# Patient Record
Sex: FEMALE | Race: WHITE | Employment: OTHER | URBAN - METROPOLITAN AREA
[De-identification: names, ages, dates, MRNs, and addresses within clinical notes are randomized per-mention and may not be internally consistent; named-entity substitution may affect disease eponyms.]

---

## 2017-02-16 ENCOUNTER — ANESTHESIA EVENT (OUTPATIENT)
Dept: SURGERY | Age: 77
End: 2017-02-16
Payer: MEDICARE

## 2017-02-17 ENCOUNTER — ANESTHESIA (OUTPATIENT)
Dept: SURGERY | Age: 77
End: 2017-02-17
Payer: MEDICARE

## 2017-02-17 ENCOUNTER — SURGERY (OUTPATIENT)
Age: 77
End: 2017-02-17

## 2017-02-17 PROCEDURE — 74011000250 HC RX REV CODE- 250

## 2017-02-17 PROCEDURE — 74011250636 HC RX REV CODE- 250/636

## 2017-02-17 RX ORDER — LIDOCAINE HYDROCHLORIDE 20 MG/ML
INJECTION, SOLUTION EPIDURAL; INFILTRATION; INTRACAUDAL; PERINEURAL AS NEEDED
Status: DISCONTINUED | OUTPATIENT
Start: 2017-02-17 | End: 2017-02-17 | Stop reason: HOSPADM

## 2017-02-17 RX ORDER — PROPOFOL 10 MG/ML
INJECTION, EMULSION INTRAVENOUS AS NEEDED
Status: DISCONTINUED | OUTPATIENT
Start: 2017-02-17 | End: 2017-02-17 | Stop reason: HOSPADM

## 2017-02-17 RX ADMIN — PROPOFOL 20 MG: 10 INJECTION, EMULSION INTRAVENOUS at 08:27

## 2017-02-17 RX ADMIN — PROPOFOL 20 MG: 10 INJECTION, EMULSION INTRAVENOUS at 08:29

## 2017-02-17 RX ADMIN — LIDOCAINE HYDROCHLORIDE 60 MG: 20 INJECTION, SOLUTION EPIDURAL; INFILTRATION; INTRACAUDAL; PERINEURAL at 08:24

## 2017-02-17 RX ADMIN — PROPOFOL 30 MG: 10 INJECTION, EMULSION INTRAVENOUS at 08:32

## 2017-02-17 RX ADMIN — PROPOFOL 70 MG: 10 INJECTION, EMULSION INTRAVENOUS at 08:24

## 2017-02-17 NOTE — ANESTHESIA PREPROCEDURE EVALUATION
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)     Other Findings   Comments:   IBS (irritable bowel syndrome           Physical Exam    Airway  Mallampati: II  TM Distance: 4 - 6 cm  Neck ROM: normal range of motion   Mouth opening: Normal     Cardiovascular  Regular rate and rhythm,  S1 and S2 normal,  no murmur, click, rub, or gallop  Rhythm: regular  Rate: normal         Dental  No notable dental hx       Pulmonary  Breath sounds clear to auscultation               Abdominal  GI exam deferred       Other Findings            Anesthetic Plan    ASA: 2  Anesthesia type: total IV anesthesia    Monitoring Plan: BIS      Induction: Intravenous  Anesthetic plan and risks discussed with: Patient

## 2017-02-17 NOTE — ANESTHESIA POSTPROCEDURE EVALUATION
Post-Anesthesia Evaluation and Assessment    Patient: Lacie Coley MRN: [id number]  SSN: [id number]    YOB: 1940  Age: 68 y.o. Sex: female       Cardiovascular Function/Vital Signs  Visit Vitals    /57    Pulse 79    Temp 36.5 °C (97.7 °F)    Resp 16    Ht 5' 7\" (1.702 m)    Wt 82.2 kg (181 lb 4 oz)    SpO2 100%    BMI 28.39 kg/m2       Patient is status post total IV anesthesia, MAC anesthesia for Procedure(s):  EGD  ESOPHAGOGASTRODUODENAL (EGD) BIOPSY  ESOPHAGEAL DILATION. Nausea/Vomiting: None    Postoperative hydration reviewed and adequate. Pain:  Pain Scale 1: Numeric (0 - 10) (02/17/17 0849)  Pain Intensity 1: 0 (02/17/17 0849)   Managed    Neurological Status:   Neuro (WDL): Exceptions to [facility] (02/17/17 9446)  Neuro  Neurologic State: Drowsy; Eyes open spontaneously (02/17/17 0849)  LUE Motor Response: Purposeful (02/17/17 0849)  LLE Motor Response: Purposeful (02/17/17 0849)  RUE Motor Response: Purposeful (02/17/17 0849)  RLE Motor Response: Purposeful (02/17/17 0849)   At baseline    Mental Status and Level of Consciousness: Arousable    Pulmonary Status:   O2 Device: Nasal cannula (02/17/17 0844)   Adequate oxygenation and airway patent    Complications related to anesthesia: None    Post-anesthesia assessment completed.  No concerns    Signed By: Arlet Perry MD     February 17, 2017

## 2017-02-17 NOTE — ANESTHESIA POSTPROCEDURE EVALUATION
Post-Anesthesia Evaluation and Assessment    Patient: Andre Sherman MRN: [id number]  SSN: [id number]    YOB: 1940  Age: 68 y.o. Sex: female       Cardiovascular Function/Vital Signs  Visit Vitals    /57    Pulse 71    Temp 36.5 °C (97.7 °F)    Resp 12    Ht 5' 7\" (1.702 m)    Wt 82.2 kg (181 lb 4 oz)    SpO2 100%    BMI 28.39 kg/m2       Patient is status post total IV anesthesia, MAC anesthesia for Procedure(s):  EGD  ESOPHAGOGASTRODUODENAL (EGD) BIOPSY  ESOPHAGEAL DILATION. Nausea/Vomiting: None    Postoperative hydration reviewed and adequate. Pain:  Pain Scale 1: Numeric (0 - 10) (02/17/17 0849)  Pain Intensity 1: 0 (02/17/17 0849)   Managed    Neurological Status:   Neuro (WDL): Exceptions to [facility] (02/17/17 1187)  Neuro  Neurologic State: Drowsy; Eyes open spontaneously (02/17/17 0849)  LUE Motor Response: Purposeful (02/17/17 0849)  LLE Motor Response: Purposeful (02/17/17 0849)  RUE Motor Response: Purposeful (02/17/17 0849)  RLE Motor Response: Purposeful (02/17/17 0849)   At baseline    Mental Status and Level of Consciousness: Arousable    Pulmonary Status:   O2 Device: Nasal cannula (02/17/17 0844)   Adequate oxygenation and airway patent    Complications related to anesthesia: None    Post-anesthesia assessment completed.  No concerns    Signed By: Nicholas Patton MD     February 17, 2017

## 2017-05-10 ENCOUNTER — HOSPITAL ENCOUNTER (OUTPATIENT)
Dept: ULTRASOUND IMAGING | Age: 77
Discharge: HOME | End: 2017-05-10
Attending: INTERNAL MEDICINE
Payer: MEDICARE

## 2017-05-10 DIAGNOSIS — N18.2 CHRONIC KIDNEY DISEASE, STAGE II (MILD): ICD-10-CM

## 2017-05-10 PROCEDURE — 76770 US EXAM ABDO BACK WALL COMP: CPT

## 2018-05-24 ENCOUNTER — HOSPITAL ENCOUNTER (OUTPATIENT)
Dept: GENERAL RADIOLOGY | Age: 78
Discharge: HOME | End: 2018-05-24
Attending: SPECIALIST
Payer: MEDICARE

## 2018-05-24 DIAGNOSIS — R07.0 PAIN IN THROAT: ICD-10-CM

## 2018-05-24 DIAGNOSIS — R13.10 SWALLOWING PAINFUL: ICD-10-CM

## 2018-05-24 PROCEDURE — 74220 X-RAY XM ESOPHAGUS 1CNTRST: CPT

## 2018-06-26 ENCOUNTER — HOSPITAL ENCOUNTER (OUTPATIENT)
Age: 78
Setting detail: OUTPATIENT SURGERY
Discharge: HOME | End: 2018-06-26
Attending: SPECIALIST | Admitting: SPECIALIST
Payer: MEDICARE

## 2018-06-26 VITALS
DIASTOLIC BLOOD PRESSURE: 81 MMHG | HEART RATE: 89 BPM | RESPIRATION RATE: 15 BRPM | BODY MASS INDEX: 28.41 KG/M2 | WEIGHT: 181 LBS | SYSTOLIC BLOOD PRESSURE: 141 MMHG | OXYGEN SATURATION: 99 % | HEIGHT: 67 IN

## 2018-06-26 PROCEDURE — 76040000019: Performed by: SPECIALIST

## 2018-06-26 PROCEDURE — 74011000250 HC RX REV CODE- 250: Performed by: SPECIALIST

## 2018-06-26 RX ORDER — MECLIZINE HYDROCHLORIDE 25 MG/1
25 TABLET ORAL
COMMUNITY

## 2018-06-26 RX ORDER — LIDOCAINE HYDROCHLORIDE 20 MG/ML
JELLY TOPICAL ONCE
Status: COMPLETED | OUTPATIENT
Start: 2018-06-26 | End: 2018-06-26

## 2018-06-26 RX ADMIN — LIDOCAINE HYDROCHLORIDE 5 ML: 20 JELLY TOPICAL at 10:02

## 2018-06-26 NOTE — DISCHARGE INSTRUCTIONS
Luis Felipe Jensen  [id number]  1940      MANOMETRY DISCHARGE INSTRUCTION    You may resume your regular diet as tolerated. You may resume your normal daily activities. If you develop a sore throat- throat lozenges or warm salt water gargles will help. Call your Physician if you have any complications or questions. Covestor Activation    Thank you for requesting access to Covestor. Please follow the instructions below to securely access and download your online medical record. Covestor allows you to send messages to your doctor, view your test results, renew your prescriptions, schedule appointments, and more. How Do I Sign Up? 1. In your internet browser, go to www.[employer]  2. Click on the First Time User? Click Here link in the Sign In box. You will be redirect to the New Member Sign Up page. 3. Enter your Covestor Access Code exactly as it appears below. You will not need to use this code after youve completed the sign-up process. If you do not sign up before the expiration date, you must request a new code. Covestor Access Code: [email or web address]  Expires: 2018  8:59 AM (This is the date your Covestor access code will )    4. Enter the last four digits of your Social Security Number (xxxx) and Date of Birth (mm/dd/yyyy) as indicated and click Submit. You will be taken to the next sign-up page. 5. Create a Covestor ID. This will be your Covestor login ID and cannot be changed, so think of one that is secure and easy to remember. 6. Create a Covestor password. You can change your password at any time. 7. Enter your Password Reset Question and Answer. This can be used at a later time if you forget your password. 8. Enter your e-mail address. You will receive e-mail notification when new information is available in [address] 9. Click Sign Up. You can now view and download portions of your medical record.   10. Click the Download Summary menu link to download a portable copy of your medical information. Additional Information    If you have questions, please visit the Frequently Asked Questions section of the HiConversion website at [email or web address] Remember, HiConversion is NOT to be used for urgent needs. For medical emergencies, dial 911.

## 2018-06-26 NOTE — IP AVS SNAPSHOT
[address] [facility] 
[phone] Patient: Emani Pacheco MRN: [id number] CY About your hospitalization You were admitted on:  2018 You last received care in the:  [facility] ENDOSCOPY You were discharged on:  2018 Why you were hospitalized Your primary diagnosis was:  Not on File Follow-up Information None Your Scheduled Appointments  ESOPHAGOGASTRODUODENOSCOPY (EGD) with Russell Huynh MD  
[facility] ENDOSCOPY (RI OR PRE ASSESSMENT) [address] [facility]  
[phone] Discharge Orders None A check manny indicates which time of day the medication should be taken. My Medications ASK your doctor about these medications Instructions Each Dose to Equal  
 Morning Noon Evening Bedtime  
 aluminum hydrox-magnesium carb  mg/15 mL suspension Commonly known as:  GAVISCON Your last dose was: Your next dose is: Take 15 mL by mouth as needed for Indigestion. 15 mL  
    
   
   
   
  
 aspirin 81 mg tablet Your last dose was: Your next dose is: Take 81 mg by mouth every morning. 81 mg CALTRATE-600 PLUS VITAMIN D3 PO Your last dose was: Your next dose is: Take 1 Tab by mouth every morning. 1 Tab  
    
   
   
   
  
 meclizine 12.5 mg tablet Commonly known as:  ANTIVERT Your last dose was: Your next dose is: Take 12.5 mg by mouth three (3) times daily as needed for Dizziness (vertigo). Indications: Vertigo 12.5 mg  
    
   
   
   
  
 multivitamin tablet Commonly known as:  ONE A DAY Your last dose was: Your next dose is: Take 1 Tab by mouth daily. 1 Tab PRESERVISION AREDS 2 PO Your last dose was: Your next dose is: Take 40 mg by mouth daily. Indications: eye lubrication 40 mg  
    
   
   
   
  
 PREVACID 15 mg capsule Generic drug:  lansoprazole Your last dose was: Your next dose is: Take 15 mg by mouth Daily (before breakfast). 15 mg PROBIOTIC 4X 10-15 mg Tbec Generic drug:  B.infantis-B.ani-B.long-B.bifi Your last dose was: Your next dose is: Take 1 Tab by mouth daily. 1 Tab PROzac 40 mg capsule Generic drug:  FLUoxetine Your last dose was: Your next dose is: Take 40 mg by mouth every morning. 40 mg Discharge Instructions Emani Pacheco 
[id number] 
1940 MANOMETRY DISCHARGE INSTRUCTION You may resume your regular diet as tolerated. You may resume your normal daily activities. If you develop a sore throat- throat lozenges or warm salt water gargles will help. Call your Physician if you have any complications or questions. [employer] Activation Thank you for requesting access to [employer]. Please follow the instructions below to securely access and download your online medical record. [employer] allows you to send messages to your doctor, view your test results, renew your prescriptions, schedule appointments, and more. How Do I Sign Up? 1. In your internet browser, go to www.[employer] 
2. Click on the First Time User? Click Here link in the Sign In box. You will be redirect to the New Member Sign Up page. 3. Enter your [employer] Access Code exactly as it appears below. You will not need to use this code after youve completed the sign-up process. If you do not sign up before the expiration date, you must request a new code. [employer] Access Code: [email or web address] Expires: 2018  8:59 AM (This is the date your [employer] access code will ) 4. Enter the last four digits of your Social Security Number (xxxx) and Date of Birth (mm/dd/yyyy) as indicated and click Submit. You will be taken to the next sign-up page. 5. Create a Ikonopedia ID. This will be your Ikonopedia login ID and cannot be changed, so think of one that is secure and easy to remember. 6. Create a Ikonopedia password. You can change your password at any time. 7. Enter your Password Reset Question and Answer. This can be used at a later time if you forget your password. 8. Enter your e-mail address. You will receive e-mail notification when new information is available in [address] 9. Click Sign Up. You can now view and download portions of your medical record. 10. Click the Download Summary menu link to download a portable copy of your medical information. Additional Information If you have questions, please visit the Frequently Asked Questions section of the Ikonopedia website at [email or web address] Remember, Ikonopedia is NOT to be used for urgent needs. For medical emergencies, dial 911. Introducing [facility]! [employer] introduces Ikonopedia patient portal. Now you can access parts of your medical record, email your doctor's office, and request medication refills online. 1. In your internet browser, go to [email or web address] 2. Click on the First Time User? Click Here link in the Sign In box. You will see the New Member Sign Up page. 3. Enter your Ikonopedia Access Code exactly as it appears below. You will not need to use this code after youve completed the sign-up process. If you do not sign up before the expiration date, you must request a new code. · Ikonopedia Access Code: [email or web address] Expires: 8/22/2018  8:59 AM 
 
4. Enter the last four digits of your Social Security Number (xxxx) and Date of Birth (mm/dd/yyyy) as indicated and click Submit. You will be taken to the next sign-up page. 5. Create a MaidSafe ID. This will be your MaidSafe login ID and cannot be changed, so think of one that is secure and easy to remember. 6. Create a MaidSafe password. You can change your password at any time. 7. Enter your Password Reset Question and Answer. This can be used at a later time if you forget your password. 8. Enter your e-mail address. You will receive e-mail notification when new information is available in [facility] 9. Click Sign Up. You can now view and download portions of your medical record. 10. Click the Download Summary menu link to download a portable copy of your medical information. If you have questions, please visit the Frequently Asked Questions section of the MaidSafe website. Remember, MaidSafe is NOT to be used for urgent needs. For medical emergencies, dial 911. Now available from your iPhone and Android! Introducing Stefan Hawley As a [facility] patient, I wanted to make you aware of our electronic visit tool called Stefan Colonmargarita. [facility] 24/7 allows you to connect within minutes with a medical provider 24 hours a day, seven days a week via a mobile device or tablet or logging into a secure website from your computer. You can access Stefan Marleen from anywhere in the [place]. A virtual visit might be right for you when you have a simple condition and feel like you just dont want to get out of bed, or cant get away from work for an appointment, when your regular [facility] provider is not available (evenings, weekends or holidays), or when youre out of town and need minor care. Electronic visits cost only $49 and if the [facility] 24/7 provider determines a prescription is needed to treat your condition, one can be electronically transmitted to a nearby pharmacy*. Please take a moment to enroll today if you have not already done so. The enrollment process is free and takes just a few minutes.   To enroll, please download the RetailTower 24/7 tomasa to your tablet or phone, or visit [email or web address] to enroll on your computer. And, as an [address] [place] [address] patient with a TRIBAX account, the results of your visits will be scanned into your electronic medical record and your primary care provider will be able to view the scanned results. We urge you to continue to see your regular [employer] provider for your ongoing medical care. And while your primary care provider may not be the one available when you seek a Splash.FM virtual visit, the peace of mind you get from getting a real diagnosis real time can be priceless. For more information on Splash.FM, view our Frequently Asked Questions (FAQs) at [email or web address]. Sincerely, 
 
Brian Weller MD 
Chief Medical Officer Itzel Cobb *:  certain medications cannot be prescribed via Splash.FM Providers Seen During Your Hospitalization Provider Specialty Primary office phone Johanna Melendrez MD Gastroenterology [phone] Your Primary Care Physician (PCP) Primary Care Physician Office Phone Office Fax Kylejuanita Eden [phone] You are allergic to the following Allergen Reactions Demerol (Meperidine) Nausea and Vomiting Codeine Not Reported This Time Penicillins Rash Itching Recent Documentation Height Weight Breastfeeding? BMI OB Status Smoking Status 1.702 m 82.1 kg No 28.35 kg/m2 Postmenopausal Never Smoker Emergency Contacts Name Discharge Info Relation Home Work Mobile Samra Deshpande DISCHARGE CAREGIVER [3] Daughter [21] [phone] Patient Belongings The following personal items are in your possession at time of discharge: 
  Dental Appliances: None  Visual Aid: Glasses Please provide this summary of care documentation to your next provider. Signatures-by signing, you are acknowledging that this After Visit Summary has been reviewed with you and you have received a copy. Patient Signature:  ____________________________________________________________ Date:  ____________________________________________________________  
  
[facility] Provider Signature:  ____________________________________________________________ Date:  ____________________________________________________________

## 2018-06-26 NOTE — IP AVS SNAPSHOT
[address] [facility] 
[phone] Patient: Negrita Hernandez MRN: [id number] CHN:[id number] A check manny indicates which time of day the medication should be taken. My Medications ASK your doctor about these medications Instructions Each Dose to Equal  
 Morning Noon Evening Bedtime  
 aluminum hydrox-magnesium carb  mg/15 mL suspension Commonly known as:  GAVISCON Your last dose was: Your next dose is: Take 15 mL by mouth as needed for Indigestion. 15 mL  
    
   
   
   
  
 aspirin 81 mg tablet Your last dose was: Your next dose is: Take 81 mg by mouth every morning. 81 mg CALTRATE-600 PLUS VITAMIN D3 PO Your last dose was: Your next dose is: Take 1 Tab by mouth every morning. 1 Tab  
    
   
   
   
  
 meclizine 12.5 mg tablet Commonly known as:  ANTIVERT Your last dose was: Your next dose is: Take 12.5 mg by mouth three (3) times daily as needed for Dizziness (vertigo). Indications: Vertigo 12.5 mg  
    
   
   
   
  
 multivitamin tablet Commonly known as:  ONE A DAY Your last dose was: Your next dose is: Take 1 Tab by mouth daily. 1 Tab PRESERVISION AREDS 2 PO Your last dose was: Your next dose is: Take 40 mg by mouth daily. Indications: eye lubrication 40 mg  
    
   
   
   
  
 PREVACID 15 mg capsule Generic drug:  lansoprazole Your last dose was: Your next dose is: Take 15 mg by mouth Daily (before breakfast). 15 mg PROBIOTIC 4X 10-15 mg Tbec Generic drug:  B.infantis-B.ani-B.long-B.bifi Your last dose was: Your next dose is: Take 1 Tab by mouth daily. 1 Tab PROzac 40 mg capsule Generic drug:  FLUoxetine Your last dose was: Your next dose is: Take 40 mg by mouth every morning.   
 40 mg

## 2018-07-02 NOTE — OP NOTES
[address]  OPERATIVE REPORT    Name:Rosalia DIGGS Zion  MR#: [id number]  : 1940  ACCOUNT #: [de-identified]   DATE OF SERVICE: 2018    PREOPERATIVE DIAGNOSES:    1. Throat pain. 2.  Odynophagia. 3.  History of esophageal motor disorder. POSTPROCEDURE DIAGNOSES:    1. Consistent with diffuse esophageal spasm. 2.  All impedance boluses failed to clear completely. PROCEDURES PERFORMED:    1. Esophageal manometry. 2.  Impedance esophageal manometry. SURGEON:  Peng DALE        ASSISTANT:  Jeana MELENDEZ      SPECIMENS REMOVED:  None. ANESTHESIA:  Topical.      ESTIMATED BLOOD LOSS:  None. COMPLICATIONS:  None. IMPLANTS:  None. DESCRIPTION OF PROCEDURE:  High resolution esophageal manometry was performed by the nursing staff with subsequent interpretation by Dr. Orlando Felix. The lower esophageal sphincter is at 43.3 cm and for a distance of 4.4 cm distally. Lower esophageal sphincter pressures are normal:  Respiratory minimum 17.9, respiratory mean 25.3, residual after swallowing 8.3. The upper esophageal sphincter pressure is not reported here. This is not a reliable test for measurement of or interpretation of clinical upper esophageal sphincter disorders. Wet swallows are then administered. By standard criteria, 9 are simultaneous and 1 is peristaltic. Mean amplitude 95.3 mmHg, mean duration 3.6 seconds. The velocity is high at 8.9 cm per second. High resolution scoring is as follows:  DCI normal 1337.8. Contractile front velocity normal 5.7. Intrabolus pressure at LES 1.1, which is normal.  Intrabolus pressure average maximum body of the esophagus at 11.1, which is normal.      Merrifield scoring is as follows:  Distal latency 4.5, percent failed 10, percent pan esophageal pressurization 10, percent rapid 10, percent premature 50, percent large breaks 0, percent small breaks 0.       The premature contractions (distal latency under 4.5 seconds) makes the diagnosis of distal spasm in the absence of esophagogastric junction outflow obstruction. This could progress to achalasia, but it is not achalasia. Impedance manometry shows that 100% of impedance boluses failed to clear completely.       MD JESSY Hubbard /   D: 07/01/2018 20:36     T: 07/02/2018 08:26  JOB #: [id number]  CC: Cuba Fortune MD  CC: Andre Wesley MD

## 2018-07-26 ENCOUNTER — HOSPITAL ENCOUNTER (OUTPATIENT)
Age: 78
Setting detail: OUTPATIENT SURGERY
Discharge: HOME | End: 2018-07-26
Attending: SPECIALIST | Admitting: SPECIALIST
Payer: MEDICARE

## 2018-07-26 ENCOUNTER — ANESTHESIA (OUTPATIENT)
Dept: ENDOSCOPY | Age: 78
End: 2018-07-26
Payer: MEDICARE

## 2018-07-26 ENCOUNTER — ANESTHESIA EVENT (OUTPATIENT)
Dept: ENDOSCOPY | Age: 78
End: 2018-07-26
Payer: MEDICARE

## 2018-07-26 VITALS
RESPIRATION RATE: 14 BRPM | DIASTOLIC BLOOD PRESSURE: 79 MMHG | HEIGHT: 67 IN | TEMPERATURE: 98.2 F | OXYGEN SATURATION: 99 % | HEART RATE: 87 BPM | WEIGHT: 180 LBS | BODY MASS INDEX: 28.25 KG/M2 | SYSTOLIC BLOOD PRESSURE: 134 MMHG

## 2018-07-26 PROCEDURE — 76060000031 HC ANESTHESIA FIRST 0.5 HR: Performed by: SPECIALIST

## 2018-07-26 PROCEDURE — 74011250636 HC RX REV CODE- 250/636: Performed by: SPECIALIST

## 2018-07-26 PROCEDURE — 77030018712 HC DEV BLLN INFL BSC -B: Performed by: SPECIALIST

## 2018-07-26 PROCEDURE — C1726 CATH, BAL DIL, NON-VASCULAR: HCPCS | Performed by: SPECIALIST

## 2018-07-26 PROCEDURE — 74011250636 HC RX REV CODE- 250/636

## 2018-07-26 PROCEDURE — 76040000019: Performed by: SPECIALIST

## 2018-07-26 PROCEDURE — 74011000250 HC RX REV CODE- 250

## 2018-07-26 RX ORDER — NALOXONE HYDROCHLORIDE 0.4 MG/ML
0.4 INJECTION, SOLUTION INTRAMUSCULAR; INTRAVENOUS; SUBCUTANEOUS
Status: DISCONTINUED | OUTPATIENT
Start: 2018-07-26 | End: 2018-07-26 | Stop reason: HOSPADM

## 2018-07-26 RX ORDER — GLYCOPYRROLATE 0.2 MG/ML
INJECTION INTRAMUSCULAR; INTRAVENOUS AS NEEDED
Status: DISCONTINUED | OUTPATIENT
Start: 2018-07-26 | End: 2018-07-26 | Stop reason: HOSPADM

## 2018-07-26 RX ORDER — EPINEPHRINE 0.1 MG/ML
1 INJECTION INTRACARDIAC; INTRAVENOUS
Status: DISCONTINUED | OUTPATIENT
Start: 2018-07-26 | End: 2018-07-26 | Stop reason: HOSPADM

## 2018-07-26 RX ORDER — DEXTROMETHORPHAN/PSEUDOEPHED 2.5-7.5/.8
1.2 DROPS ORAL
Status: DISCONTINUED | OUTPATIENT
Start: 2018-07-26 | End: 2018-07-26 | Stop reason: HOSPADM

## 2018-07-26 RX ORDER — SODIUM CHLORIDE 0.9 % (FLUSH) 0.9 %
5-10 SYRINGE (ML) INJECTION EVERY 8 HOURS
Status: DISCONTINUED | OUTPATIENT
Start: 2018-07-26 | End: 2018-07-26 | Stop reason: HOSPADM

## 2018-07-26 RX ORDER — ATROPINE SULFATE 0.1 MG/ML
0.5 INJECTION INTRAVENOUS
Status: DISCONTINUED | OUTPATIENT
Start: 2018-07-26 | End: 2018-07-26 | Stop reason: HOSPADM

## 2018-07-26 RX ORDER — PROPOFOL 10 MG/ML
INJECTION, EMULSION INTRAVENOUS AS NEEDED
Status: DISCONTINUED | OUTPATIENT
Start: 2018-07-26 | End: 2018-07-26 | Stop reason: HOSPADM

## 2018-07-26 RX ORDER — ALPRAZOLAM 0.5 MG/1
0.5 TABLET ORAL 2 TIMES DAILY
COMMUNITY

## 2018-07-26 RX ORDER — FLUMAZENIL 0.1 MG/ML
0.2 INJECTION INTRAVENOUS
Status: DISCONTINUED | OUTPATIENT
Start: 2018-07-26 | End: 2018-07-26 | Stop reason: HOSPADM

## 2018-07-26 RX ORDER — SODIUM CHLORIDE 9 MG/ML
100 INJECTION, SOLUTION INTRAVENOUS CONTINUOUS
Status: DISCONTINUED | OUTPATIENT
Start: 2018-07-26 | End: 2018-07-26 | Stop reason: HOSPADM

## 2018-07-26 RX ORDER — LIDOCAINE HYDROCHLORIDE 20 MG/ML
INJECTION, SOLUTION EPIDURAL; INFILTRATION; INTRACAUDAL; PERINEURAL AS NEEDED
Status: DISCONTINUED | OUTPATIENT
Start: 2018-07-26 | End: 2018-07-26 | Stop reason: HOSPADM

## 2018-07-26 RX ORDER — DILTIAZEM HYDROCHLORIDE 30 MG/1
30 TABLET, FILM COATED ORAL
COMMUNITY
End: 2019-04-28

## 2018-07-26 RX ORDER — MIDAZOLAM HYDROCHLORIDE 1 MG/ML
.25-5 INJECTION, SOLUTION INTRAMUSCULAR; INTRAVENOUS
Status: DISCONTINUED | OUTPATIENT
Start: 2018-07-26 | End: 2018-07-26 | Stop reason: HOSPADM

## 2018-07-26 RX ORDER — SODIUM CHLORIDE 0.9 % (FLUSH) 0.9 %
5-10 SYRINGE (ML) INJECTION AS NEEDED
Status: DISCONTINUED | OUTPATIENT
Start: 2018-07-26 | End: 2018-07-26 | Stop reason: HOSPADM

## 2018-07-26 RX ADMIN — PROPOFOL 220 MG: 10 INJECTION, EMULSION INTRAVENOUS at 11:16

## 2018-07-26 RX ADMIN — SODIUM CHLORIDE 100 ML/HR: 900 INJECTION, SOLUTION INTRAVENOUS at 10:55

## 2018-07-26 RX ADMIN — LIDOCAINE HYDROCHLORIDE 100 MG: 20 INJECTION, SOLUTION EPIDURAL; INFILTRATION; INTRACAUDAL; PERINEURAL at 11:01

## 2018-07-26 RX ADMIN — GLYCOPYRROLATE 0.2 MG: 0.2 INJECTION INTRAMUSCULAR; INTRAVENOUS at 11:01

## 2018-07-26 NOTE — PERIOP NOTES
CRE balloon dilatation of the esophagus   15 mm Balloon inflated to 3 ATMs and held for 60 seconds. 18 mm Balloon inflated to 7 ATMs and held for 60 seconds. No subcutaneous crepitus of the chest or cervical region was noted post dilatation.

## 2018-07-26 NOTE — IP AVS SNAPSHOT
[address] [facility] 
[phone] Patient: Briseyda Chen MRN: [id number] SZV:9/96/6121 About your hospitalization You were admitted on:  July 26, 2018 You last received care in the:  [facility] ENDOSCOPY You were discharged on:  July 26, 2018 Why you were hospitalized Your primary diagnosis was:  Not on File Your diagnoses also included:  Dysphagia, Epigastric Abdominal Pain, Esophageal Motor Disorder Follow-up Information Follow up With Details Comments Contact Info Jenny Nav, [address] [facility] [address] [address] [phone] Discharge Orders None A check manny indicates which time of day the medication should be taken. My Medications CONTINUE taking these medications Instructions Each Dose to Equal  
 Morning Noon Evening Bedtime  
 aluminum hydrox-magnesium carb  mg/15 mL suspension Commonly known as:  GAVISCON Your last dose was: Your next dose is: Take 15 mL by mouth as needed for Indigestion. 15 mL  
    
   
   
   
  
 aspirin 81 mg tablet Your last dose was: Your next dose is: Take 81 mg by mouth every morning. 81 mg CALTRATE-600 PLUS VITAMIN D3 PO Your last dose was: Your next dose is: Take 1 Tab by mouth every morning. 1 Tab  
    
   
   
   
  
 dilTIAZem 30 mg tablet Commonly known as:  CARDIZEM Your last dose was: Your next dose is: Take 30 mg by mouth. 30 mg  
    
   
   
   
  
 ICAPS AREDS PO Your last dose was: Your next dose is: Take  by mouth.  
     
   
   
   
  
 meclizine 12.5 mg tablet Commonly known as:  ANTIVERT Your last dose was: Your next dose is:     
   
   
 Take 12.5 mg by mouth three (3) times daily as needed for Dizziness (vertigo). Indications: Vertigo 12.5 mg  
    
   
   
   
  
 multivitamin tablet Commonly known as:  ONE A DAY Your last dose was: Your next dose is: Take 1 Tab by mouth daily. 1 Tab PRESERVISION AREDS 2 PO Your last dose was: Your next dose is: Take 40 mg by mouth daily. Indications: eye lubrication 40 mg  
    
   
   
   
  
 PREVACID 15 mg capsule Generic drug:  lansoprazole Your last dose was: Your next dose is: Take 15 mg by mouth Daily (before breakfast). 15 mg PROBIOTIC 4X 10-15 mg Tbec Generic drug:  B.infantis-B.ani-B.long-B.bifi Your last dose was: Your next dose is: Take 1 Tab by mouth daily. 1 Tab PROzac 40 mg capsule Generic drug:  FLUoxetine Your last dose was: Your next dose is: Take 40 mg by mouth every morning. 40 mg  
    
   
   
   
  
 XANAX 0.5 mg tablet Generic drug:  ALPRAZolam  
   
Your last dose was: Your next dose is: Take  by mouth. Discharge Instructions Randell Reyes 
[id number] 
1940 Procedure  Discharge Instructions:   
 
Discomfort: 
Redness at IV site- apply warm compress to area; if redness or soreness persist- contact your physician There may be a slight amount of blood passed from the rectum Gaseous discomfort- walking, belching will help relieve any discomfort You may not operate a vehicle for 12 hours You may not engage in an occupation involving machinery or appliances for rest of today You may not drink alcoholic beverages for at least 12 hours Avoid making any critical decisions for at least 24 hour DIET: 
 You may resume your normal diet today. You should not overeat or \"feast\" today as your abdomen may become distended or uncomfortable.    
 
MEDICATIONS: 
 I reconciled this list from the list you gave us when you came today for the procedure. Please clarify with me, your primary care physician and the nurse who is discharging you if we have any discrepancies. Aspirin and or non-steroidal medication (Ibuprofen, Motrin, naproxen, etc.) is ok in limited quantities. ACTIVITY: 
You may resume your normal daily activities it is recommended that you spend the remainder of the day resting -  avoid any strenuous activity. CALL M.D. ANY SIGN OF: Increasing pain, nausea, vomiting Abdominal distension (swelling) New increased bleeding (oral or rectal) Fever (chills) Pain in chest area Bloody discharge from nose or mouth Shortness of breath Follow-up Instructions: No biopsies Telephone #  [phone] Follow up visit as previously scheduled. Portal message or call for issues. I can consider verapamil, which is similar to diltiazem. Una Mcdaniels MD 
11:17 AM 
7/26/2018 Introducing \[facility]\"" & HEALTH SERVICES! Dear Nelly Edward: Thank you for requesting a [employer] account. Our records indicate that you already have an active [employer] account. You can access your account anytime at [email or web address] Did you know that you can access your hospital and ER discharge instructions at any time in Nova[employer]om? You can also review all of your test results from your hospital stay or ER visit. Additional Information If you have questions, please visit the Frequently Asked Questions section of the [employer]om website at [email or web address] Remember, NovaSom is NOT to be used for urgent needs. For medical emergencies, dial 911. Now available from your iPhone and Android! Introducing Stefan Hawley As a [employer] patient, I wanted to make you aware of our electronic visit tool called Stefan Hawley. [employer] 24/7 allows you to connect within minutes with a medical provider 24 hours a day, seven days a week via a mobile device or tablet or logging into a secure website from your computer. You can access [employer] from anywhere in the [place]. A virtual visit might be right for you when you have a simple condition and feel like you just dont want to get out of bed, or cant get away from work for an appointment, when your regular [employer] provider is not available (evenings, weekends or holidays), or when youre out of town and need minor care. Electronic visits cost only $49 and if the [employer] 24/7 provider determines a prescription is needed to treat your condition, one can be electronically transmitted to a nearby pharmacy*. Please take a moment to enroll today if you have not already done so. The enrollment process is free and takes just a few minutes. To enroll, please download the [employer] 24/7 tomasa to your tablet or phone, or visit [email or web address] to enroll on your computer. And, as an [address] [place] [address] patient with a [employer] account, the results of your visits will be scanned into your electronic medical record and your primary care provider will be able to view the scanned results. We urge you to continue to see your regular [employer] provider for your ongoing medical care. And while your primary care provider may not be the one available when you seek a Stefan [employer] virtual visit, the peace of mind you get from getting a real diagnosis real time can be priceless. For more information on [employer], view our Frequently Asked Questions (FAQs) at [email or web address]. Sincerely, 
 
Charleen Magana MD 
Chief Medical Officer [employer] *:  certain medications cannot be prescribed via [employer] Providers Seen During Your Hospitalization Provider Specialty Primary office phone iRvera Rowe MD Gastroenterology [phone] Your Primary Care Physician (PCP) Primary Care Physician Office Phone Office Fax Melisa Gonzalez [phone] You are allergic to the following Allergen Reactions Demerol (Meperidine) Nausea and Vomiting Codeine Not Reported This Time Penicillins Rash Itching Recent Documentation Height Weight BMI OB Status Smoking Status 1.702 m 81.6 kg 28.19 kg/m2 Postmenopausal Never Smoker Emergency Contacts Name Discharge Info Relation Home Work Mobile Samra Deshpande DISCHARGE CAREGIVER [3] Daughter [21] [phone] Patient Belongings The following personal items are in your possession at time of discharge: 
  Dental Appliances: None  Visual Aid: Glasses Please provide this summary of care documentation to your next provider. Signatures-by signing, you are acknowledging that this After Visit Summary has been reviewed with you and you have received a copy. Patient Signature:  ____________________________________________________________ Date:  ____________________________________________________________  
  
Kristine Goelmp Provider Signature:  ____________________________________________________________ Date:  ____________________________________________________________

## 2018-07-26 NOTE — PERIOP NOTES
Anesthesia reports 220mg Propofol, 100mg Lidocaine and 500mL NS, 0.2 mg robinul given during procedure. Received report from anesthesia staff on vital signs and status of patient.

## 2018-07-26 NOTE — IP AVS SNAPSHOT
[address] [place] [address] [address] [place] [address] 
[phone] Patient: Regino Andujar MRN: [id number] [facility]:9/71/8552 A check manny indicates which time of day the medication should be taken. My Medications CONTINUE taking these medications Instructions Each Dose to Equal  
 Morning Noon Evening Bedtime  
 aluminum hydrox-magnesium carb  mg/15 mL suspension Commonly known as:  GAVISCON Your last dose was: Your next dose is: Take 15 mL by mouth as needed for Indigestion. 15 mL  
    
   
   
   
  
 aspirin 81 mg tablet Your last dose was: Your next dose is: Take 81 mg by mouth every morning. 81 mg CALTRATE-600 PLUS VITAMIN D3 PO Your last dose was: Your next dose is: Take 1 Tab by mouth every morning. 1 Tab  
    
   
   
   
  
 dilTIAZem 30 mg tablet Commonly known as:  CARDIZEM Your last dose was: Your next dose is: Take 30 mg by mouth. 30 mg  
    
   
   
   
  
 ICAPS AREDS PO Your last dose was: Your next dose is: Take  by mouth.  
     
   
   
   
  
 meclizine 12.5 mg tablet Commonly known as:  ANTIVERT Your last dose was: Your next dose is: Take 12.5 mg by mouth three (3) times daily as needed for Dizziness (vertigo). Indications: Vertigo 12.5 mg  
    
   
   
   
  
 multivitamin tablet Commonly known as:  ONE A DAY Your last dose was: Your next dose is: Take 1 Tab by mouth daily. 1 Tab PRESERVISION AREDS 2 PO Your last dose was: Your next dose is: Take 40 mg by mouth daily. Indications: eye lubrication 40 mg  
    
   
   
   
  
 PREVACID 15 mg capsule Generic drug:  lansoprazole Your last dose was: Your next dose is: Take 15 mg by mouth Daily (before breakfast). 15 mg PROBIOTIC 4X 10-15 mg Tbec Generic drug:  B.infantis-B.ani-B.long-B.bifi Your last dose was: Your next dose is: Take 1 Tab by mouth daily. 1 Tab PROzac 40 mg capsule Generic drug:  FLUoxetine Your last dose was: Your next dose is: Take 40 mg by mouth every morning. 40 mg  
    
   
   
   
  
 XANAX 0.5 mg tablet Generic drug:  ALPRAZolam  
   
Your last dose was: Your next dose is: Take  by mouth.

## 2018-07-26 NOTE — DISCHARGE INSTRUCTIONS
Randell Reyes  [id number]  1940              Procedure  Discharge Instructions:      Discomfort:  Redness at IV site- apply warm compress to area; if redness or soreness persist- contact your physician  There may be a slight amount of blood passed from the rectum  Gaseous discomfort- walking, belching will help relieve any discomfort  You may not operate a vehicle for 12 hours  You may not engage in an occupation involving machinery or appliances for rest of today  You may not drink alcoholic beverages for at least 12 hours  Avoid making any critical decisions for at least 24 hour  DIET:   You may resume your normal diet today. You should not overeat or \"feast\" today as your abdomen may become distended or uncomfortable. MEDICATIONS:   I reconciled this list from the list you gave us when you came today for the procedure. Please clarify with me, your primary care physician and the nurse who is discharging you if we have any discrepancies. Aspirin and or non-steroidal medication (Ibuprofen, Motrin, naproxen, etc.) is ok in limited quantities. ACTIVITY:  You may resume your normal daily activities it is recommended that you spend the remainder of the day resting -  avoid any strenuous activity. CALL M.D. ANY SIGN OF:  Increasing pain, nausea, vomiting  Abdominal distension (swelling)  New increased bleeding (oral or rectal)  Fever (chills)  Pain in chest area  Bloody discharge from nose or mouth  Shortness of breath          Follow-up Instructions:   No biopsies  Telephone #  [phone]  Follow up visit as previously scheduled. Portal message or call for issues. I can consider verapamil, which is similar to diltiazem.       Cynthia Carias MD  11:17 AM  7/26/2018

## 2018-07-26 NOTE — H&P
Pre-endoscopy H and P    The patient was seen and examined in the endoscopy suite. The airway was assessed and docuemented. The problem list, past medical history, and medications were reviewed. The history is:                  [address] [facility], [address] [place], [place] [address]Kori Osei  [phone]     Date: 2018 10:45 AM   Patient Name: Ridge Jorgee   Account #: [id number]    Gender: Female    (age): 1940 (68)       Provider:     Naresh aDi MD        Referring Physician:     Leno Hicks. Collette Han, [address], [place], [address] [facility]  [phone] (phone)  [phone] (fax)        Chief Complaint: 9 months since last visit. new symptoms  throat pain           History of Present Illness:   \"I am a mess\" she says. In March she got coughing and throat cleariong. Told pollen allergy. couging, throat clearing, given prednsione. two types of antihistamines. home remidies.   (garling and humidifier). she is still clearing throat constantly. She will get better from time to time    Towards nightthime she cannot get to sleep. she will be sitting up coughing, hacking and \"carrying on\". Better if face next to humidifier. when she gets up she can start again    eating ok   she can feel food and especially pilss cross into the esophagus      currently no ppi Rx.  took Prilosec otc.  not much help. (she likes prevacid better)    boewls ok  not on trulance right now. for her dysphagia I did an esophageal work up in the past.      manometry:  POSTOPERATIVE DIAGNOSIS: Abnormal esophageal manometry consistent with  diffuse esophageal spasm or possible achalasia. 2012  she had response to dilation          repeat manometry:  POSTPROCEDURE DIAGNOSES:    1. Consistent with diffuse esophageal spasm.     2.  All impedance boluses failed to clear completely      Patient Active Problem List   Diagnosis Code    Family history of colon cancer Z80.0    Family history of colonic polyps Z83.71    Esophageal motor disorder K22.4    Epigastric abdominal pain R10.13    Dizziness R42    Anxiety state, unspecified F41.1    Vertigo R42    Dysphagia R13.10    GERD (gastroesophageal reflux disease) K21.9     Social History     Social History    Marital status:      Spouse name: N/A    Number of children: N/A    Years of education: N/A     Occupational History    Not on file. 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 pt has never head of malignant hyperthermia and pt has never had difficulty during anesthesia     The patient has a family history of na    Prior to Admission Medications   Prescriptions Last Dose Informant Patient Reported? Taking? ALPRAZolam (XANAX) 0.5 mg tablet 7/25/2018 at Unknown time  Yes Yes   Sig: Take  by mouth. B.infantis-B.ani-B.long-B.bifi (PROBIOTIC 4X) 10-15 mg Tab Not Taking at Unknown time  Yes No   Sig: Take 1 Tab by mouth daily. CALCIUM CARBONATE/VITAMIN D3 (CALTRATE-600 PLUS VITAMIN D3 PO) 7/25/2018 at Unknown time  Yes Yes   Sig: Take 1 Tab by mouth every morning. aluminum hydrox-magnesium carb (GAVISCON)  mg/15 mL suspension Unknown at Unknown time  Yes No   Sig: Take 15 mL by mouth as needed for Indigestion.    aspirin 81 mg tablet 7/25/2018 at Unknown time  Yes Yes   Sig: Take 81 mg by mouth every morning. dilTIAZem (CARDIZEM) 30 mg tablet 7/25/2018 at Unknown time  Yes Yes   Sig: Take 30 mg by mouth. fluoxetine (PROZAC) 40 mg capsule 7/25/2018 at Unknown time  Yes Yes   Sig: Take 40 mg by mouth every morning. lansoprazole (PREVACID) 15 mg capsule 7/25/2018 at Unknown time  Yes Yes   Sig: Take 15 mg by mouth Daily (before breakfast). meclizine (ANTIVERT) 12.5 mg tablet   Yes No   Sig: Take 12.5 mg by mouth three (3) times daily as needed for Dizziness (vertigo). Indications: Vertigo   multivitamin (ONE A DAY) tablet 7/25/2018 at Unknown time  Yes Yes   Sig: Take 1 Tab by mouth daily. vit A/vit C/vit E/zinc/copper (ICAPS AREDS PO)   Yes Yes   Sig: Take  by mouth. vit C/E/Zn/coppr/lutein/zeaxan (PRESERVISION AREDS 2 PO) 6/26/2018 at Unknown time  Yes Yes   Sig: Take 40 mg by mouth daily. Indications: eye lubrication      Facility-Administered Medications: None         Bas abnormal  The review of systems is:  negative for shortness of breath or chest pain      The heart, lungs, and mental status were satisfactory for the administration of anesthesia sedation and for the procedure. I discussed with the patient the objectives, risks, consequences and alternatives to the procedure.       Barbi Douglas MD  7/26/2018  10:50 AM

## 2018-07-26 NOTE — ANESTHESIA PREPROCEDURE EVALUATION
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)     Other Findings   Comments:   IBS (irritable bowel syndrome)  Rheumatoid arthritis           Physical Exam    Airway  Mallampati: II  TM Distance: 4 - 6 cm  Neck ROM: normal range of motion   Mouth opening: Normal     Cardiovascular  Regular rate and rhythm,  S1 and S2 normal,  no murmur, click, rub, or gallop  Rhythm: regular  Rate: normal         Dental    Dentition: Upper dentition intact and Lower dentition intact     Pulmonary  Breath sounds clear to auscultation               Abdominal  GI exam deferred       Other Findings            Anesthetic Plan    ASA: 2  Anesthesia type: total IV anesthesia and general    Monitoring Plan: BIS      Induction: Intravenous  Anesthetic plan and risks discussed with: Patient

## 2018-07-26 NOTE — ANESTHESIA POSTPROCEDURE EVALUATION
Post-Anesthesia Evaluation and Assessment    Patient: Richmond Rinne MRN: [id number]  SSN: [id number]    YOB: 1940  Age: 66 y.o. Sex: female       Cardiovascular Function/Vital Signs  Visit Vitals    /79    Pulse 87    Temp 36.8 °C (98.2 °F)    Resp 14    Ht 5' 7\" (1.702 m)    Wt 81.6 kg (180 lb)    SpO2 99%    BMI 28.19 kg/m2       Patient is status post total IV anesthesia anesthesia for Procedure(s):  ESOPHAGOGASTRODUODENOSCOPY (EGD)  ESOPHAGEAL DILATION. Nausea/Vomiting: None    Postoperative hydration reviewed and adequate. Pain:  Pain Scale 1: Numeric (0 - 10) (07/26/18 1132)  Pain Intensity 1: 0 (07/26/18 1132)   Managed    Neurological Status: At baseline    Mental Status and Level of Consciousness: Arousable    Pulmonary Status:   O2 Device: Room air (07/26/18 1132)   Adequate oxygenation and airway patent    Complications related to anesthesia: None    Post-anesthesia assessment completed.  No concerns    Signed By: Keke Soto DO     July 26, 2018

## 2018-07-26 NOTE — PROGRESS NOTES
Andre Friend  1940  [id number]    Situation:  Verbal report received from: Michele Trotter RN  Procedure: Procedure(s):  ESOPHAGOGASTRODUODENOSCOPY (EGD)  ESOPHAGEAL DILATION    Background:    Preoperative diagnosis: DYSPHAGIA, OTHER, GERD  Postoperative diagnosis: hiatal hernia, schotzki's ring    :  Dr. Dulce Villalobos  Assistant(s): Endoscopy Technician-1: Nini Mullen  Endoscopy RN-1: Justin Kirkland RN    Specimens: * No specimens in log *  H. Pylori  no    Assessment:  Intra-procedure medications   Anesthesia gave intra-procedure sedation and medications, see anesthesia flow sheet yes    Intravenous fluids: NS@ KVO     Vital signs stable      Abdominal assessment: round and soft      Recommendation:  Discharge patient per MD order .   Family or Friend    Permission to share finding with family or friend yes

## 2018-07-26 NOTE — PROCEDURES
Esophagogastroduodenoscopy    Indications:  Cough  Throat pain  Motor disorder of the esophagus    Medications:  See anesthesia form    Post procedure diagnosis:  hiatal hernia, schotzki's ring    Description of Procedure:    Prior to the procedure its objectives, risks, consequences and alternatives were discussed with the patient who then elected to proceed. The Olympus video endoscope was inserted under direct vision into the mouth and then into the esophagus. The esophagus mucosa looked normal.    About two cm proximal to the z line there was a non obstructing ring. The z-line was located at 39 cm. It was irregular; prior biopsies did not show Moncada's. A 3 cm hiatal hernia was present with the diaphragm pinch at 42 There were no diagnostic abnormalities of the body, fundus, antrum, cardia and incisura of the stomach. This included direct and retroflexion examination. The first and second portion of the duodenum appeared normal.  I then dilated the distal esophagus with a through the scope balloon. I dilated from 15mm  to 18mm mm. Each time the balloon was inflated for sixty seconds. There were no apparent complications. Complications: There were no apparent complications and the patient tolerated the procedure well.         Estimated Blood Loss:  none  Specimens Removed:  none  Impressions:  Successful dilation to 18mm  Distal esophageal ring (acquired)  Hiatal hernia        Signed By: John Wright MD                        July 26, 2018     11:15 AM

## 2019-04-28 ENCOUNTER — APPOINTMENT (OUTPATIENT)
Dept: CT IMAGING | Age: 79
DRG: 335 | End: 2019-04-28
Attending: EMERGENCY MEDICINE
Payer: MEDICARE

## 2019-04-28 ENCOUNTER — HOSPITAL ENCOUNTER (INPATIENT)
Age: 79
LOS: 12 days | Discharge: NURSING, REHAB OR LONG-TERM CARE | DRG: 335 | End: 2019-05-10
Attending: EMERGENCY MEDICINE | Admitting: SURGERY
Payer: MEDICARE

## 2019-04-28 DIAGNOSIS — G93.40 ENCEPHALOPATHY: ICD-10-CM

## 2019-04-28 DIAGNOSIS — K56.609 SMALL BOWEL OBSTRUCTION (HCC): Primary | ICD-10-CM

## 2019-04-28 LAB
ALBUMIN SERPL-MCNC: 3.9 G/DL (ref 3.5–5)
ALBUMIN/GLOB SERPL: 1.1 {RATIO} (ref 1.1–2.2)
ALP SERPL-CCNC: 79 U/L (ref 45–117)
ALT SERPL-CCNC: 28 U/L (ref 12–78)
ANION GAP SERPL CALC-SCNC: 5 MMOL/L (ref 5–15)
APPEARANCE UR: CLEAR
AST SERPL-CCNC: 25 U/L (ref 15–37)
BACTERIA URNS QL MICRO: NEGATIVE /HPF
BASOPHILS # BLD: 0 K/UL (ref 0–0.1)
BASOPHILS NFR BLD: 0 % (ref 0–1)
BILIRUB SERPL-MCNC: 0.5 MG/DL (ref 0.2–1)
BILIRUB UR QL: NEGATIVE
BUN SERPL-MCNC: 12 MG/DL (ref 6–20)
BUN/CREAT SERPL: 13 (ref 12–20)
CALCIUM SERPL-MCNC: 8.5 MG/DL (ref 8.5–10.1)
CHLORIDE SERPL-SCNC: 104 MMOL/L (ref 97–108)
CO2 SERPL-SCNC: 29 MMOL/L (ref 21–32)
COLOR UR: NORMAL
CREAT SERPL-MCNC: 0.96 MG/DL (ref 0.55–1.02)
DIFFERENTIAL METHOD BLD: ABNORMAL
EOSINOPHIL # BLD: 0.1 K/UL (ref 0–0.4)
EOSINOPHIL NFR BLD: 1 % (ref 0–7)
EPITH CASTS URNS QL MICRO: NORMAL /LPF
ERYTHROCYTE [DISTWIDTH] IN BLOOD BY AUTOMATED COUNT: 13.2 % (ref 11.5–14.5)
GLOBULIN SER CALC-MCNC: 3.7 G/DL (ref 2–4)
GLUCOSE SERPL-MCNC: 108 MG/DL (ref 65–100)
GLUCOSE UR STRIP.AUTO-MCNC: NEGATIVE MG/DL
HCT VFR BLD AUTO: 42.9 % (ref 35–47)
HGB BLD-MCNC: 14.1 G/DL (ref 11.5–16)
HGB UR QL STRIP: NEGATIVE
HYALINE CASTS URNS QL MICRO: NORMAL /LPF (ref 0–5)
IMM GRANULOCYTES # BLD AUTO: 0 K/UL (ref 0–0.04)
IMM GRANULOCYTES NFR BLD AUTO: 0 % (ref 0–0.5)
KETONES UR QL STRIP.AUTO: NEGATIVE MG/DL
LEUKOCYTE ESTERASE UR QL STRIP.AUTO: NEGATIVE
LIPASE SERPL-CCNC: 101 U/L (ref 73–393)
LYMPHOCYTES # BLD: 1.6 K/UL (ref 0.8–3.5)
LYMPHOCYTES NFR BLD: 19 % (ref 12–49)
MCH RBC QN AUTO: 29.7 PG (ref 26–34)
MCHC RBC AUTO-ENTMCNC: 32.9 G/DL (ref 30–36.5)
MCV RBC AUTO: 90.5 FL (ref 80–99)
MONOCYTES # BLD: 0.4 K/UL (ref 0–1)
MONOCYTES NFR BLD: 5 % (ref 5–13)
NEUTS SEG # BLD: 6.2 K/UL (ref 1.8–8)
NEUTS SEG NFR BLD: 75 % (ref 32–75)
NITRITE UR QL STRIP.AUTO: NEGATIVE
NRBC # BLD: 0 K/UL (ref 0–0.01)
NRBC BLD-RTO: 0 PER 100 WBC
PH UR STRIP: 7 [PH] (ref 5–8)
PLATELET # BLD AUTO: 141 K/UL (ref 150–400)
PMV BLD AUTO: 11 FL (ref 8.9–12.9)
POTASSIUM SERPL-SCNC: 4.2 MMOL/L (ref 3.5–5.1)
PROT SERPL-MCNC: 7.6 G/DL (ref 6.4–8.2)
PROT UR STRIP-MCNC: NEGATIVE MG/DL
RBC # BLD AUTO: 4.74 M/UL (ref 3.8–5.2)
RBC #/AREA URNS HPF: NORMAL /HPF (ref 0–5)
SODIUM SERPL-SCNC: 138 MMOL/L (ref 136–145)
SP GR UR REFRACTOMETRY: 1.01 (ref 1–1.03)
TROPONIN I SERPL-MCNC: <0.05 NG/ML
UA: UC IF INDICATED,UAUC: NORMAL
UROBILINOGEN UR QL STRIP.AUTO: 0.2 EU/DL (ref 0.2–1)
WBC # BLD AUTO: 8.3 K/UL (ref 3.6–11)
WBC URNS QL MICRO: NORMAL /HPF (ref 0–4)

## 2019-04-28 PROCEDURE — 84484 ASSAY OF TROPONIN QUANT: CPT

## 2019-04-28 PROCEDURE — 74011250636 HC RX REV CODE- 250/636: Performed by: EMERGENCY MEDICINE

## 2019-04-28 PROCEDURE — 96375 TX/PRO/DX INJ NEW DRUG ADDON: CPT

## 2019-04-28 PROCEDURE — 74011636320 HC RX REV CODE- 636/320: Performed by: EMERGENCY MEDICINE

## 2019-04-28 PROCEDURE — 74177 CT ABD & PELVIS W/CONTRAST: CPT

## 2019-04-28 PROCEDURE — 96374 THER/PROPH/DIAG INJ IV PUSH: CPT

## 2019-04-28 PROCEDURE — 65270000029 HC RM PRIVATE

## 2019-04-28 PROCEDURE — 36415 COLL VENOUS BLD VENIPUNCTURE: CPT

## 2019-04-28 PROCEDURE — 99285 EMERGENCY DEPT VISIT HI MDM: CPT

## 2019-04-28 PROCEDURE — 83690 ASSAY OF LIPASE: CPT

## 2019-04-28 PROCEDURE — 81001 URINALYSIS AUTO W/SCOPE: CPT

## 2019-04-28 PROCEDURE — 74011250636 HC RX REV CODE- 250/636: Performed by: SURGERY

## 2019-04-28 PROCEDURE — 93005 ELECTROCARDIOGRAM TRACING: CPT

## 2019-04-28 PROCEDURE — 85025 COMPLETE CBC W/AUTO DIFF WBC: CPT

## 2019-04-28 PROCEDURE — 80053 COMPREHEN METABOLIC PANEL: CPT

## 2019-04-28 RX ORDER — ENOXAPARIN SODIUM 100 MG/ML
40 INJECTION SUBCUTANEOUS DAILY
Status: DISCONTINUED | OUTPATIENT
Start: 2019-04-29 | End: 2019-05-10 | Stop reason: HOSPADM

## 2019-04-28 RX ORDER — VITAMIN E 268 MG
400 CAPSULE ORAL DAILY
COMMUNITY
End: 2022-02-22

## 2019-04-28 RX ORDER — DEXTROSE, SODIUM CHLORIDE, AND POTASSIUM CHLORIDE 5; .45; .15 G/100ML; G/100ML; G/100ML
125 INJECTION INTRAVENOUS CONTINUOUS
Status: DISCONTINUED | OUTPATIENT
Start: 2019-04-28 | End: 2019-05-02

## 2019-04-28 RX ORDER — LORAZEPAM 2 MG/ML
1 INJECTION INTRAMUSCULAR
Status: DISCONTINUED | OUTPATIENT
Start: 2019-04-28 | End: 2019-05-02

## 2019-04-28 RX ORDER — MORPHINE SULFATE 2 MG/ML
2 INJECTION, SOLUTION INTRAMUSCULAR; INTRAVENOUS
Status: DISCONTINUED | OUTPATIENT
Start: 2019-04-28 | End: 2019-05-09 | Stop reason: SINTOL

## 2019-04-28 RX ORDER — FLUOXETINE HYDROCHLORIDE 20 MG/5ML
20 LIQUID ORAL DAILY
Status: DISCONTINUED | OUTPATIENT
Start: 2019-04-29 | End: 2019-05-10 | Stop reason: HOSPADM

## 2019-04-28 RX ORDER — KETOROLAC TROMETHAMINE 30 MG/ML
15 INJECTION, SOLUTION INTRAMUSCULAR; INTRAVENOUS
Status: COMPLETED | OUTPATIENT
Start: 2019-04-28 | End: 2019-04-28

## 2019-04-28 RX ORDER — SODIUM CHLORIDE 0.9 % (FLUSH) 0.9 %
10 SYRINGE (ML) INJECTION
Status: COMPLETED | OUTPATIENT
Start: 2019-04-28 | End: 2019-04-28

## 2019-04-28 RX ORDER — DIPHENHYDRAMINE HYDROCHLORIDE 50 MG/ML
25 INJECTION, SOLUTION INTRAMUSCULAR; INTRAVENOUS
Status: DISCONTINUED | OUTPATIENT
Start: 2019-04-28 | End: 2019-05-02

## 2019-04-28 RX ORDER — SODIUM CHLORIDE 0.9 % (FLUSH) 0.9 %
5-40 SYRINGE (ML) INJECTION EVERY 8 HOURS
Status: DISCONTINUED | OUTPATIENT
Start: 2019-04-28 | End: 2019-05-03

## 2019-04-28 RX ORDER — ONDANSETRON 2 MG/ML
4 INJECTION INTRAMUSCULAR; INTRAVENOUS
Status: DISCONTINUED | OUTPATIENT
Start: 2019-04-28 | End: 2019-05-10 | Stop reason: HOSPADM

## 2019-04-28 RX ORDER — NALOXONE HYDROCHLORIDE 0.4 MG/ML
0.4 INJECTION, SOLUTION INTRAMUSCULAR; INTRAVENOUS; SUBCUTANEOUS AS NEEDED
Status: DISCONTINUED | OUTPATIENT
Start: 2019-04-28 | End: 2019-05-10 | Stop reason: HOSPADM

## 2019-04-28 RX ORDER — RANITIDINE 150 MG/1
150 TABLET, FILM COATED ORAL
COMMUNITY
End: 2022-02-22

## 2019-04-28 RX ORDER — SODIUM CHLORIDE 0.9 % (FLUSH) 0.9 %
5-40 SYRINGE (ML) INJECTION AS NEEDED
Status: DISCONTINUED | OUTPATIENT
Start: 2019-04-28 | End: 2019-05-06 | Stop reason: SDUPTHER

## 2019-04-28 RX ORDER — CLEMASTINE FUMARATE 2.68 MG/1
1.34 TABLET ORAL
COMMUNITY

## 2019-04-28 RX ORDER — MORPHINE SULFATE 2 MG/ML
2 INJECTION, SOLUTION INTRAMUSCULAR; INTRAVENOUS
Status: COMPLETED | OUTPATIENT
Start: 2019-04-28 | End: 2019-04-28

## 2019-04-28 RX ADMIN — Medication 10 ML: at 21:45

## 2019-04-28 RX ADMIN — MORPHINE SULFATE 2 MG: 2 INJECTION, SOLUTION INTRAMUSCULAR; INTRAVENOUS at 14:38

## 2019-04-28 RX ADMIN — Medication 10 ML: at 13:01

## 2019-04-28 RX ADMIN — IOPAMIDOL 100 ML: 755 INJECTION, SOLUTION INTRAVENOUS at 13:01

## 2019-04-28 RX ADMIN — Medication 10 ML: at 18:48

## 2019-04-28 RX ADMIN — KETOROLAC TROMETHAMINE 15 MG: 30 INJECTION, SOLUTION INTRAMUSCULAR at 12:02

## 2019-04-28 RX ADMIN — MORPHINE SULFATE 2 MG: 2 INJECTION, SOLUTION INTRAMUSCULAR; INTRAVENOUS at 22:35

## 2019-04-28 RX ADMIN — DEXTROSE MONOHYDRATE, SODIUM CHLORIDE, AND POTASSIUM CHLORIDE 125 ML/HR: 50; 4.5; 1.49 INJECTION, SOLUTION INTRAVENOUS at 18:48

## 2019-04-28 RX ADMIN — LORAZEPAM 1 MG: 2 INJECTION INTRAMUSCULAR; INTRAVENOUS at 16:30

## 2019-04-28 NOTE — PROGRESS NOTES
5: 5 RN called ED to get report on patient. Phone was not answered by ED. 
5:15 RN called ED to get report on patient. RN called charge nurse for ED, charge nurse transferred me to RN and Phone was not answered by ED. 
5:32 RN called ED to get report on patient. Phone was not answered by ED. Patient has been in room for 25 + minutes.

## 2019-04-28 NOTE — PROGRESS NOTES
Pharmacy Clarification of Prior to Admission Medication Regimen     The patient was interviewed regarding clarification of the prior to admission medication regimen. The patient's daughter was present in room and obtained permission from patient to discuss drug regimen with visitor(s) present. The patient was questioned regarding use of any other inhalers, topical products, over the counter medications, herbal medications, vitamin products or ophthalmic/nasal/otic medication use. Information Obtained From: RX Query, Med List, Patient    Pertinent Pharmacy Findings: None    PTA medication list was corrected to the following:     Prior to Admission Medications   Prescriptions Last Dose Informant Patient Reported? Taking? ALPRAZolam (XANAX) 0.5 mg tablet 4/27/2019 at Unknown time Self Yes Yes   Sig: Take 0.5 mg by mouth two (2) times a day. CALCIUM CARBONATE/VITAMIN D3 (CALTRATE-600 PLUS VITAMIN D3 PO) 4/27/2019 at Unknown time Self Yes Yes   Sig: Take 1 Tab by mouth every morning. aspirin 81 mg tablet 4/27/2019 at Unknown time Self Yes Yes   Sig: Take 81 mg by mouth every morning. clemastine 2.68 mg tab tablet 4/27/2019 at Unknown time Self Yes Yes   Sig: Take 1.34 mg by mouth daily as needed (Allergies). fluoxetine (PROZAC) 40 mg capsule 4/27/2019 at Unknown time Self Yes No   Sig: Take 40 mg by mouth every morning. meclizine (ANTIVERT) 25 mg tablet 3/28/2019 at Unknown time Self Yes Yes   Sig: Take 25 mg by mouth three (3) times daily as needed for Dizziness (vertigo). Indications: sensation of spinning or whirling   polysorbate 80/glycerin (REFRESH DRY EYE THERAPY OP) 4/27/2019 at Unknown time Self Yes Yes   Sig: Apply 2 Drops to eye daily. raNITIdine (ZANTAC) 150 mg tablet 4/27/2019 at Unknown time Self Yes Yes   Sig: Take 150 mg by mouth daily as needed for Indigestion (acid reflux).    vit C/E/Zn/coppr/lutein/zeaxan (PRESERVISION AREDS 2 PO) 4/27/2019 at Unknown time Self Yes Yes   Sig: Take 40 mg by mouth daily. Indications: eye lubrication   vitamin E (AQUA GEMS) 400 unit capsule 4/27/2019 at Unknown time Self Yes Yes   Sig: Take 400 Units by mouth daily.       Facility-Administered Medications: None          Thank you,  Karissa Nelson Antionette  Medication History Pharmacy Technician

## 2019-04-28 NOTE — H&P
Surgery Consult Subjective:  
  
Randell Reyes is a 66 y.o. female who is being seen for evaluation of abdominal pain. The pain is located in the periumbilical   .  Pain is described as aching and pressure and measures 5/10 in intensity. Onset of pain was 8 hours ago. Aggravating factors include movement, activity, pressure and eating. Alleviating factors include none. Associated symptoms include nausea. She has not vomited. Pain has essentially resolved since being in the ER. She denies a change in bowel habits. Patient Active Problem List  
 Diagnosis Date Noted  SBO (small bowel obstruction) ([facility] [address] 04/28/2019  Dysphagia 08/01/2014  GERD (gastroesophageal reflux disease) 08/01/2014  Vertigo 05/06/2013  Dizziness 05/05/2013  Anxiety state, unspecified 05/05/2013  Esophageal motor disorder 11/12/2012  Epigastric abdominal pain 11/12/2012  Family history of colon cancer 02/21/2011  Family history of colonic polyps 02/21/2011 Past Medical History:  
Diagnosis Date  Anxiety  Arthritis  Cancer (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  
 pt denies any issues with dysphagia (food) at this time 2/15/17  GERD (gastroesophageal reflux disease)  IBS (irritable bowel syndrome)  Nausea & vomiting  Pneumonia age 8  PUD (peptic ulcer disease)  Stroke [facility]) 2007  
 2 \"mini-byers\" -as of 2/1/5/17 pt denies any residual effect  Unspecified adverse effect of anesthesia   
 sister-almost dies after surgeries. pt has never head of malignant hyperthermia and pt has never had difficulty during anesthesia Past Surgical History:  
Procedure Laterality Date  COLONOSCOPY N/A 6/9/2016 COLONOSCOPY performed by Cynthia Carias MD at [facility] ENDOSCOPY  
 HX BREAST BIOPSY Bilateral   
 benign  HX HYSTERECTOMY  HX LAP CHOLECYSTECTOMY  HX SALPINGO-OOPHORECTOMY \"left one ovary\"  HX SHOULDER ARTHROSCOPY Left  HX TONSILLECTOMY  HX TUBAL LIGATION    
 [place] COLONOSCOPY FLX DX W/COLLJ SPEC WHEN PFRMD  2/21/2011 Social History Tobacco Use  Smoking status: Never Smoker  Smokeless tobacco: Never Used Substance Use Topics  Alcohol use: No  
  
History reviewed. No pertinent family history. Current Facility-Administered Medications Medication Dose Route Frequency  sodium chloride (NS) flush 5-40 mL  5-40 mL IntraVENous Q8H  
 sodium chloride (NS) flush 5-40 mL  5-40 mL IntraVENous PRN  
 morphine injection 2 mg  2 mg IntraVENous Q4H PRN  
 naloxone (NARCAN) injection 0.4 mg  0.4 mg IntraVENous PRN  
 ondansetron (ZOFRAN) injection 4 mg  4 mg IntraVENous Q4H PRN  
 diphenhydrAMINE (BENADRYL) injection 25 mg  25 mg IntraVENous Q6H PRN  
 LORazepam (ATIVAN) injection 1 mg  1 mg IntraVENous Q6H PRN  
 [START ON 4/29/2019] enoxaparin (LOVENOX) injection 40 mg  40 mg SubCUTAneous DAILY  [START ON 4/29/2019] pantoprazole (PROTONIX) 40 mg in sodium chloride 0.9% 10 mL injection  40 mg IntraVENous DAILY  [START ON 4/29/2019] FLUoxetine (PROzac) 20 mg/5 mL (4 mg/mL) oral solution 20 mg  20 mg Oral DAILY  dextrose 5% - 0.45% NaCl with KCl 20 mEq/L infusion  125 mL/hr IntraVENous CONTINUOUS Current Outpatient Medications Medication Sig  clemastine 2.68 mg tab tablet Take 1.34 mg by mouth daily as needed (Allergies).  raNITIdine (ZANTAC) 150 mg tablet Take 150 mg by mouth daily as needed for Indigestion (acid reflux).  polysorbate 80/glycerin (REFRESH DRY EYE THERAPY OP) Apply 2 Drops to eye daily.  vitamin E (AQUA GEMS) 400 unit capsule Take 400 Units by mouth daily.  ALPRAZolam (XANAX) 0.5 mg tablet Take 0.5 mg by mouth two (2) times a day.  meclizine (ANTIVERT) 25 mg tablet Take 25 mg by mouth three (3) times daily as needed for Dizziness (vertigo). Indications: sensation of spinning or whirling  vit C/E/Zn/coppr/lutein/zeaxan (PRESERVISION AREDS 2 PO) Take 40 mg by mouth daily. Indications: eye lubrication  fluoxetine (PROZAC) 40 mg capsule Take 40 mg by mouth every morning.  aspirin 81 mg tablet Take 81 mg by mouth every morning.  CALCIUM CARBONATE/VITAMIN D3 (CALTRATE-600 PLUS VITAMIN D3 PO) Take 1 Tab by mouth every morning. Allergies Allergen Reactions  Demerol [Meperidine] Nausea and Vomiting  Codeine Not Reported This Time  Penicillins Rash and Itching Review of Systems: A comprehensive review of systems was negative except for that written in the History of Present Illness. Objective:  
 
  
Visit Vitals BP (!) 119/30 Pulse 69 Temp 98 °F (36.7 °C) Resp 14 Ht 5' 7\" (1.702 m) Wt 74.4 kg (164 lb) SpO2 99% BMI 25.69 kg/m² Physical Exam: 
GENERAL: alert, cooperative, no distress, appears stated age, EYE: conjunctivae/corneas clear., EOM's intact. , LUNG: clear to auscultation bilaterally, HEART: regular rate and rhythm, S1, S2 normal, no murmur, click, rub or gallop, ABDOMEN: soft, non-tender. Bowel sounds hypoaactivel. No masses,  no organomegaly mildly distended Imaging:  images and reports reviewed Lab/Data Review: 
BMP:  
Lab Results Component Value Date/Time  04/28/2019 11:53 AM  
 K 4.2 04/28/2019 11:53 AM  
  04/28/2019 11:53 AM  
 CO2 29 04/28/2019 11:53 AM  
 AGAP 5 04/28/2019 11:53 AM  
  (H) 04/28/2019 11:53 AM  
 BUN 12 04/28/2019 11:53 AM  
 CREA 0.96 04/28/2019 11:53 AM  
 GFRAA >60 04/28/2019 11:53 AM  
 GFRNA 56 (L) 04/28/2019 11:53 AM  
 
CBC:  
Lab Results Component Value Date/Time WBC 8.3 04/28/2019 11:53 AM  
 HGB 14.1 04/28/2019 11:53 AM  
 HCT 42.9 04/28/2019 11:53 AM  
  (L) 04/28/2019 11:53 AM  
 
 
 
Assessment:  
  
Abdominal pain, suspect SBO from adhesions Plan: 1.  I recommend proceeding with Conservative therapy:  Bowel rest.  
 
 2. Discussed aspects of surgical intervention, methods, risks (including by not limited to infection, bleeding, hematoma, and perforation of the intestines or solid organs), and the risks of general anesthetic. The patient understands the risks; any and all questions were answered to the patient's satisfaction. 3. Patient does wish to proceed with surgery.

## 2019-04-28 NOTE — ED PROVIDER NOTES
EMERGENCY DEPARTMENT HISTORY AND PHYSICAL EXAM      Date: 4/28/2019  Patient Name: Eduard Gallagher    History of Presenting Illness     Chief Complaint   Patient presents with    Abdominal Pain     RUQ pain-pt has hx of GI problems and dysphagia, pt of Dr. Sandy Whaley       History Provided By: Patient    HPI: Eduard Gallagher, 66 y.o. female with PMHx significant for IBS, dysphasia GERD, presents to the ED with cc of abdominal pain. Patient states that at 8 AM she had acute onset of severe mid abdominal pain. She describes it as sharp and achy, wrapping around to her back. Intensity waxes and wanes. She tried Gas-X and a fleets enema with no relief of her symptoms. Last bowel movement was yesterday. She is felt nauseous but has not thrown up. No urinary symptoms. No chest pain, fevers, cough or cold symptoms. Has a history of prior cholecystectomy and hysterectomy. She is followed by Dr. Jorge Luis Desouza from GI. PCP: Margarie Primrose, MD    There are no other complaints, changes, or physical findings at this time. Current Outpatient Medications   Medication Sig Dispense Refill    dilTIAZem (CARDIZEM) 30 mg tablet Take 30 mg by mouth.  vit A/vit C/vit E/zinc/copper (ICAPS AREDS PO) Take  by mouth.  ALPRAZolam (XANAX) 0.5 mg tablet Take  by mouth.  meclizine (ANTIVERT) 12.5 mg tablet Take 12.5 mg by mouth three (3) times daily as needed for Dizziness (vertigo). Indications: Vertigo      vit C/E/Zn/coppr/lutein/zeaxan (PRESERVISION AREDS 2 PO) Take 40 mg by mouth daily. Indications: eye lubrication      aluminum hydrox-magnesium carb (GAVISCON)  mg/15 mL suspension Take 15 mL by mouth as needed for Indigestion.  multivitamin (ONE A DAY) tablet Take 1 Tab by mouth daily.  B.infantis-B.ani-B.long-B.bifi (PROBIOTIC 4X) 10-15 mg Tab Take 1 Tab by mouth daily.  fluoxetine (PROZAC) 40 mg capsule Take 40 mg by mouth every morning.  aspirin 81 mg tablet Take 81 mg by mouth every morning.  lansoprazole (PREVACID) 15 mg capsule Take 15 mg by mouth Daily (before breakfast).  CALCIUM CARBONATE/VITAMIN D3 (CALTRATE-600 PLUS VITAMIN D3 PO) Take 1 Tab by mouth every morning. Past History     Past Medical History:  Past Medical History:   Diagnosis Date    Anxiety     Arthritis     Cancer ([facility] [address]     BCC & SCC of face & leg    Dysphagia 08/01/2014    pt denies any issues with dysphagia (food) at this time 2/15/17    GERD (gastroesophageal reflux disease)     IBS (irritable bowel syndrome)     Nausea & vomiting     Pneumonia age 8    PUD (peptic ulcer disease)     St[facility] 2007 2 \"mini-byers\" -as of 2/1/5/17 pt denies any residual effect    Unspecified adverse effect of anesthesia     sister-almost dies after surgeries. pt has never head of malignant hyperthermia and pt has never had difficulty during anesthesia     Past Surgical History:  Past Surgical History:   Procedure Laterality Date    COLONOSCOPY N/A 6/9/2016    COLONOSCOPY performed by Emiliano Ballard MD at \[facility]\"" ENDOSCOPY    HX BREAST BIOPSY Bilateral     benign    HX HYSTERECTOMY      HX LAP CHOLECYSTECTOMY      HX SALPINGO-OOPHORECTOMY      \"left one ovary\"    HX SHOULDER ARTHROSCOPY Left     HX TONSILLECTOMY      HX TUBAL LIGATION      [place] COLONOSCOPY FLX DX W/COLLJ SPEC WHEN PFRMD  2/21/2011          Family History:  History reviewed. No pertinent family history. Social History:  Social History     Tobacco Use    Smoking status: Never Smoker    Smokeless tobacco: Never Used   Substance Use Topics    Alcohol use: No    Drug use: No     Allergies: Allergies   Allergen Reactions    Demerol [Meperidine] Nausea and Vomiting    Codeine Not Reported This Time    Penicillins Rash and Itching     Review of Systems   Review of Systems   Constitutional: Negative for chills and fever. HENT: Negative for congestion, rhinorrhea and sore throat. Respiratory: Negative for cough and shortness of breath. Cardiovascular: Negative for chest pain. Gastrointestinal: Positive for abdominal pain. Negative for nausea and vomiting. Genitourinary: Negative for dysuria and urgency. Skin: Negative for rash. Neurological: Negative for dizziness, light-headedness and headaches. All other systems reviewed and are negative. Physical Exam   Physical Exam   Constitutional: She is oriented to person, place, and time. She appears well-developed and well-nourished. No distress. HENT:   Head: Normocephalic and atraumatic. Eyes: Pupils are equal, round, and reactive to light. Conjunctivae and EOM are normal.   Neck: Normal range of motion. Cardiovascular: Normal rate, regular rhythm and intact distal pulses. Pulmonary/Chest: Effort normal and breath sounds normal. No stridor. No respiratory distress. Abdominal: Soft. She exhibits no distension. There is tenderness (epigastric). There is no rebound and no guarding. Musculoskeletal: Normal range of motion. Neurological: She is alert and oriented to person, place, and time. Skin: Skin is warm and dry. Psychiatric: She has a normal mood and affect. Nursing note and vitals reviewed. Diagnostic Study Results   Labs -   No results found for this or any previous visit (from the past 12 hour(s)).   Recent Results (from the past 24 hour(s))   EKG, 12 LEAD, INITIAL    Collection Time: 04/28/19 11:46 AM   Result Value Ref Range    Ventricular Rate 70 BPM    Atrial Rate 70 BPM    P-R Interval 152 ms    QRS Duration 84 ms    Q-T Interval 412 ms    QTC Calculation (Bezet) 444 ms    Calculated P Axis 36 degrees    Calculated R Axis 23 degrees    Calculated T Axis 60 degrees    Diagnosis       Normal sinus rhythm  Normal ECG  When compared with ECG of 29-JUL-2014 14:35,  No significant change was found     CBC WITH AUTOMATED DIFF    Collection Time: 04/28/19 11:53 AM   Result Value Ref Range    WBC 8.3 3.6 - 11.0 K/uL    RBC 4.74 3.80 - 5.20 M/uL    HGB 14.1 11.5 - 16.0 g/dL    HCT 42.9 35.0 - 47.0 %    MCV 90.5 80.0 - 99.0 FL    MCH 29.7 26.0 - 34.0 PG    MCHC 32.9 30.0 - 36.5 g/dL    RDW 13.2 11.5 - 14.5 %    PLATELET 957 (L) 089 - 400 K/uL    MPV 11.0 8.9 - 12.9 FL    NRBC 0.0 0  WBC    ABSOLUTE NRBC 0.00 0.00 - 0.01 K/uL    NEUTROPHILS 75 32 - 75 %    LYMPHOCYTES 19 12 - 49 %    MONOCYTES 5 5 - 13 %    EOSINOPHILS 1 0 - 7 %    BASOPHILS 0 0 - 1 %    IMMATURE GRANULOCYTES 0 0.0 - 0.5 %    ABS. NEUTROPHILS 6.2 1.8 - 8.0 K/UL    ABS. LYMPHOCYTES 1.6 0.8 - 3.5 K/UL    ABS. MONOCYTES 0.4 0.0 - 1.0 K/UL    ABS. EOSINOPHILS 0.1 0.0 - 0.4 K/UL    ABS. BASOPHILS 0.0 0.0 - 0.1 K/UL    ABS. IMM. GRANS. 0.0 0.00 - 0.04 K/UL    DF AUTOMATED     METABOLIC PANEL, COMPREHENSIVE    Collection Time: 04/28/19 11:53 AM   Result Value Ref Range    Sodium 138 136 - 145 mmol/L    Potassium 4.2 3.5 - 5.1 mmol/L    Chloride 104 97 - 108 mmol/L    CO2 29 21 - 32 mmol/L    Anion gap 5 5 - 15 mmol/L    Glucose 108 (H) 65 - 100 mg/dL    BUN 12 6 - 20 MG/DL    Creatinine 0.96 0.55 - 1.02 MG/DL    BUN/Creatinine ratio 13 12 - 20      GFR est AA >60 >60 ml/min/1.73m2    GFR est non-AA 56 (L) >60 ml/min/1.73m2    Calcium 8.5 8.5 - 10.1 MG/DL    Bilirubin, total 0.5 0.2 - 1.0 MG/DL    ALT (SGPT) 28 12 - 78 U/L    AST (SGOT) 25 15 - 37 U/L    Alk.  phosphatase 79 45 - 117 U/L    Protein, total 7.6 6.4 - 8.2 g/dL    Albumin 3.9 3.5 - 5.0 g/dL    Globulin 3.7 2.0 - 4.0 g/dL    A-G Ratio 1.1 1.1 - 2.2     LIPASE    Collection Time: 04/28/19 11:53 AM   Result Value Ref Range    Lipase 101 73 - 393 U/L   TROPONIN I    Collection Time: 04/28/19 11:53 AM   Result Value Ref Range    Troponin-I, Qt. <0.05 <0.05 ng/mL   URINALYSIS W/ REFLEX CULTURE    Collection Time: 04/28/19  1:42 PM   Result Value Ref Range    Color YELLOW/STRAW      Appearance CLEAR CLEAR      Specific gravity 1.013 1.003 - 1.030      pH (UA) 7.0 5.0 - 8.0      Protein NEGATIVE  NEG mg/dL    Glucose NEGATIVE  NEG mg/dL    Ketone NEGATIVE NEG mg/dL    Bilirubin NEGATIVE  NEG      Blood NEGATIVE  NEG      Urobilinogen 0.2 0.2 - 1.0 EU/dL    Nitrites NEGATIVE  NEG      Leukocyte Esterase NEGATIVE  NEG      WBC 0-4 0 - 4 /hpf    RBC 0-5 0 - 5 /hpf    Epithelial cells FEW FEW /lpf    Bacteria NEGATIVE  NEG /hpf    UA:UC IF INDICATED CULTURE NOT INDICATED BY UA RESULT CNI      Hyaline cast 0-2 0 - 5 /lpf   METABOLIC PANEL, COMPREHENSIVE    Collection Time: 04/29/19  2:44 AM   Result Value Ref Range    Sodium 136 136 - 145 mmol/L    Potassium 4.3 3.5 - 5.1 mmol/L    Chloride 105 97 - 108 mmol/L    CO2 27 21 - 32 mmol/L    Anion gap 4 (L) 5 - 15 mmol/L    Glucose 131 (H) 65 - 100 mg/dL    BUN 12 6 - 20 MG/DL    Creatinine 0.93 0.55 - 1.02 MG/DL    BUN/Creatinine ratio 13 12 - 20      GFR est AA >60 >60 ml/min/1.73m2    GFR est non-AA 58 (L) >60 ml/min/1.73m2    Calcium 8.5 8.5 - 10.1 MG/DL    Bilirubin, total 0.7 0.2 - 1.0 MG/DL    ALT (SGPT) 23 12 - 78 U/L    AST (SGOT) 22 15 - 37 U/L    Alk. phosphatase 74 45 - 117 U/L    Protein, total 7.0 6.4 - 8.2 g/dL    Albumin 3.5 3.5 - 5.0 g/dL    Globulin 3.5 2.0 - 4.0 g/dL    A-G Ratio 1.0 (L) 1.1 - 2.2     CBC W/O DIFF    Collection Time: 04/29/19  2:44 AM   Result Value Ref Range    WBC 8.5 3.6 - 11.0 K/uL    RBC 4.63 3.80 - 5.20 M/uL    HGB 13.6 11.5 - 16.0 g/dL    HCT 41.3 35.0 - 47.0 %    MCV 89.2 80.0 - 99.0 FL    MCH 29.4 26.0 - 34.0 PG    MCHC 32.9 30.0 - 36.5 g/dL    RDW 13.2 11.5 - 14.5 %    PLATELET 915 978 - 806 K/uL    MPV 10.9 8.9 - 12.9 FL    NRBC 0.0 0  WBC    ABSOLUTE NRBC 0.00 0.00 - 0.01 K/uL   MAGNESIUM    Collection Time: 04/29/19  2:44 AM   Result Value Ref Range    Magnesium 2.2 1.6 - 2.4 mg/dL   PHOSPHORUS    Collection Time: 04/29/19  2:44 AM   Result Value Ref Range    Phosphorus 3.0 2.6 - 4.7 MG/DL         Radiologic Studies -   CT ABD PELV W CONT   Final Result   IMPRESSION:      1.  There are distended loops of small bowel in the left upper quadrant and left   abdomen with abrupt transition consistent with small bowel obstruction which   could be related to an adhesion versus internal hernia. There is mild mesenteric   edema in the left upper quadrant. No results found. Medical Decision Making   I am the first provider for this patient. I reviewed the vital signs, available nursing notes, past medical history, past surgical history, family history and social history. Vital Signs-Reviewed the patient's vital signs. Patient Vitals for the past 12 hrs:   Temp Pulse Resp BP SpO2   04/28/19 1134    142/58 100 %   04/28/19 1133 98 °F (36.7 °C) 69 14 142/58 100 %       Pulse Oximetry Analysis - 100% on ra    Cardiac Monitor:   Rate: 68 bpm  Rhythm: Normal Sinus Rhythm     ED EKG interpretation:  Rhythm: normal sinus rhythm; and regular . Rate (approx.): 70; Axis: normal; P wave: normal; QRS interval: normal ; ST/T wave: normal; Other findings: normal. This EKG was interpreted by LIVIA Baldwin MD,ED Provider. Records Reviewed: Nursing Notes and Old Medical Records    Provider Notes (Medical Decision Making):   Ddx: Gastritis, pancreatitis, colitis, IBS, small bowel obstruction, UTI, pyelonephritis    Plan for basic lab work, urinalysis, CT abdomen. Pain control and re-eval    ED Course:   Initial assessment performed. The patients presenting problems have been discussed, and they are in agreement with the care plan formulated and outlined with them. I have encouraged them to ask questions as they arise throughout their visit. CT scan shows SBO. Spoke with Dr. Heaven Botello, general surgery, will see patient for admission  Pain improved with IV pain medication  Is not vomiting    Disposition:  Admit to general surgery     Diagnosis     Clinical Impression:   1. Small bowel obstruction ([address]        This note will not be viewable in [employer].

## 2019-04-28 NOTE — PROGRESS NOTES
General Surgery End of Shift Note Bedside shift change report given to BERTHA Martinez (incoming nurse) by Melissa Cortez (outgoing nurse) on [facility]. Report included the following information SBAR, Kardex and MAR. Shift Summary: Patient was oriented to RN, tech and room. RN completed patient's admission database and dual skin assessment. Issues for Physician to Address:  None. Patient on Cardiac Monitoring? [] Yes 
[x] No 
 
Rhythm:   
 
 
 
 
 
Melissa Cortez

## 2019-04-28 NOTE — ED TRIAGE NOTES
Pt arrived via EMS to room 34 for cc abd pain. Per pt she has several GI issues which she sees Dr Franklyn Stapleton for. At approx 0800 today pt began having sharp pains in her upper abd radiating to her back. Per pt she took a Gas-X and a Fleets enema to try an relieve the pain but it did not help. Pt states \"I never have gas so I don't think its gas pain\". Pt reports having difficulty getting comfortable. Pt denies any chest pain, SOB, fever, chills, NVD. Pt is in no acute distress, VSS.

## 2019-04-29 ENCOUNTER — APPOINTMENT (OUTPATIENT)
Dept: GENERAL RADIOLOGY | Age: 79
DRG: 335 | End: 2019-04-29
Attending: SURGERY
Payer: MEDICARE

## 2019-04-29 LAB
ALBUMIN SERPL-MCNC: 3.5 G/DL (ref 3.5–5)
ALBUMIN/GLOB SERPL: 1 {RATIO} (ref 1.1–2.2)
ALP SERPL-CCNC: 74 U/L (ref 45–117)
ALT SERPL-CCNC: 23 U/L (ref 12–78)
ANION GAP SERPL CALC-SCNC: 4 MMOL/L (ref 5–15)
AST SERPL-CCNC: 22 U/L (ref 15–37)
ATRIAL RATE: 70 BPM
BILIRUB SERPL-MCNC: 0.7 MG/DL (ref 0.2–1)
BUN SERPL-MCNC: 12 MG/DL (ref 6–20)
BUN/CREAT SERPL: 13 (ref 12–20)
CALCIUM SERPL-MCNC: 8.5 MG/DL (ref 8.5–10.1)
CALCULATED P AXIS, ECG09: 36 DEGREES
CALCULATED R AXIS, ECG10: 23 DEGREES
CALCULATED T AXIS, ECG11: 60 DEGREES
CHLORIDE SERPL-SCNC: 105 MMOL/L (ref 97–108)
CO2 SERPL-SCNC: 27 MMOL/L (ref 21–32)
CREAT SERPL-MCNC: 0.93 MG/DL (ref 0.55–1.02)
DIAGNOSIS, 93000: NORMAL
ERYTHROCYTE [DISTWIDTH] IN BLOOD BY AUTOMATED COUNT: 13.2 % (ref 11.5–14.5)
GLOBULIN SER CALC-MCNC: 3.5 G/DL (ref 2–4)
GLUCOSE SERPL-MCNC: 131 MG/DL (ref 65–100)
HCT VFR BLD AUTO: 41.3 % (ref 35–47)
HGB BLD-MCNC: 13.6 G/DL (ref 11.5–16)
MAGNESIUM SERPL-MCNC: 2.2 MG/DL (ref 1.6–2.4)
MCH RBC QN AUTO: 29.4 PG (ref 26–34)
MCHC RBC AUTO-ENTMCNC: 32.9 G/DL (ref 30–36.5)
MCV RBC AUTO: 89.2 FL (ref 80–99)
NRBC # BLD: 0 K/UL (ref 0–0.01)
NRBC BLD-RTO: 0 PER 100 WBC
P-R INTERVAL, ECG05: 152 MS
PHOSPHATE SERPL-MCNC: 3 MG/DL (ref 2.6–4.7)
PLATELET # BLD AUTO: 156 K/UL (ref 150–400)
PMV BLD AUTO: 10.9 FL (ref 8.9–12.9)
POTASSIUM SERPL-SCNC: 4.3 MMOL/L (ref 3.5–5.1)
PROT SERPL-MCNC: 7 G/DL (ref 6.4–8.2)
Q-T INTERVAL, ECG07: 412 MS
QRS DURATION, ECG06: 84 MS
QTC CALCULATION (BEZET), ECG08: 444 MS
RBC # BLD AUTO: 4.63 M/UL (ref 3.8–5.2)
SODIUM SERPL-SCNC: 136 MMOL/L (ref 136–145)
VENTRICULAR RATE, ECG03: 70 BPM
WBC # BLD AUTO: 8.5 K/UL (ref 3.6–11)

## 2019-04-29 PROCEDURE — 84100 ASSAY OF PHOSPHORUS: CPT

## 2019-04-29 PROCEDURE — 74011250636 HC RX REV CODE- 250/636: Performed by: SURGERY

## 2019-04-29 PROCEDURE — 83735 ASSAY OF MAGNESIUM: CPT

## 2019-04-29 PROCEDURE — 71045 X-RAY EXAM CHEST 1 VIEW: CPT

## 2019-04-29 PROCEDURE — C9113 INJ PANTOPRAZOLE SODIUM, VIA: HCPCS | Performed by: SURGERY

## 2019-04-29 PROCEDURE — 65270000029 HC RM PRIVATE

## 2019-04-29 PROCEDURE — 80053 COMPREHEN METABOLIC PANEL: CPT

## 2019-04-29 PROCEDURE — 36415 COLL VENOUS BLD VENIPUNCTURE: CPT

## 2019-04-29 PROCEDURE — 74011250637 HC RX REV CODE- 250/637: Performed by: SURGERY

## 2019-04-29 PROCEDURE — 74011000250 HC RX REV CODE- 250: Performed by: SURGERY

## 2019-04-29 PROCEDURE — 85027 COMPLETE CBC AUTOMATED: CPT

## 2019-04-29 RX ORDER — SCOLOPAMINE TRANSDERMAL SYSTEM 1 MG/1
1 PATCH, EXTENDED RELEASE TRANSDERMAL
Status: DISCONTINUED | OUTPATIENT
Start: 2019-04-29 | End: 2019-05-10 | Stop reason: HOSPADM

## 2019-04-29 RX ADMIN — DEXTROSE MONOHYDRATE, SODIUM CHLORIDE, AND POTASSIUM CHLORIDE 125 ML/HR: 50; 4.5; 1.49 INJECTION, SOLUTION INTRAVENOUS at 10:59

## 2019-04-29 RX ADMIN — MORPHINE SULFATE 2 MG: 2 INJECTION, SOLUTION INTRAMUSCULAR; INTRAVENOUS at 16:44

## 2019-04-29 RX ADMIN — Medication 10 ML: at 05:19

## 2019-04-29 RX ADMIN — ONDANSETRON 4 MG: 2 INJECTION INTRAMUSCULAR; INTRAVENOUS at 06:35

## 2019-04-29 RX ADMIN — MORPHINE SULFATE 2 MG: 2 INJECTION, SOLUTION INTRAMUSCULAR; INTRAVENOUS at 02:36

## 2019-04-29 RX ADMIN — Medication 10 ML: at 14:12

## 2019-04-29 RX ADMIN — ENOXAPARIN SODIUM 40 MG: 40 INJECTION SUBCUTANEOUS at 08:37

## 2019-04-29 RX ADMIN — FLUOXETINE HYDROCHLORIDE 20 MG: 20 SOLUTION ORAL at 10:47

## 2019-04-29 RX ADMIN — ONDANSETRON 4 MG: 2 INJECTION INTRAMUSCULAR; INTRAVENOUS at 10:45

## 2019-04-29 RX ADMIN — SODIUM CHLORIDE 40 MG: 9 INJECTION, SOLUTION INTRAMUSCULAR; INTRAVENOUS; SUBCUTANEOUS at 08:37

## 2019-04-29 RX ADMIN — DEXTROSE MONOHYDRATE, SODIUM CHLORIDE, AND POTASSIUM CHLORIDE 125 ML/HR: 50; 4.5; 1.49 INJECTION, SOLUTION INTRAVENOUS at 02:30

## 2019-04-29 RX ADMIN — ONDANSETRON 4 MG: 2 INJECTION INTRAMUSCULAR; INTRAVENOUS at 16:44

## 2019-04-29 NOTE — PROGRESS NOTES
SURGERY PROGRESS NOTE Admit Date: 2019 Subjective:  
 
Patient states she is not much better. Has mild nausea. Pain is vague, mild and dull, no flatus or BMs Objective:  
 
Visit Vitals /44 Pulse 72 Temp 98.2 °F (36.8 °C) Resp 16 Ht 5' 7\" (1.702 m) Wt 74.4 kg (164 lb) SpO2 94% BMI 25.69 kg/m² Temp (24hrs), Av.2 °F (36.8 °C), Min:97.9 °F (36.6 °C), Max:98.6 °F (37 °C) No intake/output data recorded.  1901 -  0700 In: 1341.7 [I.V.:1341.7] Out: - Physical Exam:   
General:  alert, cooperative, no distress, appears stated age Abdomen: soft, bowel sounds active, non-tender Assessment:  
 
Active Problems: 
  SBO (small bowel obstruction) ([facility] [address] (2019) stable Plan:  
 
 
Continue present treatment

## 2019-04-29 NOTE — PROGRESS NOTES
Pt continues to have episodes of vomiting. Zofran given at 1045 to early for next dose:  
Dr Angeles Jones paged telephone order for NG tube placement.

## 2019-04-29 NOTE — PROGRESS NOTES
Progress Note Patient: Kaz Sanchez MRN: [id number]  SSN: [id number] YOB: 1940  Age: 66 y.o. Sex: female Admit Date: 4/28/2019 LOS: 1 day Subjective:  
 
Pt alert and oriented x4. C/o pain in abdomen that is made tolerable with medication. Multiple episodes of vomiting. Pt requested to have NG tube removed;ok per dr Peyton Flores. IV intact and infusing with no issues. Objective:  
 
Vitals:  
 04/29/19 0314 04/29/19 0709 04/29/19 1132 04/29/19 1517 BP: 124/46 (!) 107/39 120/44 157/64 Pulse: 67 64 72 77 Resp: 16 16 16 16 Temp: 98.3 °F (36.8 °C) 97.9 °F (36.6 °C) 98.2 °F (36.8 °C) 98.6 °F (37 °C) SpO2: 99% 94% 94% 98% Weight:      
Height:      
  
 
Intake and Output: 
Current Shift: 04/29 0701 - 04/29 1900 In: -  
Out: 250 [Urine:250] 2 unmeasurable urine episodes Physical Exam:  
GENERAL: alert, cooperative, no distress Lab/Data Review: 
Lab results reviewed. For significant abnormal values and values requiring intervention, see assessment and plan. Assessment:  
 
Active Problems: 
  SBO (small bowel obstruction) ([facility] [address] (4/28/2019) Plan:  
 
Continue to control pain; and monitor for nausea and vomiting. Signed By: Loraine Belcher April 29, 2019

## 2019-04-29 NOTE — PROGRESS NOTES
Pt stating there is something wrong with her esophagus and the tube needs to be removed or someone needs to come look at it. NG tube placed to low suction drained 650ml of green output immediately; 
Dr Anali Argueta paged Dr Anali Argueta returned call ok to remove at pt request

## 2019-04-30 ENCOUNTER — ANESTHESIA (OUTPATIENT)
Dept: SURGERY | Age: 79
DRG: 335 | End: 2019-04-30
Payer: MEDICARE

## 2019-04-30 ENCOUNTER — APPOINTMENT (OUTPATIENT)
Dept: GENERAL RADIOLOGY | Age: 79
DRG: 335 | End: 2019-04-30
Attending: SURGERY
Payer: MEDICARE

## 2019-04-30 ENCOUNTER — ANESTHESIA EVENT (OUTPATIENT)
Dept: SURGERY | Age: 79
DRG: 335 | End: 2019-04-30
Payer: MEDICARE

## 2019-04-30 LAB
ALBUMIN SERPL-MCNC: 3.2 G/DL (ref 3.5–5)
ALBUMIN/GLOB SERPL: 0.9 {RATIO} (ref 1.1–2.2)
ALP SERPL-CCNC: 71 U/L (ref 45–117)
ALT SERPL-CCNC: 19 U/L (ref 12–78)
ANION GAP SERPL CALC-SCNC: 15 MMOL/L (ref 5–15)
ANION GAP SERPL CALC-SCNC: 8 MMOL/L (ref 5–15)
AST SERPL-CCNC: 20 U/L (ref 15–37)
BASOPHILS # BLD: 0 K/UL (ref 0–0.1)
BASOPHILS # BLD: 0 K/UL (ref 0–0.1)
BASOPHILS NFR BLD: 0 % (ref 0–1)
BASOPHILS NFR BLD: 0 % (ref 0–1)
BILIRUB SERPL-MCNC: 0.8 MG/DL (ref 0.2–1)
BUN SERPL-MCNC: 15 MG/DL (ref 6–20)
BUN SERPL-MCNC: 23 MG/DL (ref 6–20)
BUN/CREAT SERPL: 13 (ref 12–20)
BUN/CREAT SERPL: 9 (ref 12–20)
CALCIUM SERPL-MCNC: 8.4 MG/DL (ref 8.5–10.1)
CALCIUM SERPL-MCNC: 8.8 MG/DL (ref 8.5–10.1)
CHLORIDE SERPL-SCNC: 104 MMOL/L (ref 97–108)
CHLORIDE SERPL-SCNC: 105 MMOL/L (ref 97–108)
CO2 SERPL-SCNC: 18 MMOL/L (ref 21–32)
CO2 SERPL-SCNC: 21 MMOL/L (ref 21–32)
CREAT SERPL-MCNC: 1.71 MG/DL (ref 0.55–1.02)
CREAT SERPL-MCNC: 1.72 MG/DL (ref 0.55–1.02)
DIFFERENTIAL METHOD BLD: ABNORMAL
DIFFERENTIAL METHOD BLD: ABNORMAL
EOSINOPHIL # BLD: 0 K/UL (ref 0–0.4)
EOSINOPHIL # BLD: 0 K/UL (ref 0–0.4)
EOSINOPHIL NFR BLD: 0 % (ref 0–7)
EOSINOPHIL NFR BLD: 0 % (ref 0–7)
ERYTHROCYTE [DISTWIDTH] IN BLOOD BY AUTOMATED COUNT: 13.3 % (ref 11.5–14.5)
ERYTHROCYTE [DISTWIDTH] IN BLOOD BY AUTOMATED COUNT: 13.3 % (ref 11.5–14.5)
GLOBULIN SER CALC-MCNC: 3.5 G/DL (ref 2–4)
GLUCOSE BLD STRIP.AUTO-MCNC: 164 MG/DL (ref 65–100)
GLUCOSE SERPL-MCNC: 168 MG/DL (ref 65–100)
GLUCOSE SERPL-MCNC: 180 MG/DL (ref 65–100)
HCT VFR BLD AUTO: 44.5 % (ref 35–47)
HCT VFR BLD AUTO: 54 % (ref 35–47)
HGB BLD-MCNC: 15.2 G/DL (ref 11.5–16)
HGB BLD-MCNC: 17.7 G/DL (ref 11.5–16)
IMM GRANULOCYTES # BLD AUTO: 0.1 K/UL (ref 0–0.04)
IMM GRANULOCYTES # BLD AUTO: 0.1 K/UL (ref 0–0.04)
IMM GRANULOCYTES NFR BLD AUTO: 0 % (ref 0–0.5)
IMM GRANULOCYTES NFR BLD AUTO: 1 % (ref 0–0.5)
LACTATE SERPL-SCNC: 1.5 MMOL/L (ref 0.4–2)
LACTATE SERPL-SCNC: 2.3 MMOL/L (ref 0.4–2)
LYMPHOCYTES # BLD: 1.6 K/UL (ref 0.8–3.5)
LYMPHOCYTES # BLD: 1.6 K/UL (ref 0.8–3.5)
LYMPHOCYTES NFR BLD: 8 % (ref 12–49)
LYMPHOCYTES NFR BLD: 9 % (ref 12–49)
MAGNESIUM SERPL-MCNC: 2.1 MG/DL (ref 1.6–2.4)
MCH RBC QN AUTO: 29.9 PG (ref 26–34)
MCH RBC QN AUTO: 30 PG (ref 26–34)
MCHC RBC AUTO-ENTMCNC: 32.8 G/DL (ref 30–36.5)
MCHC RBC AUTO-ENTMCNC: 34.2 G/DL (ref 30–36.5)
MCV RBC AUTO: 87.9 FL (ref 80–99)
MCV RBC AUTO: 91.4 FL (ref 80–99)
MONOCYTES # BLD: 1.7 K/UL (ref 0–1)
MONOCYTES # BLD: 1.8 K/UL (ref 0–1)
MONOCYTES NFR BLD: 10 % (ref 5–13)
MONOCYTES NFR BLD: 9 % (ref 5–13)
NEUTS SEG # BLD: 15.2 K/UL (ref 1.8–8)
NEUTS SEG # BLD: 16.4 K/UL (ref 1.8–8)
NEUTS SEG NFR BLD: 81 % (ref 32–75)
NEUTS SEG NFR BLD: 83 % (ref 32–75)
NRBC # BLD: 0 K/UL (ref 0–0.01)
NRBC # BLD: 0 K/UL (ref 0–0.01)
NRBC BLD-RTO: 0 PER 100 WBC
NRBC BLD-RTO: 0 PER 100 WBC
PLATELET # BLD AUTO: 184 K/UL (ref 150–400)
PLATELET # BLD AUTO: 209 K/UL (ref 150–400)
PMV BLD AUTO: 11.1 FL (ref 8.9–12.9)
PMV BLD AUTO: 11.3 FL (ref 8.9–12.9)
POTASSIUM SERPL-SCNC: 4.5 MMOL/L (ref 3.5–5.1)
POTASSIUM SERPL-SCNC: 4.6 MMOL/L (ref 3.5–5.1)
PROT SERPL-MCNC: 6.7 G/DL (ref 6.4–8.2)
RBC # BLD AUTO: 5.06 M/UL (ref 3.8–5.2)
RBC # BLD AUTO: 5.91 M/UL (ref 3.8–5.2)
SERVICE CMNT-IMP: ABNORMAL
SODIUM SERPL-SCNC: 134 MMOL/L (ref 136–145)
SODIUM SERPL-SCNC: 137 MMOL/L (ref 136–145)
WBC # BLD AUTO: 18.8 K/UL (ref 3.6–11)
WBC # BLD AUTO: 19.8 K/UL (ref 3.6–11)

## 2019-04-30 PROCEDURE — 77030009403 HC ELECTRD ENDO MEGA -B: Performed by: SURGERY

## 2019-04-30 PROCEDURE — 87040 BLOOD CULTURE FOR BACTERIA: CPT

## 2019-04-30 PROCEDURE — 94760 N-INVAS EAR/PLS OXIMETRY 1: CPT

## 2019-04-30 PROCEDURE — 0DNL4ZZ RELEASE TRANSVERSE COLON, PERCUTANEOUS ENDOSCOPIC APPROACH: ICD-10-PCS | Performed by: SURGERY

## 2019-04-30 PROCEDURE — 58660 LAPAROSCOPY LYSIS: CPT | Performed by: SURGERY

## 2019-04-30 PROCEDURE — 74022 RADEX COMPL AQT ABD SERIES: CPT

## 2019-04-30 PROCEDURE — 74011250636 HC RX REV CODE- 250/636

## 2019-04-30 PROCEDURE — 77030016151 HC PROTCTR LNS DFOG COVD -B: Performed by: SURGERY

## 2019-04-30 PROCEDURE — 74011000250 HC RX REV CODE- 250: Performed by: SURGERY

## 2019-04-30 PROCEDURE — 77030039266 HC ADH SKN EXOFIN S2SG -A: Performed by: SURGERY

## 2019-04-30 PROCEDURE — 77030008684 HC TU ET CUF COVD -B: Performed by: ANESTHESIOLOGY

## 2019-04-30 PROCEDURE — 77030019908 HC STETH ESOPH SIMS -A: Performed by: ANESTHESIOLOGY

## 2019-04-30 PROCEDURE — C9113 INJ PANTOPRAZOLE SODIUM, VIA: HCPCS | Performed by: SURGERY

## 2019-04-30 PROCEDURE — 65660000000 HC RM CCU STEPDOWN

## 2019-04-30 PROCEDURE — 74011000258 HC RX REV CODE- 258: Performed by: SURGERY

## 2019-04-30 PROCEDURE — 85025 COMPLETE CBC W/AUTO DIFF WBC: CPT

## 2019-04-30 PROCEDURE — 83735 ASSAY OF MAGNESIUM: CPT

## 2019-04-30 PROCEDURE — 77030020747 HC TU INSUF ENDOSC TELE -A: Performed by: SURGERY

## 2019-04-30 PROCEDURE — 83605 ASSAY OF LACTIC ACID: CPT

## 2019-04-30 PROCEDURE — 76060000033 HC ANESTHESIA 1 TO 1.5 HR: Performed by: SURGERY

## 2019-04-30 PROCEDURE — 77030035051: Performed by: SURGERY

## 2019-04-30 PROCEDURE — 77030035048 HC TRCR ENDOSC OPTCL COVD -B: Performed by: SURGERY

## 2019-04-30 PROCEDURE — 74011000250 HC RX REV CODE- 250

## 2019-04-30 PROCEDURE — 77030018836 HC SOL IRR NACL ICUM -A: Performed by: SURGERY

## 2019-04-30 PROCEDURE — 77030020263 HC SOL INJ SOD CL0.9% LFCR 1000ML: Performed by: SURGERY

## 2019-04-30 PROCEDURE — 77030008756 HC TU IRR SUC STRY -B: Performed by: SURGERY

## 2019-04-30 PROCEDURE — 0DNV4ZZ RELEASE MESENTERY, PERCUTANEOUS ENDOSCOPIC APPROACH: ICD-10-PCS | Performed by: SURGERY

## 2019-04-30 PROCEDURE — 77030002933 HC SUT MCRYL J&J -A: Performed by: SURGERY

## 2019-04-30 PROCEDURE — 0DNA4ZZ RELEASE JEJUNUM, PERCUTANEOUS ENDOSCOPIC APPROACH: ICD-10-PCS | Performed by: SURGERY

## 2019-04-30 PROCEDURE — 77030011640 HC PAD GRND REM COVD -A: Performed by: SURGERY

## 2019-04-30 PROCEDURE — P9045 ALBUMIN (HUMAN), 5%, 250 ML: HCPCS

## 2019-04-30 PROCEDURE — 74011250636 HC RX REV CODE- 250/636: Performed by: SURGERY

## 2019-04-30 PROCEDURE — 77030026438 HC STYL ET INTUB CARD -A: Performed by: ANESTHESIOLOGY

## 2019-04-30 PROCEDURE — 76010000149 HC OR TIME 1 TO 1.5 HR: Performed by: SURGERY

## 2019-04-30 PROCEDURE — 36415 COLL VENOUS BLD VENIPUNCTURE: CPT

## 2019-04-30 PROCEDURE — 76210000006 HC OR PH I REC 0.5 TO 1 HR: Performed by: SURGERY

## 2019-04-30 PROCEDURE — 77030034850: Performed by: SURGERY

## 2019-04-30 PROCEDURE — 77030008771 HC TU NG SALEM SUMP -A: Performed by: ANESTHESIOLOGY

## 2019-04-30 PROCEDURE — 77030032490 HC SLV COMPR SCD KNE COVD -B: Performed by: SURGERY

## 2019-04-30 PROCEDURE — 82962 GLUCOSE BLOOD TEST: CPT

## 2019-04-30 PROCEDURE — 80053 COMPREHEN METABOLIC PANEL: CPT

## 2019-04-30 PROCEDURE — 77030031139 HC SUT VCRL2 J&J -A: Performed by: SURGERY

## 2019-04-30 RX ORDER — SUCCINYLCHOLINE CHLORIDE 20 MG/ML
INJECTION INTRAMUSCULAR; INTRAVENOUS AS NEEDED
Status: DISCONTINUED | OUTPATIENT
Start: 2019-04-30 | End: 2019-04-30 | Stop reason: HOSPADM

## 2019-04-30 RX ORDER — ROCURONIUM BROMIDE 10 MG/ML
INJECTION, SOLUTION INTRAVENOUS AS NEEDED
Status: DISCONTINUED | OUTPATIENT
Start: 2019-04-30 | End: 2019-04-30 | Stop reason: HOSPADM

## 2019-04-30 RX ORDER — GLYCOPYRROLATE 0.2 MG/ML
INJECTION INTRAMUSCULAR; INTRAVENOUS AS NEEDED
Status: DISCONTINUED | OUTPATIENT
Start: 2019-04-30 | End: 2019-04-30 | Stop reason: HOSPADM

## 2019-04-30 RX ORDER — SODIUM CHLORIDE 0.9 % (FLUSH) 0.9 %
5-40 SYRINGE (ML) INJECTION EVERY 8 HOURS
Status: DISCONTINUED | OUTPATIENT
Start: 2019-04-30 | End: 2019-04-30 | Stop reason: HOSPADM

## 2019-04-30 RX ORDER — SODIUM CHLORIDE, SODIUM LACTATE, POTASSIUM CHLORIDE, CALCIUM CHLORIDE 600; 310; 30; 20 MG/100ML; MG/100ML; MG/100ML; MG/100ML
25 INJECTION, SOLUTION INTRAVENOUS CONTINUOUS
Status: DISCONTINUED | OUTPATIENT
Start: 2019-04-30 | End: 2019-04-30 | Stop reason: HOSPADM

## 2019-04-30 RX ORDER — SODIUM CHLORIDE, SODIUM LACTATE, POTASSIUM CHLORIDE, CALCIUM CHLORIDE 600; 310; 30; 20 MG/100ML; MG/100ML; MG/100ML; MG/100ML
INJECTION, SOLUTION INTRAVENOUS
Status: DISCONTINUED | OUTPATIENT
Start: 2019-04-30 | End: 2019-04-30 | Stop reason: HOSPADM

## 2019-04-30 RX ORDER — ONDANSETRON 2 MG/ML
4 INJECTION INTRAMUSCULAR; INTRAVENOUS AS NEEDED
Status: DISCONTINUED | OUTPATIENT
Start: 2019-04-30 | End: 2019-04-30 | Stop reason: HOSPADM

## 2019-04-30 RX ORDER — HYDROMORPHONE HYDROCHLORIDE 1 MG/ML
.2-.5 INJECTION, SOLUTION INTRAMUSCULAR; INTRAVENOUS; SUBCUTANEOUS
Status: DISCONTINUED | OUTPATIENT
Start: 2019-04-30 | End: 2019-04-30 | Stop reason: HOSPADM

## 2019-04-30 RX ORDER — SODIUM CHLORIDE 0.9 % (FLUSH) 0.9 %
5-40 SYRINGE (ML) INJECTION EVERY 8 HOURS
Status: DISCONTINUED | OUTPATIENT
Start: 2019-04-30 | End: 2019-05-10 | Stop reason: HOSPADM

## 2019-04-30 RX ORDER — MORPHINE SULFATE 10 MG/ML
2 INJECTION, SOLUTION INTRAMUSCULAR; INTRAVENOUS
Status: DISCONTINUED | OUTPATIENT
Start: 2019-04-30 | End: 2019-04-30 | Stop reason: HOSPADM

## 2019-04-30 RX ORDER — SODIUM CHLORIDE 0.9 % (FLUSH) 0.9 %
5-40 SYRINGE (ML) INJECTION AS NEEDED
Status: DISCONTINUED | OUTPATIENT
Start: 2019-04-30 | End: 2019-05-06 | Stop reason: SDUPTHER

## 2019-04-30 RX ORDER — ACETAMINOPHEN 10 MG/ML
INJECTION, SOLUTION INTRAVENOUS AS NEEDED
Status: DISCONTINUED | OUTPATIENT
Start: 2019-04-30 | End: 2019-04-30 | Stop reason: HOSPADM

## 2019-04-30 RX ORDER — SODIUM CHLORIDE 0.9 % (FLUSH) 0.9 %
5-40 SYRINGE (ML) INJECTION AS NEEDED
Status: DISCONTINUED | OUTPATIENT
Start: 2019-04-30 | End: 2019-04-30 | Stop reason: HOSPADM

## 2019-04-30 RX ORDER — SODIUM CHLORIDE 0.9 % (FLUSH) 0.9 %
5-10 SYRINGE (ML) INJECTION AS NEEDED
Status: DISCONTINUED | OUTPATIENT
Start: 2019-04-30 | End: 2019-05-06 | Stop reason: SDUPTHER

## 2019-04-30 RX ORDER — SODIUM CHLORIDE 0.9 % (FLUSH) 0.9 %
5-40 SYRINGE (ML) INJECTION EVERY 8 HOURS
Status: DISCONTINUED | OUTPATIENT
Start: 2019-04-30 | End: 2019-05-03

## 2019-04-30 RX ORDER — ONDANSETRON 2 MG/ML
INJECTION INTRAMUSCULAR; INTRAVENOUS AS NEEDED
Status: DISCONTINUED | OUTPATIENT
Start: 2019-04-30 | End: 2019-04-30 | Stop reason: HOSPADM

## 2019-04-30 RX ORDER — LIDOCAINE HYDROCHLORIDE 20 MG/ML
INJECTION, SOLUTION EPIDURAL; INFILTRATION; INTRACAUDAL; PERINEURAL AS NEEDED
Status: DISCONTINUED | OUTPATIENT
Start: 2019-04-30 | End: 2019-04-30 | Stop reason: HOSPADM

## 2019-04-30 RX ORDER — PROPOFOL 10 MG/ML
INJECTION, EMULSION INTRAVENOUS AS NEEDED
Status: DISCONTINUED | OUTPATIENT
Start: 2019-04-30 | End: 2019-04-30 | Stop reason: HOSPADM

## 2019-04-30 RX ORDER — FENTANYL CITRATE 50 UG/ML
25 INJECTION, SOLUTION INTRAMUSCULAR; INTRAVENOUS
Status: DISCONTINUED | OUTPATIENT
Start: 2019-04-30 | End: 2019-04-30 | Stop reason: HOSPADM

## 2019-04-30 RX ORDER — DEXAMETHASONE SODIUM PHOSPHATE 4 MG/ML
INJECTION, SOLUTION INTRA-ARTICULAR; INTRALESIONAL; INTRAMUSCULAR; INTRAVENOUS; SOFT TISSUE AS NEEDED
Status: DISCONTINUED | OUTPATIENT
Start: 2019-04-30 | End: 2019-04-30 | Stop reason: HOSPADM

## 2019-04-30 RX ORDER — EPHEDRINE SULFATE 50 MG/ML
INJECTION, SOLUTION INTRAVENOUS AS NEEDED
Status: DISCONTINUED | OUTPATIENT
Start: 2019-04-30 | End: 2019-04-30 | Stop reason: HOSPADM

## 2019-04-30 RX ORDER — FENTANYL CITRATE 50 UG/ML
INJECTION, SOLUTION INTRAMUSCULAR; INTRAVENOUS AS NEEDED
Status: DISCONTINUED | OUTPATIENT
Start: 2019-04-30 | End: 2019-04-30 | Stop reason: HOSPADM

## 2019-04-30 RX ORDER — NEOSTIGMINE METHYLSULFATE 1 MG/ML
INJECTION INTRAVENOUS AS NEEDED
Status: DISCONTINUED | OUTPATIENT
Start: 2019-04-30 | End: 2019-04-30 | Stop reason: HOSPADM

## 2019-04-30 RX ORDER — CEFAZOLIN SODIUM 1 G/3ML
INJECTION, POWDER, FOR SOLUTION INTRAMUSCULAR; INTRAVENOUS AS NEEDED
Status: DISCONTINUED | OUTPATIENT
Start: 2019-04-30 | End: 2019-04-30 | Stop reason: HOSPADM

## 2019-04-30 RX ORDER — SODIUM CHLORIDE 0.9 % (FLUSH) 0.9 %
5-40 SYRINGE (ML) INJECTION AS NEEDED
Status: DISCONTINUED | OUTPATIENT
Start: 2019-04-30 | End: 2019-05-10 | Stop reason: HOSPADM

## 2019-04-30 RX ORDER — PHENYLEPHRINE HCL IN 0.9% NACL 0.4MG/10ML
SYRINGE (ML) INTRAVENOUS AS NEEDED
Status: DISCONTINUED | OUTPATIENT
Start: 2019-04-30 | End: 2019-04-30 | Stop reason: HOSPADM

## 2019-04-30 RX ORDER — DIPHENHYDRAMINE HYDROCHLORIDE 50 MG/ML
12.5 INJECTION, SOLUTION INTRAMUSCULAR; INTRAVENOUS AS NEEDED
Status: DISCONTINUED | OUTPATIENT
Start: 2019-04-30 | End: 2019-04-30 | Stop reason: HOSPADM

## 2019-04-30 RX ORDER — ALBUMIN HUMAN 50 G/1000ML
SOLUTION INTRAVENOUS AS NEEDED
Status: DISCONTINUED | OUTPATIENT
Start: 2019-04-30 | End: 2019-04-30 | Stop reason: HOSPADM

## 2019-04-30 RX ADMIN — Medication 10 ML: at 01:08

## 2019-04-30 RX ADMIN — ROCURONIUM BROMIDE 5 MG: 10 INJECTION, SOLUTION INTRAVENOUS at 16:32

## 2019-04-30 RX ADMIN — DEXTROSE MONOHYDRATE, SODIUM CHLORIDE, AND POTASSIUM CHLORIDE 125 ML/HR: 50; 4.5; 1.49 INJECTION, SOLUTION INTRAVENOUS at 09:28

## 2019-04-30 RX ADMIN — ALBUMIN HUMAN 250 ML: 50 SOLUTION INTRAVENOUS at 16:54

## 2019-04-30 RX ADMIN — SODIUM CHLORIDE 40 MG: 9 INJECTION, SOLUTION INTRAMUSCULAR; INTRAVENOUS; SUBCUTANEOUS at 09:28

## 2019-04-30 RX ADMIN — SODIUM CHLORIDE, SODIUM LACTATE, POTASSIUM CHLORIDE, CALCIUM CHLORIDE: 600; 310; 30; 20 INJECTION, SOLUTION INTRAVENOUS at 16:08

## 2019-04-30 RX ADMIN — NEOSTIGMINE METHYLSULFATE 3 MG: 1 INJECTION INTRAVENOUS at 17:39

## 2019-04-30 RX ADMIN — Medication 10 ML: at 21:46

## 2019-04-30 RX ADMIN — DEXAMETHASONE SODIUM PHOSPHATE 8 MG: 4 INJECTION, SOLUTION INTRA-ARTICULAR; INTRALESIONAL; INTRAMUSCULAR; INTRAVENOUS; SOFT TISSUE at 16:39

## 2019-04-30 RX ADMIN — ONDANSETRON 4 MG: 2 INJECTION INTRAMUSCULAR; INTRAVENOUS at 03:04

## 2019-04-30 RX ADMIN — CEFAZOLIN SODIUM 2 G: 1 INJECTION, POWDER, FOR SOLUTION INTRAMUSCULAR; INTRAVENOUS at 16:49

## 2019-04-30 RX ADMIN — EPHEDRINE SULFATE 10 MG: 50 INJECTION, SOLUTION INTRAVENOUS at 17:38

## 2019-04-30 RX ADMIN — PROPOFOL 160 MG: 10 INJECTION, EMULSION INTRAVENOUS at 16:32

## 2019-04-30 RX ADMIN — Medication 160 MCG: at 16:48

## 2019-04-30 RX ADMIN — Medication 200 MCG: at 16:34

## 2019-04-30 RX ADMIN — ACETAMINOPHEN 1000 MG: 10 INJECTION, SOLUTION INTRAVENOUS at 17:29

## 2019-04-30 RX ADMIN — EPHEDRINE SULFATE 10 MG: 50 INJECTION, SOLUTION INTRAVENOUS at 17:33

## 2019-04-30 RX ADMIN — GLYCOPYRROLATE 0.4 MG: 0.2 INJECTION INTRAMUSCULAR; INTRAVENOUS at 17:39

## 2019-04-30 RX ADMIN — ROCURONIUM BROMIDE 10 MG: 10 INJECTION, SOLUTION INTRAVENOUS at 16:48

## 2019-04-30 RX ADMIN — ENOXAPARIN SODIUM 40 MG: 40 INJECTION SUBCUTANEOUS at 09:28

## 2019-04-30 RX ADMIN — FENTANYL CITRATE 50 MCG: 50 INJECTION, SOLUTION INTRAMUSCULAR; INTRAVENOUS at 16:41

## 2019-04-30 RX ADMIN — Medication 10 ML: at 21:47

## 2019-04-30 RX ADMIN — ALBUMIN HUMAN 250 ML: 50 SOLUTION INTRAVENOUS at 17:13

## 2019-04-30 RX ADMIN — ONDANSETRON 4 MG: 2 INJECTION INTRAMUSCULAR; INTRAVENOUS at 17:31

## 2019-04-30 RX ADMIN — SUCCINYLCHOLINE CHLORIDE 100 MG: 20 INJECTION INTRAMUSCULAR; INTRAVENOUS at 16:32

## 2019-04-30 RX ADMIN — Medication 80 MCG: at 17:38

## 2019-04-30 RX ADMIN — LIDOCAINE HYDROCHLORIDE 80 MG: 20 INJECTION, SOLUTION EPIDURAL; INFILTRATION; INTRACAUDAL; PERINEURAL at 16:32

## 2019-04-30 RX ADMIN — ROCURONIUM BROMIDE 25 MG: 10 INJECTION, SOLUTION INTRAVENOUS at 16:41

## 2019-04-30 RX ADMIN — FENTANYL CITRATE 50 MCG: 50 INJECTION, SOLUTION INTRAMUSCULAR; INTRAVENOUS at 16:52

## 2019-04-30 RX ADMIN — PIPERACILLIN SODIUM,TAZOBACTAM SODIUM 4.5 G: 4; .5 INJECTION, POWDER, FOR SOLUTION INTRAVENOUS at 20:31

## 2019-04-30 RX ADMIN — FENTANYL CITRATE 50 MCG: 50 INJECTION, SOLUTION INTRAMUSCULAR; INTRAVENOUS at 16:32

## 2019-04-30 RX ADMIN — Medication 10 ML: at 14:00

## 2019-04-30 RX ADMIN — DEXTROSE MONOHYDRATE, SODIUM CHLORIDE, AND POTASSIUM CHLORIDE 125 ML/HR: 50; 4.5; 1.49 INJECTION, SOLUTION INTRAVENOUS at 22:59

## 2019-04-30 RX ADMIN — DEXTROSE MONOHYDRATE, SODIUM CHLORIDE, AND POTASSIUM CHLORIDE 125 ML/HR: 50; 4.5; 1.49 INJECTION, SOLUTION INTRAVENOUS at 01:06

## 2019-04-30 RX ADMIN — PIPERACILLIN SODIUM,TAZOBACTAM SODIUM 4.5 G: 4; .5 INJECTION, POWDER, FOR SOLUTION INTRAVENOUS at 11:36

## 2019-04-30 NOTE — PROGRESS NOTES
SURGERY PROGRESS NOTE Admit Date: 2019 POD Day of Surgery Procedure: Procedure(s): LAPAROSCOPY GENERAL DIAGNOSTIC POSSIBLE BOWEL RESECTION POSSIBLE OPEN Subjective:  
 
Patient is confused and febrile this morning. Rapid response called. Patient developed worsening nausea and vomiting overnight. She did not tolerate an NG tube. She states she has no pain or nausea this morning but, she does not answer questions appropriately. Objective:  
 
Visit Vitals /40 (BP 1 Location: Right arm, BP Patient Position: At rest) Pulse (!) 104 Temp 98.2 °F (36.8 °C) Resp 18 Ht 5' 7\" (1.702 m) Wt 74.4 kg (164 lb) SpO2 97% BMI 25.69 kg/m² Temp (24hrs), Av.7 °F (37.1 °C), Min:97.5 °F (36.4 °C), Max:102.7 °F (39.3 °C) 
 
 
701 -  1900 In: 118.8 [I.V.:118.8] Out: -  
 190 -  0700 In: 4493.8 [I.V.:4493.8] Out: 250 [Urine:250] Physical Exam:   
General:  alert, cooperative, no distress, appears stated age, confused Abdomen: soft, bowel sounds hypoactive, tender  In LUQ Lab Results Component Value Date/Time WBC 19.8 (H) 2019 10:07 AM  
 HGB 15.2 2019 10:07 AM  
 HCT 44.5 2019 10:07 AM  
 PLATELET 847  10:07 AM  
 MCV 87.9 2019 10:07 AM  
 
Lab Results Component Value Date/Time GFR est non-AA 29 (L) 2019 10:07 AM  
 GFR est AA 35 (L) 2019 10:07 AM  
 Creatinine 1.72 (H) 2019 10:07 AM  
 BUN 23 (H) 2019 10:07 AM  
 Sodium 134 (L) 2019 10:07 AM  
 Potassium 4.6 2019 10:07 AM  
 Chloride 105 2019 10:07 AM  
 CO2 21 2019 10:07 AM  
 Magnesium 2.1 2019 03:15 AM  
 Phosphorus 3.0 2019 02:44 AM  
 
 
Assessment:  
 
Active Problems: 
  SBO (small bowel obstruction) ([facility] [address] (2019) fever and mental status changes consistent with sepsis.     
 
Plan:  
 
 
1) fever work-up with cultures, CXR 
 2) given clinical decline and no other signs of sepsis, recommend abdominal exploration. Discussed with the patient's daughter. described diagnostic laparoscopy. Discussed alternative to surgery would be antibiotics and observation. Discussed risk including but not limited to bleeding, infection, injury to abdominal organs, worsening sepsis,  Anesthesia risks, and failure to find source. She understands and agrees to surgery.

## 2019-04-30 NOTE — PROGRESS NOTES
Spiritual Care Partner Volunteer visited patient in PCU on April 30, 2016. Documented by: 
Aditya Nunez, MPS, [facility],  [facility]  Paging Service  [phone])

## 2019-04-30 NOTE — PROGRESS NOTES
At 16:00 call to Milla Ceballos in holding regarding the redraw for a lactic acid due at 16:00 as the 10am lactic was 2.3. Gray tube sent to Milla Ceballos at tube 11. At 19:08 lab was not resulted. Call to lab to check status, spoke with Woo Hsieh who states there is not a lactic that is unresulted. Lactic will need to be drawn.

## 2019-04-30 NOTE — PROGRESS NOTES
Pt returned from OR, settled her in to room. Had PCT take first set of vitals. I did a quick assessment of Pt's pain level, orientation (no real change from the AM) and assessed the PT surgery sites. All wounds were clean, dry and intact. Gave oncoming nurse Omar Bourgeois report.

## 2019-04-30 NOTE — PROGRESS NOTES
Bedside shift change report given to Charissa Lira (oncoming nurse) by Deuce Garcia RN (offgoing nurse). Report included the following information SBAR, Kardex, Procedure Summary, Intake/Output, MAR and Recent Results. Patient had 3 episodes of green foul smelling emesis this night, no complaints of pain voiced.  Patient also oriented to self only this early AM

## 2019-04-30 NOTE — ANESTHESIA POSTPROCEDURE EVALUATION
Procedure(s): LAPAROSCOPY GENERAL DIAGNOSTIC LYSIS OF ADHESIONS. general 
 
Anesthesia Post Evaluation Patient location during evaluation: PACU Note status: Adequate. Level of consciousness: responsive to verbal stimuli and sleepy but conscious Pain management: satisfactory to patient Airway patency: patent Anesthetic complications: no 
Cardiovascular status: acceptable Respiratory status: acceptable Hydration status: acceptable Comments: +Post-Anesthesia Evaluation and Assessment Patient: Negrita Hernandez MRN: [id number]  SSN: [id number] YOB: 1940  Age: 66 y.o. Sex: female Cardiovascular Function/Vital Signs BP (!) 103/36   Pulse 94   Temp 37.1 °C (98.7 °F)   Resp 16   Ht 5' 7\" (1.702 m)   Wt 74.4 kg (164 lb)   SpO2 94%   BMI 25.69 kg/m² Patient is status post Procedure(s): LAPAROSCOPY GENERAL DIAGNOSTIC LYSIS OF ADHESIONS. Nausea/Vomiting: Controlled. Postoperative hydration reviewed and adequate. Pain: 
Pain Scale 1: Numeric (0 - 10) (04/30/19 1801) Pain Intensity 1: 0 (04/30/19 1801) Managed. Neurological Status:  
Neuro (WDL): Exceptions to WDL (04/30/19 1801) At baseline. Mental Status and Level of Consciousness: Arousable. Pulmonary Status:  
O2 Device: (P) Nasal cannula (04/30/19 1829) Adequate oxygenation and airway patent. Complications related to anesthesia: None Post-anesthesia assessment completed. No concerns. Signed By: Alessia Reyes MD  
 4/30/2019 Post anesthesia nausea and vomiting:  controlled Vitals Value Taken Time /36 4/30/2019  6:15 PM  
Temp 37.1 °C (98.7 °F) 4/30/2019  6:01 PM  
Pulse 90 4/30/2019  6:29 PM  
Resp 19 4/30/2019  6:29 PM  
SpO2 97 % 4/30/2019  6:29 PM  
Vitals shown include unvalidated device data.

## 2019-04-30 NOTE — PROGRESS NOTES
Pt left for OR 1535 Pt returned from [address] [facility] [address] at [address]

## 2019-04-30 NOTE — PERIOP NOTES
TRANSFER - OUT REPORT: 
 
Verbal report given to Juan R(name) on Cleatis Oyster  being transferred to [facility](unit) for routine post - op Report consisted of patients Situation, Background, Assessment and  
Recommendations(SBAR). Information from the following report(s) SBAR, Kardex, OR Summary and MAR was reviewed with the receiving nurse. Lines:  
Peripheral IV 04/30/19 Distal;Left;Posterior Forearm (Active) Site Assessment Clean, dry, & intact 4/30/2019  6:01 PM  
Phlebitis Assessment 0 4/30/2019  6:01 PM  
Infiltration Assessment 0 4/30/2019  6:01 PM  
Dressing Status Clean, dry, & intact 4/30/2019  6:01 PM  
Dressing Type Transparent 4/30/2019  6:01 PM  
Hub Color/Line Status Blue; Infusing 4/30/2019  6:01 PM  
   
Peripheral IV 04/30/19 Right Hand (Active) Site Assessment Clean, dry, & intact 4/30/2019  6:01 PM  
Phlebitis Assessment 0 4/30/2019  6:01 PM  
Infiltration Assessment 0 4/30/2019  6:01 PM  
Dressing Status Clean, dry, & intact 4/30/2019  6:01 PM  
Dressing Type Transparent 4/30/2019  6:01 PM  
Hub Color/Line Status Capped 4/30/2019  6:01 PM  
  
 
Opportunity for questions and clarification was provided. Patient transported with: 
 Monitor O2 @ 3 liters Registered Nurse Tech

## 2019-04-30 NOTE — PROGRESS NOTES
Pharmacy Automatic Renal Dosing Protocol - Antimicrobials Indication for Antimicrobials: Sepsis Current Regimen of Each Antimicrobial: 
Zosyn 3.375 gm q6h  (Start Date ; Day # 1) Previous Antimicrobial Therapy: 
 
 
Goal Level: 
 
Date Dose & Interval Measured (mcg/mL) Extrapolated (mcg/mL) Date & time of next level:  
 
Significant Cultures:  
 
 
Radiology / Imaging results: (X-ray, CT scan or MRI):  
 
Paralysis, amputations, malnutrition:  
 
Labs: 
Recent Labs 19 
0315 19 
0244 19 
1153 CREA 1.71* 0.93 0.96 BUN 15 12 12 WBC 18.8* 8.5 8.3 Temp (24hrs), Av.7 °F (37.1 °C), Min:97.5 °F (36.4 °C), Max:102.7 °F (39.3 °C) Creatinine Clearance (mL/min) or Dialysis: 28 Impression/Plan:  
· Sepsis; Increase in  Scr,  WBC, HGB, and ANC Is there some other underlying process ? · Will change Zosyn to 4.5 GM Q8H due to Tmax 102 and hematology results. · Allergy to PCN is rash - low - intolerance - is what is documented - have informed nurse to watch. · Antimicrobial stop date Pharmacy will follow daily and adjust medications as appropriate for renal function and/or serum levels. Thank you, 
Bozena Hamilton, [facility] Recommended duration of therapy 
http://[facility]/[place]/[facility]/Pharmacy/Clinical%20Companion/Duration%20of%20ABX%20therapy. docx Renal Dosing 
http://[facility]/[place]/[facility]/Pharmacy/Clinical%20Companion/Renal%[id number]. pdf

## 2019-04-30 NOTE — ANESTHESIA PREPROCEDURE EVALUATION
Anesthetic History PONV Review of Systems / Medical History Patient summary reviewed, nursing notes reviewed and pertinent labs reviewed Pulmonary Pneumonia Neuro/Psych  
 
 
 
TIA (x 2) and psychiatric history (Anxiety) Comments: Anxiety  Cardiovascular Within defined limits Exercise tolerance: >4 METS Comments: NSR  
GI/Hepatic/Renal 
  
GERD: well controlled PUD Comments: SMALL BOWEL OBSTRUCTION 
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 
 
Airway Mallampati: II 
TM Distance: 4 - 6 cm Neck ROM: normal range of motion Mouth opening: Normal 
 
 Cardiovascular Regular rate and rhythm,  S1 and S2 normal,  no murmur, click, rub, or gallop Rhythm: regular Rate: normal 
 
 
 
 Dental 
 
Dentition: Upper dentition intact and Lower dentition intact Pulmonary Breath sounds clear to auscultation Abdominal 
GI exam deferred Other Findings Anesthetic Plan ASA: 3 Anesthesia type: general 
 
Monitoring Plan: BIS Induction: RSI and Intravenous Anesthetic plan and risks discussed with: Patient

## 2019-04-30 NOTE — PROGRESS NOTES
Pt with MEWS=5, WBC 18.8 (8.5 yest), 98.4-113-22 95/58 91% RA. Dr Valentín Bustillo informed via [employer]. Tele ordered. Pt is also confused which is new. Dr Valentín Bustillo made aware. Call to Magalys Estes RN for a second look. 10:00 Blood Cx, lactic acid and urine cx ordered. Dr Valentín Bustillo aware. 10:19Call from xray, pt will go for CXR and abd xray shortly.

## 2019-04-30 NOTE — BRIEF OP NOTE
BRIEF OPERATIVE NOTE    Date of Procedure: 4/30/2019   Preoperative Diagnosis: SMALL BOWEL OBSTRUCTION  Postoperative Diagnosis: SMALL BOWEL OBSTRUCTION    Procedure(s):  LAPAROSCOPY GENERAL DIAGNOSTIC LYSIS OF ADHESIONS  Surgeon(s) and Role:     * Adela Al MD - Primary         Surgical Staff:  Circ-1: Padmini Pang  Circ-Relief: Rayshawn Easton RN  Circ-Intern: Lupillo Baird RN  Scrub Tech-1: Erasto Petite  Surg Asst-1: Zhane TAYLOR  Event Time In Time Out   Incision Start 1651    Incision Close 1738      Anesthesia: General   Estimated Blood Loss: 50cc  Specimens: * No specimens in log *   Findings: omental band to the root of the small bowel mesentery causing closed loop obstruction.   Entrapped bowel and its mesentery endematous and inflamed but appeared viable and had perestalsis   Complications: none  Implants: * No implants in log *

## 2019-04-30 NOTE — ROUTINE PROCESS
RAPID RESPONSE TEAM 
 
Stopped by staff while on rounds for elevated MEWS and confusion. Patient is sitting on bedside commode, awake but lethargic, with some global weakness. She maintains her own airway, respirations are equal, unlabored, and CTA. Unit staff placed her on 2LPM NC to keep SpO2>91%. Pulses are equal and regular. She returned to bed with minimal assistance and her mentation improved shortly thereafter. MEWS 5-6 per AM VS. WBC increased 8.5-18.8 since yesterday. She reportedly vomited x 3 last night. Patient going for chest and abdomen imaging. Blood cultures, lactic acid, chemistry, and CBC were drawn and sent to lab. Additional 20g PIV initiated in right arm in 1 attempt. Zosyn to be initiated as soon as available. D/W unit nursing staff. Dr. Valentín Bustillo notified by Lele Garcia RN via [employer]. Please consider 30mL/kg IV fluid bolus. No other RRT interventions indicated at this time. Please call back if needed. Gilbert Ceron ADDENDUM Patient taken to OR around 1400.

## 2019-04-30 NOTE — PERIOP NOTES
Handoff Report from Operating Room to PACU Report received from JESSICA Jimenez and CLAUDIA Sauer regarding Viridiana Mendoza. Surgeon(s): 
Soren Barksdale MD  And Procedure(s) (LRB): 
LAPAROSCOPY GENERAL DIAGNOSTIC LYSIS OF ADHESIONS (N/A)  confirmed  
with allergies discussed. Anesthesia type, drugs, patient history, complications, estimated blood loss, vital signs, intake and output, and lines and temperature were reviewed.

## 2019-05-01 LAB
ANION GAP SERPL CALC-SCNC: 7 MMOL/L (ref 5–15)
APPEARANCE UR: CLEAR
BACTERIA URNS QL MICRO: NEGATIVE /HPF
BILIRUB UR QL: NEGATIVE
BUN SERPL-MCNC: 20 MG/DL (ref 6–20)
BUN/CREAT SERPL: 15 (ref 12–20)
CALCIUM SERPL-MCNC: 7.8 MG/DL (ref 8.5–10.1)
CHLORIDE SERPL-SCNC: 107 MMOL/L (ref 97–108)
CO2 SERPL-SCNC: 21 MMOL/L (ref 21–32)
COLOR UR: ABNORMAL
CREAT SERPL-MCNC: 1.34 MG/DL (ref 0.55–1.02)
EPITH CASTS URNS QL MICRO: ABNORMAL /LPF
ERYTHROCYTE [DISTWIDTH] IN BLOOD BY AUTOMATED COUNT: 13.5 % (ref 11.5–14.5)
GLUCOSE SERPL-MCNC: 195 MG/DL (ref 65–100)
GLUCOSE UR STRIP.AUTO-MCNC: NEGATIVE MG/DL
HCT VFR BLD AUTO: 36.2 % (ref 35–47)
HGB BLD-MCNC: 11.8 G/DL (ref 11.5–16)
HGB UR QL STRIP: ABNORMAL
HYALINE CASTS URNS QL MICRO: ABNORMAL /LPF (ref 0–5)
KETONES UR QL STRIP.AUTO: NEGATIVE MG/DL
LEUKOCYTE ESTERASE UR QL STRIP.AUTO: NEGATIVE
MAGNESIUM SERPL-MCNC: 1.6 MG/DL (ref 1.6–2.4)
MCH RBC QN AUTO: 29.5 PG (ref 26–34)
MCHC RBC AUTO-ENTMCNC: 32.6 G/DL (ref 30–36.5)
MCV RBC AUTO: 90.5 FL (ref 80–99)
NITRITE UR QL STRIP.AUTO: NEGATIVE
NRBC # BLD: 0 K/UL (ref 0–0.01)
NRBC BLD-RTO: 0 PER 100 WBC
PH UR STRIP: 6 [PH] (ref 5–8)
PLATELET # BLD AUTO: 117 K/UL (ref 150–400)
PMV BLD AUTO: 11.5 FL (ref 8.9–12.9)
POTASSIUM SERPL-SCNC: 4.7 MMOL/L (ref 3.5–5.1)
PROT UR STRIP-MCNC: 30 MG/DL
RBC # BLD AUTO: 4 M/UL (ref 3.8–5.2)
RBC #/AREA URNS HPF: ABNORMAL /HPF (ref 0–5)
SODIUM SERPL-SCNC: 135 MMOL/L (ref 136–145)
SP GR UR REFRACTOMETRY: 1.03 (ref 1–1.03)
UA: UC IF INDICATED,UAUC: ABNORMAL
UROBILINOGEN UR QL STRIP.AUTO: 0.2 EU/DL (ref 0.2–1)
WBC # BLD AUTO: 7.2 K/UL (ref 3.6–11)
WBC URNS QL MICRO: ABNORMAL /HPF (ref 0–4)

## 2019-05-01 PROCEDURE — 74011000258 HC RX REV CODE- 258: Performed by: SURGERY

## 2019-05-01 PROCEDURE — 97161 PT EVAL LOW COMPLEX 20 MIN: CPT

## 2019-05-01 PROCEDURE — 97116 GAIT TRAINING THERAPY: CPT

## 2019-05-01 PROCEDURE — 36415 COLL VENOUS BLD VENIPUNCTURE: CPT

## 2019-05-01 PROCEDURE — 97165 OT EVAL LOW COMPLEX 30 MIN: CPT

## 2019-05-01 PROCEDURE — 97535 SELF CARE MNGMENT TRAINING: CPT

## 2019-05-01 PROCEDURE — 65660000000 HC RM CCU STEPDOWN

## 2019-05-01 PROCEDURE — C9113 INJ PANTOPRAZOLE SODIUM, VIA: HCPCS | Performed by: SURGERY

## 2019-05-01 PROCEDURE — 85027 COMPLETE CBC AUTOMATED: CPT

## 2019-05-01 PROCEDURE — 97530 THERAPEUTIC ACTIVITIES: CPT

## 2019-05-01 PROCEDURE — 94760 N-INVAS EAR/PLS OXIMETRY 1: CPT

## 2019-05-01 PROCEDURE — 74011000250 HC RX REV CODE- 250: Performed by: SURGERY

## 2019-05-01 PROCEDURE — 74011250637 HC RX REV CODE- 250/637: Performed by: SURGERY

## 2019-05-01 PROCEDURE — 74011250636 HC RX REV CODE- 250/636: Performed by: SURGERY

## 2019-05-01 PROCEDURE — 77010033678 HC OXYGEN DAILY

## 2019-05-01 PROCEDURE — 83735 ASSAY OF MAGNESIUM: CPT

## 2019-05-01 PROCEDURE — 80048 BASIC METABOLIC PNL TOTAL CA: CPT

## 2019-05-01 PROCEDURE — 81001 URINALYSIS AUTO W/SCOPE: CPT

## 2019-05-01 RX ORDER — ACETAMINOPHEN 325 MG/1
650 TABLET ORAL
Status: DISCONTINUED | OUTPATIENT
Start: 2019-05-01 | End: 2019-05-10 | Stop reason: HOSPADM

## 2019-05-01 RX ADMIN — Medication 10 ML: at 21:21

## 2019-05-01 RX ADMIN — PIPERACILLIN SODIUM,TAZOBACTAM SODIUM 4.5 G: 4; .5 INJECTION, POWDER, FOR SOLUTION INTRAVENOUS at 21:20

## 2019-05-01 RX ADMIN — PIPERACILLIN SODIUM,TAZOBACTAM SODIUM 4.5 G: 4; .5 INJECTION, POWDER, FOR SOLUTION INTRAVENOUS at 02:40

## 2019-05-01 RX ADMIN — PIPERACILLIN SODIUM,TAZOBACTAM SODIUM 4.5 G: 4; .5 INJECTION, POWDER, FOR SOLUTION INTRAVENOUS at 12:01

## 2019-05-01 RX ADMIN — Medication 10 ML: at 05:30

## 2019-05-01 RX ADMIN — Medication 10 ML: at 14:00

## 2019-05-01 RX ADMIN — ACETAMINOPHEN 650 MG: 325 TABLET ORAL at 04:28

## 2019-05-01 RX ADMIN — ENOXAPARIN SODIUM 40 MG: 40 INJECTION SUBCUTANEOUS at 08:28

## 2019-05-01 RX ADMIN — Medication 10 ML: at 05:31

## 2019-05-01 RX ADMIN — DEXTROSE MONOHYDRATE, SODIUM CHLORIDE, AND POTASSIUM CHLORIDE 125 ML/HR: 50; 4.5; 1.49 INJECTION, SOLUTION INTRAVENOUS at 19:42

## 2019-05-01 RX ADMIN — SODIUM CHLORIDE 40 MG: 9 INJECTION, SOLUTION INTRAMUSCULAR; INTRAVENOUS; SUBCUTANEOUS at 08:28

## 2019-05-01 RX ADMIN — FLUOXETINE HYDROCHLORIDE 20 MG: 20 SOLUTION ORAL at 08:28

## 2019-05-01 RX ADMIN — DEXTROSE MONOHYDRATE, SODIUM CHLORIDE, AND POTASSIUM CHLORIDE 125 ML/HR: 50; 4.5; 1.49 INJECTION, SOLUTION INTRAVENOUS at 08:25

## 2019-05-01 NOTE — PROGRESS NOTES
Problem: Mobility Impaired (Adult and Pediatric) Goal: *Acute Goals and Plan of Care (Insert Text) Description Physical Therapy Goals Initiated 5/1/2019 1. Patient will move from supine to sit and sit to supine , scoot up and down and roll side to side in bed with supervision/set-up within 7 day(s). 2.  Patient will transfer from bed to chair and chair to bed with minimal assistance/contact guard assist using the least restrictive device within 7 day(s). 3.  Patient will perform sit to stand with minimal assistance/contact guard assist within 7 day(s). 4.  Patient will ambulate with minimal assistance/contact guard assist for 50 feet with the least restrictive device within 7 day(s). Outcome: Progressing Towards Goal 
 PHYSICAL THERAPY EVALUATION Patient: Chetan Mars (23 y.o. female) Date: 5/1/2019 Primary Diagnosis: [facility] [E00.509] Procedure(s) (LRB): 
LAPAROSCOPY GENERAL DIAGNOSTIC LYSIS OF ADHESIONS (N/A) 1 Day Post-Op Precautions:   Fall ASSESSMENT : 
Based on the objective data described below, the patient presents with orthostatic hypotension, increased confusion, increased lethargy, delayed processing, impaired command following, increased weakness, impaired balance, impaired activity tolerance, and overall impaired functional mobility. Pt received supine in bed, oriented to self only, agreeable to participation with therapy. Unable to obtain accurate PLOF secondary to confusion and delayed processing/response. Pt assumed seated position EOB w/ modAx1, sitting balance intact however pt barely able to maintain eyes in opened position. Pt sit>>stand w/ minAx1 and sidestepped along EOB w/ minAx1 and HHAx1. Gait unsteady overall with pt exhibiting strong posterior lean in addition to trunk sway in all planes and mild buckling of BLEs.  Secondary to orthostatic hypotension and lethargic state, pt not safe to mobilize to bedside chair. Recommend continued x2 person assist during all OOB mobility/ambulation. Given functional mobility deficits demonstrated this date, recommend SNF at discharge. Patient Vitals for the past 4 hrs: 
 Temp Pulse Resp BP SpO2  
05/01/19 1134  94  117/55   
05/01/19 1132  (!) 109  (!) 85/46   
05/01/19 1126  100  99/60   
05/01/19 1120  96  109/47 Patient will benefit from skilled intervention to address the above impairments. Patient?s rehabilitation potential is considered to be Good Factors which may influence rehabilitation potential include:  
? None noted ? Mental ability/status ? Medical condition ? Home/family situation and support systems ? Safety awareness 
? Pain tolerance/management 
? Other: PLAN : 
Recommendations and Planned Interventions: 
?           Bed Mobility Training             ? Neuromuscular Re-Education ? Transfer Training                   ? Orthotic/Prosthetic Training 
? Gait Training                         ? Modalities ? Therapeutic Exercises           ? Edema Management/Control ? Therapeutic Activities            ? Patient and Family Training/Education ? Other (comment): Frequency/Duration: Patient will be followed by physical therapy  4 times a week to address goals. Discharge Recommendations: [facility] Further Equipment Recommendations for Discharge: tbd by facility SUBJECTIVE:  
Patient stated ? I'll be 11years old.? OBJECTIVE DATA SUMMARY:  
HISTORY:   
Past Medical History:  
Diagnosis Date Anxiety Arthritis Cancer ([facility] [address] BCC & SCC of face & leg Dysphagia 08/01/2014  
 pt denies any issues with dysphagia (food) at this time 2/15/17 GERD (gastroesophageal reflux disease) IBS (irritable bowel syndrome) Nausea & vomiting  Pneumonia age 8  
 PUD (peptic ulcer disease) Stroke [facility]) 2007  
 2 \"mini-byers\" -as of 2/1/5/17 pt denies any residual effect Unspecified adverse effect of anesthesia   
 sister-almost dies after surgeries. pt has never head of malignant hyperthermia and pt has never had difficulty during anesthesia Past Surgical History:  
Procedure Laterality Date COLONOSCOPY N/A 6/9/2016 COLONOSCOPY performed by Prem Mendes MD at [facility] ENDOSCOPY  
 HX BREAST BIOPSY Bilateral   
 benign HX HYSTERECTOMY HX LAP CHOLECYSTECTOMY HX SALPINGO-OOPHORECTOMY \"left one ovary\" HX SHOULDER ARTHROSCOPY Left HX TONSILLECTOMY HX TUBAL LIGATION    
 [place] COLONOSCOPY FLX DX W/COLLJ SPEC WHEN PFRMD  2/21/2011 Prior Level of Function/Home Situation: Pt reports living at home w/ her daughter (chart mentions adult day care?). Pt reports ambulation with use of device. Personal factors and/or comorbidities impacting plan of care:  
 
Home Situation Home Environment: Private residence # Steps to Enter: 6 One/Two Story Residence: One story Living Alone: No(daughter ) Support Systems: Child(cassandra) Patient Expects to be Discharged to[de-identified] Private residence Current DME Used/Available at Home: Grab bars Tub or Shower Type: Tub/Shower combination(unclear?) EXAMINATION/PRESENTATION/DECISION MAKING:  
Critical Behavior: 
Neurologic State: Confused Orientation Level: Oriented to person, Oriented to place, Oriented to time, Disoriented to situation Cognition: Impaired decision making, Decreased attention/concentration Hearing: Auditory Auditory Impairment: None Hearing Aids/Status: Does not own Skin:  intact Edema: none noted Range Of Motion: 
AROM: Generally decreased, functional 
  
  
  
  
  
  
  
Strength:   
Strength: Generally decreased, functional 
  
  
  
  
  
  
Tone & Sensation:  
Tone: Normal 
  
  
  
  
  
  
  
  
   
Coordination: 
Coordination: Generally decreased, functional 
Vision: Acuity: (DNT- questionable depth perception vs delayed processing) Corrective Lenses: Glasses Functional Mobility: 
Bed Mobility: 
  
Supine to Sit: Moderate assistance Sit to Supine: Moderate assistance;Assist x2 Transfers: 
Sit to Stand: Minimum assistance Stand to Sit: Minimum assistance Balance:  
Sitting: Intact Standing: Impaired Standing - Static: Constant support; Fair 
Standing - Dynamic : Poor Ambulation/Gait Training: 
  
  
  
  
  
  
  
  
  
  
  
  
  
  
  
  
  
  
Functional Measure: 
Barthel Index: 
Bathin Bladder: 0 Bowels: 5 Groomin Dressin Feedin Mobility: 0 Stairs: 0 Toilet Use: 5 Transfer (Bed to Chair and Back): 5 Total: 25/100 Percentage of impairment  
0% 1-19% 20-39% 40-59% 60-79% 80-99% 100% Barthel Score 0-100 100 99-80 79-60 59-40 20-39 1-19 
 0 The Barthel ADL Index: Guidelines 1. The index should be used as a record of what a patient does, not as a record of what a patient could do. 2. The main aim is to establish degree of independence from any help, physical or verbal, however minor and for whatever reason. 3. The need for supervision renders the patient not independent. 4. A patient's performance should be established using the best available evidence. Asking the patient, friends/relatives and nurses are the usual sources, but direct observation and common sense are also important. However direct testing is not needed. 5. Usually the patient's performance over the preceding 24-48 hours is important, but occasionally longer periods will be relevant. 6. Middle categories imply that the patient supplies over 50 per cent of the effort. 7. Use of aids to be independent is allowed. Catrachito Watkins., Barthel, D.W. (4695). Functional evaluation: the Barthel Index. [address] [facility] (14)2.  
Sandria Cogan carlin NAMAN Way, Kandi Myers, Nika Howard., Janes Denney. (1999). Measuring the change indisability after inpatient rehabilitation; comparison of the responsiveness of the Barthel Index and Functional Dale Measure. Journal of Neurology, Neurosurgery, and Psychiatry, 66(4), 960-227. QUITA Carnes, DREA Rivera, & Xavier Sims M.A. (2004.) Assessment of post-stroke quality of life in cost-effectiveness studies: The usefulness of the Barthel Index and the EuroQoL-5D. [facility], 13, 109-70 Physical Therapy Evaluation Charge Determination History Examination Presentation Decision-Making MEDIUM  Complexity : 1-2 comorbidities / personal factors will impact the outcome/ POC  MEDIUM Complexity : 3 Standardized tests and measures addressing body structure, function, activity limitation and / or participation in recreation  MEDIUM Complexity : Evolving with changing characteristics  MEDIUM Complexity : FOTO score of 26-74 Based on the above components, the patient evaluation is determined to be of the following complexity level: MEDIUM Pain: 
Pain Scale 1: Numeric (0 - 10) Pain Intensity 1: 0 Activity Tolerance:  
Orthostatic hypotension, resolved with supine rest 
Please refer to the flowsheet for vital signs taken during this treatment. After treatment:  
?         Patient left in no apparent distress sitting up in chair ? Patient left in no apparent distress in bed 
? Call bell left within reach ? Nursing notified ? Caregiver present ? Bed alarm activated COMMUNICATION/EDUCATION:  
The patient?s plan of care was discussed with: Registered Nurse. ?         Fall prevention education was provided and the patient/caregiver indicated understanding. ? Patient/family have participated as able in goal setting and plan of care. ?         Patient/family agree to work toward stated goals and plan of care. ?         Patient understands intent and goals of therapy, but is neutral about his/her participation. ? Patient is unable to participate in goal setting and plan of care. Thank you for this referral. 
Jono Barksdale, PT, DPT Time Calculation: 22 mins

## 2019-05-01 NOTE — CDMP QUERY
#2 
Pt admitted with SBO. Pt noted to have AMS. If possible, please document in the progress notes and d/c summary if you are evaluating and / or treating any of the following: ? Metabolic Encephalopathy ? Septic Encephalopathy ? Toxic Encephalopathy 
? Encephalopathy due to medications or drugs (please specify) ? Toxic Metabolic Encephalopathy 
? Other Encephalopathy 
? Other, please specify ? Clinically unable to determine The medical record reflects the following: 
   Risk Factors: SBO, s/p surgery Clinical Indicators: AMS PN 4/30 documented \"fever and mental status changes consistent with sepsis\" 5/1 documented \"less confused\" Treatment: cultures, cxr, diagnostic lap Thank You Afshan Watson, [address] [facility] 
   [phone]

## 2019-05-01 NOTE — PROGRESS NOTES
Speech path Pt failed her STAND due to esophageal dysphagia. Does not need speech follow up unless she has oropharyngeal dysphagia.   
Rissa Zheng, SLP

## 2019-05-01 NOTE — PROGRESS NOTES
Problem: Self Care Deficits Care Plan (Adult) Goal: *Acute Goals and Plan of Care (Insert Text) Description Occupational Therapy Goals Initiated 2019 1. Patient will perform grooming in sitting with supervision/set-up within 7 day(s). 2.  Patient will perform self-feeding with supervision/set-up within 7 day(s). 3.  Patient will perform upper body dressing with supervision/set-up within 7 day(s). 4.  Patient will perform toilet transfers to [facility] with moderate assistance  within 7 day(s). 5.  Patient will perform all aspects of toileting with moderate assistance  within 7 day(s). 6.  Patient will participate in [facility] within 7 day(s). Outcome: Progressing Towards Goal 
 OCCUPATIONAL THERAPY EVALUATION Patient: Roger Schmitt (89 y.o. female) Date: 2019 Primary Diagnosis: [facility] [L60.096] Procedure(s) (LRB): 
LAPAROSCOPY GENERAL DIAGNOSTIC LYSIS OF ADHESIONS (N/A) 1 Day Post-Op Precautions: Fall   , liquid diet ASSESSMENT : 
Based on the objective data described below, patient presents with Moderate Assistance upper body ADLs, Maximum assistance lower body ADLs, and Moderate Assistance functional mobility. Admitted  with abd pain with dx of SBO and laparoscopy POD 1. Nursing cleared for therapy. Oriented to self, place, and May 2019. Slow processing through out session needing additional time for command following and all functional mobility. VSS through out on 2L. Returned to bed secondary to drowsiness. Supine: 110/49, HR 99 Sittin/78,  Supine post act: 112/67, HR 94 She reports living at home with her daughter. PTA indep with ADL tasks and transfers. Unclear if she was driving. Recommend next session for self care transfer training. The following are barriers to ADL independence while in acute care:  
- Cognitive and/or behavioral: orientation, attention to task, initiation, safety awareness and insight into deficits - Medical condition: strength, functional endurance and standing balance   
- Other:    
 
Patient will benefit from skilled acute intervention to address the above impairments. Patients rehabilitation potential is considered to be Good Discharge recommendations: Rehab: patient is working towards tolerating 3 hours of therapy (to regain functional baseline patient requires rehab) If above is not an option then recommend: Rehab at skilled nursing facility (SNF) (to regain functional baseline patient requires rehab) vs Home with assist from dtr PRN Barriers to discharging home, in addition to above listed impairments: family availability to assist 
level of physical assist required to maintain patient safety. PLAN : 
Recommendations and Planned Interventions: self care training, functional mobility training, therapeutic exercise, balance training, therapeutic activities, endurance activities, patient education, home safety training and family training/education Frequency/Duration: Patient will be followed by occupational therapy 5 times a week to address goals. SUBJECTIVE:  
Patient stated I live in my daughter's room.  OBJECTIVE DATA SUMMARY:  
HISTORY:  
Past Medical History:  
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  
 pt denies any issues with dysphagia (food) at this time 2/15/17  GERD (gastroesophageal reflux disease)  IBS (irritable bowel syndrome)  Nausea & vomiting  Pneumonia age 8  PUD (peptic ulcer disease)  Stroke [facility]) 2007  
 2 \"mini-byers\" -as of 2/1/5/17 pt denies any residual effect  Unspecified adverse effect of anesthesia   
 sister-almost dies after surgeries. pt has never head of malignant hyperthermia and pt has never had difficulty during anesthesia Past Surgical History:  
Procedure Laterality Date  COLONOSCOPY N/A 6/9/2016 COLONOSCOPY performed by Alejandra Olvera MD at [facility] ENDOSCOPY  
 HX BREAST BIOPSY Bilateral   
 benign  HX HYSTERECTOMY  HX LAP CHOLECYSTECTOMY  HX SALPINGO-OOPHORECTOMY \"left one ovary\"  HX SHOULDER ARTHROSCOPY Left  HX TONSILLECTOMY  HX TUBAL LIGATION    
 ME COLONOSCOPY FLX DX W/COLLJ SPEC WHEN PFRMD  2/21/2011 Prior Level of Function/Environment/Context: Questionable historian. Reports home with dtr. Indep ADL tasks. Occupations in which the patient is/was successful, what are the barriers preventing that success:  
Performance Patterns (routines, roles, habits, and rituals):  
Personal Interests and/or values:  
Expanded or extensive additional review of patient history:  
 
Home Situation Home Environment: Independent living # Steps to Enter: 6 One/Two Story Residence: One story Living Alone: No 
Support Systems: Child(cassandra), Family member(s), Friends \ neighbors, Harle Patel / [facility] community Patient Expects to be Discharged to[de-identified] Private residence Current DME Used/Available at Home: Grab bars Tub or Shower Type: Tub/Shower combination(unclear?) Hand dominance: Right EXAMINATION OF PERFORMANCE DEFICITS: 
Cognitive/Behavioral Status: 
Neurologic State: Confused Orientation Level: Oriented to person;Oriented to place;Oriented to time;Disoriented to situation Cognition: Impaired decision making;Decreased attention/concentration Hearing: Auditory Auditory Impairment: None Hearing Aids/Status: Does not own Vision/Perceptual:   
    
    
    
  
    
Acuity: (DNT- questionable depth perception vs delayed processing) Corrective Lenses: Glasses Range of Motion: BUE mild impairment, functional 
 
Strength: BUE mild impairment, functional 
  
Coordination: 
  
Fine Motor Skills-Upper: Left Intact; Right Intact Gross Motor Skills-Upper: Left Intact; Right Intact Tone & Sensation: BUE WDL Balance: 
Sitting: Intact Standing: Impaired Standing - Static: Constant support; Fair 
Standing - Dynamic : Poor Functional Mobility and Transfers for ADLs: 
Bed Mobility: 
Supine to Sit: Moderate assistance Sit to Supine: Moderate assistance;Assist x2 Transfers: 
Sit to Stand: Moderate assistance Stand to Sit: Moderate assistance ADL Assessment: 
Feeding: Minimum assistance Oral Facial Hygiene/Grooming: Moderate assistance Bathing: Maximum assistance Upper Body Dressing: Moderate assistance Lower Body Dressing: Maximum assistance Toileting: Maximum assistance ADL Intervention and task modifications: 
  
Provided verbal and tactile cues for sequencing, safety and body mechanics for bed mob, sitting balance and sit <> stand with sidestepping with HHA. Slow processing through out session. Needing additional time. At [facility] demo ability to complete self feeding for drinking with min A and grooming to comb hair with mod A. Initial hand over hand for hair combing. Questionable depth perception with reaching for items unclear if vision related or secondary to drowsiness. Returned to bed. Grooming Brushing/Combing Hair: Moderate assistance(hand over hand) Functional Measure: 
Barthel Index: 
 
Bathin Bladder: 0 Bowels: 5 Groomin Dressin Feedin Mobility: 0 Stairs: 0 Toilet Use: 0 Transfer (Bed to Chair and Back): 5 Total: 15/100 Percentage of impairment  
0% 1-19% 20-39% 40-59% 60-79% 80-99% 100% Barthel Score 0-100 100 99-80 79-60 59-40 20-39 1-19 
 0 The Barthel ADL Index: Guidelines 1. The index should be used as a record of what a patient does, not as a record of what a patient could do. 2. The main aim is to establish degree of independence from any help, physical or verbal, however minor and for whatever reason. 3. The need for supervision renders the patient not independent. 4. A patient's performance should be established using the best available evidence. Asking the patient, friends/relatives and nurses are the usual sources, but direct observation and common sense are also important. However direct testing is not needed. 5. Usually the patient's performance over the preceding 24-48 hours is important, but occasionally longer periods will be relevant. 6. Middle categories imply that the patient supplies over 50 per cent of the effort. 7. Use of aids to be independent is allowed. Edward Nugent., Barthel, D.W. (8864). Functional evaluation: the Barthel Index. [address] [facility] (14)2. Hattie Anand carlin [address], Emi Castro., Gio Wyatt., [place], [address] [place] [address] (1999). Measuring the change indisability after inpatient rehabilitation; comparison of the responsiveness of the Barthel Index and Functional [place] Measure. Journal of Neurology, Neurosurgery, and Psychiatry, 66(4), 707-563. Luciana Moreira, [place].CLAUDIA, DREA Rivera, & Barrera Silver M.A. (2004.) Assessment of post-stroke quality of life in cost-effectiveness studies: The usefulness of the Barthel Index and the EuroQoL-5D. [facility], 13, 811-21 Occupational Therapy Evaluation Charge Determination History Examination Decision-Making LOW Complexity : Brief history review  LOW Complexity : 1-3 performance deficits relating to physical, cognitive , or psychosocial skils that result in activity limitations and / or participation restrictions  LOW Complexity : No comorbidities that affect functional and no verbal or physical assistance needed to complete eval tasks Based on the above components, the patient evaluation is determined to be of the following complexity level: LOW Pain: 
Patient denied pain prior to session and sitting EOB. With sit > supine she grimaced but unable to verbalize pain level or location. Grimacing terminated with rest.  
 
Activity Tolerance:  
Fair Please refer to the flowsheet for vital signs taken during this treatment. After treatment patient left:  
Supine in bed Call light within reach RN notified COMMUNICATION/EDUCATION:  
The patients plan of care was discussed with: Registered Nurse. Patient/family agree to work toward stated goals and plan of care. This patients plan of care is appropriate for delegation to ELINOR. Thank you for this referral. 
Elmira Johnson, OT Time Calculation: 25 mins

## 2019-05-01 NOTE — OP NOTES
[address]  OPERATIVE REPORT    Name:  Tony Meraz  MR#:  [id number]  :  1940  ACCOUNT #:  [de-identified]  DATE OF SERVICE:  2019      PREOPERATIVE DIAGNOSES:  1.  Small bowel obstruction. 2.  Fever. 3.  Tachycardia. 4.  Altered mental status. POSTOPERATIVE DIAGNOSES:  1.  Small bowel obstruction. 2.  Fever. 3.  Tachycardia. 4.  Altered mental status. PROCEDURE PERFORMED:  Laparoscopic lysis of adhesions. SURGEON:   Michael Olivas MD    ASSISTANT:  Lorne Clemens    ANESTHESIA:  General endotracheal.    COMPLICATIONS:  None apparent. SPECIMENS REMOVED:  None. IMPLANTS:  None. ESTIMATED BLOOD LOSS:  Less than 50 mL. TUBES AND DRAINS:  None. FINDINGS:  1. An omental band to the root of the small bowel mesentery in the mid jejunum causing a closed-loop bowel obstruction in the left upper quadrant. 2.  Entrapped bowel and its mesentery were edematous and inflamed, but appeared viable. At the completion of the case, the area of inflamed bowel demonstrated clear peristalsis. INDICATIONS FOR PROCEDURE:  The patient is a 44-year-old female, who came to the Emergency Department with complaints of left upper quadrant pain and nausea. Workup revealed a possible small bowel obstruction in the left upper quadrant. The patient was admitted to the surgical service and started on bowel rest; however, she had progressive nausea and vomiting followed by mental status changes and a fever, so the patient was taken to the operating room for exploration. DESCRIPTION OF PROCEDURE:  The patient was identified in the preoperative holding area. Informed consent obtained from her daughter. She was given preoperative antibiotics. She was then taken to the operating room, placed in a supine position, and underwent general endotracheal anesthesia without complication. A Feliz catheter was then inserted under sterile technique. An orogastric tube was then inserted.   The patient's right arm was then tucked and all pressure points padded. The patient's abdomen was then prepped and draped in the usual sterile fashion. A time-out was performed to confirm the patient by name and that she was presenting for laparoscopic exploration. Once this was confirmed, entry into the abdomen was obtained in the right upper quadrant at the tip of her 11th rib. At this site, 3 mL of 0.5% Marcaine were injected into the subcutaneous space. A 5-mm incision made and then a 5-mm XCEL trocar containing a 5-mm 0-degree laparoscope was used to dissect the layers of the abdominal wall under direct laparoscopic vision. Entry into the abdomen was confirmed visually. Once within the abdomen, insufflation was provided through this trocar to a pressure of 15 mmHg. The patient tolerated the insufflation well and there were no apparent complications. A 360-degree evaluation of the abdomen was then performed from this trocar site. There were no peritoneal implants. The liver appeared normal.  There were multiple dilated loops of small bowel in the left upper quadrant and decompressed loops down in the right lower quadrant. Attention was focused on the right lateral abdominal wall and two additional 5 mm trocars were placed under direct laparoscopic vision. A fourth 5 mm trocar was placed just above the umbilicus at the midline. Attention was then focused on the transverse colon. Attempts were made to reduce the omentum into the right upper quadrant, but there was a firm adhesive band down to the root of the mesentery. In attempting to isolate this band to divide it with a harmonic scalpel, the band broke allowing immediate reduction of the omentum into the right upper quadrant. Upon doing this, the dilated loops entrapped in the left upper quadrant and fell down into the lower abdomen. The transverse colon was retracted towards the anterior abdominal wall and the ligament of Treitz identified.   The small bowel was then run in a hand-over-hand fashion towards the mid to distal jejunum. The small bowel was noted to be very injected and inflamed and its mesentery was very thickened, but it did not appear frankly ischemic. The area involved was approximately 30 cm in length. Distal to this area, the bowel was completely decompressed and normal appearing. Proximal to this area, the bowel was pink, but was dilated and fluid-filled. The bowel was run all the way to the ileocecal valve. The bowel was then run in a retrograde direction back to the ligament of Treitz and once again there was a segment of inflamed, injected bowel, but at this point the bowel demonstrated signs of peristalsis and the bowel was run completely to the ligament of Treitz again. There were no serosal injuries identified and all the bowel appeared viable. The transverse colon was then allowed to fall back into its normal position. Visual inspection of the small bowel was then performed. The inflamed area was visualized and it demonstrated clear peristalsis indicating viable tissue. Therefore, at this point, insufflation was vented through the 5-mm trocars and they were removed. Skin at all trocar sites were closed with 4-0 Monocryl and Dermabond. The patient was extubated in the room and taken to the postanesthesia care unit in stable condition. Instrument and sponge counts were correct x2.         MD STEPHEN [id number]  D:  04/30/2019 18:48  T:  04/30/2019 18:54  JOB #:  [id number]

## 2019-05-01 NOTE — PROGRESS NOTES
General Surgery End of Shift Nursing Note Bedside shift change report given to Laura Dickey (oncoming nurse) . Report included the following information SBAR, Kardex, Procedure Summary, Intake/Output, MAR and Recent Results. Shift worked:   [id number] Summary of shift:    Tmax 100.8; tachyacrdic. MEWS 3. Tylenol order. Feliz cath draining well Issues for physician to address:     
 
Number times ambulated in hallway past shift:    
Number of times OOB to chair past shift:  
 
Pain Management: 
Current medication:  
Patient states pain is manageable on current pain medication: YES 
 
GI: 
 
Current diet:  DIET NPO With Ice Chips, Except Meds Tolerating current diet:  
Passing flatus: NO 
Last Bowel Movement:  
 Appearance:  
 
Respiratory: 
 
Incentive Spirometer at bedside: YES Patient instructed on use: YES Patient Safety: 
 
Falls Score: 2 Bed Alarm On? No 
Sitter?  No 
 
Gil Rawls RN

## 2019-05-01 NOTE — PROGRESS NOTES
03:20- MEWS 3. Patient Vitals for the past 4 hrs: 
 Temp Pulse Resp BP SpO2  
05/01/19 0311 (!) 100.8 °F (38.2 °C) (!) 115 16 109/46 96 % Paged Dr. Deloris Garsia for orders (requesting tylenol for fever). 03:50- Primary RN paged Dr. Deloris Garsia- second attempt. 04:06- Nursing supervisor, Rob Ayala, notified of need to get in hold of Dr. Deloris Garsia. Per supervisor, MD is in surgery and was given OR charge RN phone number to try to get in touch with MD. Attempted x2 without success. 04:10- Retried OR charge RN number and was able to speak to Dr. Deloris Garsia. Received telephone with read back orders for tylenol.

## 2019-05-01 NOTE — ROUTINE PROCESS
RAPID RESPONSE TEAM 
 
Rounded on patient due to rapid response yesterday morning. Patient had surgery yesterday. She is lying in bed, alert, still some confusion, VSS, NAD, C/O abd tenderness. Lactic acid normalized, Tmax overnight 100.9 (tylenol ordered and given), HR was in 110s, now in 90s. WBC decreased to 7.2. Urine sample was sent but QNS for for UA so reflex culture was not done. No RRT interventions currently indicated. Please call back if needed. Amy Garcia

## 2019-05-01 NOTE — PROGRESS NOTES
Reason for Admission:   Small bowl obstruction RRAT Score: 12 Plan for utilizing home health: Based on therapy (PT/OT) notes, pt will most likely utilize SNF services post d/c. Current Advanced Directive/Advance Care Plan: Pt indicated that her daughter would be her medical decision maker in the event that she is unable to make her own decisions. Likelihood of Readmission:  low Transition of Care Plan:       
 
CM met with pt and her daughter at bedside to conduct initial assessment. Pt was resting, appeared drowsy, and contributed minimally to the discussion. CM verified the majority of the pt's information with her daughter Eren Ly: [phone]), with the pt interjecting occasionally to add to what was already said. Pt lives with her daughter and son-in-law in a one level home with five steps leading to the front entrance and two steps leading to the back entrance. Pt's daughter indicated that the pt will be accessing the home through the back entrance in effort to utilize the most accessible option. Pt is active with her PCP and indicated that she has seen her within the last month. Pt's daughter stated that she had an appointment with her PCP scheduled for tomorrow (5/2/19), but had to cancel per the pt's current admission into [address] [facility]. Pt's daughter stated that the pt was completely independent with ADL's prior to admission and has access to DME including a walker, wheel chair, and bed-side commode. Pt's daughter stated that while the pt does not actively utilize DME, they have access to specific things due to past experiences with other family members. Pt doesn't have any barriers pertaining to her ability to access her medication.  
 
Pt's daughter confirmed that the pt hasn't utilized SNF or [place] [address] services in the past. CM discussed therapy (PT/OT) recommendations for pt to utilize SNF post d/c. CM provided pt and her daughter with a list of providers and discussed their preference in terms of which SNF they wanted to use. Pt and her daughter chose [facility] because it's close to their home. CM informed pt and her daughter of [address] document. Pt gave her daughter permission to sign FOC on her behalf. FOC was signed and placed in pt's chart. CM will continue to follow pt and assess d/c needs. Care Management Interventions PCP Verified by CM: Yes Transition of Care Consult (CM Consult): Discharge Planning Physical Therapy Consult: Yes Occupational Therapy Consult: Yes Speech Therapy Consult: No 
Current Support Network: Relative's Home(lives with daughter Nancy Cater: [phone]) and her daughter's ) Confirm Follow Up Transport: Self Plan discussed with Pt/Family/Caregiver: Yes Freedom of Choice Offered: Yes Discharge Location Discharge Placement: Skilled nursing facility [facility] [phone]

## 2019-05-01 NOTE — PROGRESS NOTES
General Surgery End of Shift Nursing Note Bedside shift change report given to Gianfranco Bledsoe (oncoming nurse) . Report included the following information SBAR, Kardex, Procedure Summary, Intake/Output, MAR and Recent Results. Shift worked:  7a to 7p Summary of shift:    Pt remained confused all day, her MEWS stayed at 2 or better all day. Issues for physician to address:   Is the confusing base line or is it something else? Number times ambulated in hallway past shift:    
Number of times OOB to chair past shift:  
 
Pain Management: 
Current medication: none Patient states pain is manageable on current pain medication: YES 
 
GI: 
 
Current diet:  DIET CLEAR LIQUID Tolerating current diet:  
Passing flatus: NO 
Last Bowel Movement:  
 Appearance:  
 
Respiratory: 
 
Incentive Spirometer at bedside: YES Patient instructed on use: YES Patient Safety: 
 
Falls Score: 2 Bed Alarm On? No 
Sitter?  No 
 
Sara Bishop RN

## 2019-05-01 NOTE — PROGRESS NOTES
SURGERY PROGRESS NOTE Admit Date: 2019 POD 1 Day Post-Op Procedure: Procedure(s): LAPAROSCOPY GENERAL DIAGNOSTIC LYSIS OF ADHESIONS Subjective:  
 
Patient denies pain and nausea. Less confused Objective:  
 
Visit Vitals /48 (BP 1 Location: Left arm, BP Patient Position: At rest) Pulse 99 Temp 99.1 °F (37.3 °C) Resp 18 Ht 5' 7\" (1.702 m) Wt 74.4 kg (164 lb) SpO2 97% BMI 25.69 kg/m² Temp (24hrs), Av.5 °F (37.5 °C), Min:98.2 °F (36.8 °C), Max:102.7 °F (39.3 °C) No intake/output data recorded.  1901 -  0700 In: 5432.8 [I.V.:5432.8] Out: 1500 [Urine:1100] Physical Exam:   
General:  alert, cooperative, no distress, appears stated age Abdomen: soft, bowel sounds active, non-tender Incision:   healing well, no drainage, no erythema, no hernia, no seroma, no swelling, no dehiscence, incision well approximated Lab Results Component Value Date/Time WBC 7.2 2019 02:31 AM  
 HGB 11.8 2019 02:31 AM  
 HCT 36.2 2019 02:31 AM  
 PLATELET 896 (L)  02:31 AM  
 MCV 90.5 2019 02:31 AM  
 
Lab Results Component Value Date/Time GFR est non-AA 38 (L) 2019 02:31 AM  
 GFR est AA 46 (L) 2019 02:31 AM  
 Creatinine 1.34 (H) 2019 02:31 AM  
 BUN 20 2019 02:31 AM  
 Sodium 135 (L) 2019 02:31 AM  
 Potassium 4.7 2019 02:31 AM  
 Chloride 107 2019 02:31 AM  
 CO2 21 2019 02:31 AM  
 Magnesium 1.6 2019 02:31 AM  
 Phosphorus 3.0 2019 02:44 AM  
 
 
Assessment:  
 
Active Problems: 
  SBO (small bowel obstruction) ([address] (2019) POD#1 PHILL Plan:  
 
 
Diet  clear

## 2019-05-01 NOTE — CDMP QUERY
#1 
 
Patient admitted with SBO. Documentation reflects  Sepsis in note dated 4/30. If possible, please specify in the progress notes and d/c summary if Sepsis was: 
 
=> Ruled out after study 
=> Still Suspected after study 
=> Confirmed after study 
=> SIRS d/t non infectious process The medical record reflects the following: 
  Risk Factors: SBO Clinical Indicators: N/V, AMS, tachycardia(104), fever(102.7) WBC 19.8  PN 4/30 documented \"fever and mental status changes consistent with sepsis\"   
  
 Treatment: cultures, cxr, diagnostic laparoscopy Thank You Jennifer Snow, [facility] 
   [phone]

## 2019-05-01 NOTE — CDMP QUERY
#3 
Patient admitted with SBO noted to have Crt 0.96, 1.71. If possible, please document in progress notes and d/c summary if you are evaluating and/or treating any of the following: 
 
=> Acute Kidney Injury 
=> Acute kidney insufficiency  
=> Other Explanation of clinical findings 
=> Clinically Unable to determine (no explanation for clinical findings) The medical record reflects the following: 
   Risk Factors: SBO Clinical Indicators: s/p surgery, Crt 0.96, 0.93, 1.71, 1.34, GFR 56,58, 29, 38 Treatment: iv fluids, lab monitoring RIFLE CRITERIA 
 
CATEGORY SERUM CREATININE  
CRITERIA GFR CRITERIA URINE OUTPUT CRITERIA Risk Increased x 1.5 GFR decreased > 25% < 0.5mL/kg/hr  
x 6 hrs High Sensitivity Injury Increased X 2 GFR decrease > 50% < 0.5mL/kg/hr  
     x 12 hrs Failure Increased x 3, or sCr > 4mg/dL & acute rise > 0.5 mg/dL w/in 48 hrs GFR decrease > 75% < 0.3mL/kg/hr  
x 24 hrs, or  
anuria x 12 hrs High Specificity Loss Perisistant ARF =  complete loss of kiney function > 4 weeks ESRD End Stage Renal Disease (> 3 months) Thank You Luis Eduardo Ku, [address] [facility] 
   [phone]

## 2019-05-01 NOTE — PROGRESS NOTES
RAPID RESPONSE TEAM- Follow Up Rounded on patient due to recent rapid response (4/30/19). Discussed with primary RN, Zenia Saldivar. No acute concerns, VSS, MEWS 1. Repeat lactic acid down to 1.5. Patient is alert in bed, no complaints at this time. No RRT interventions indicated at this time. Please call with any questions or concerns. Kelly Pacheco Rapid Response RN Yocasta Scott

## 2019-05-02 ENCOUNTER — APPOINTMENT (OUTPATIENT)
Dept: CT IMAGING | Age: 79
DRG: 335 | End: 2019-05-02
Attending: INTERNAL MEDICINE
Payer: MEDICARE

## 2019-05-02 LAB
ANION GAP SERPL CALC-SCNC: 7 MMOL/L (ref 5–15)
BUN SERPL-MCNC: 14 MG/DL (ref 6–20)
BUN/CREAT SERPL: 16 (ref 12–20)
CALCIUM SERPL-MCNC: 8.1 MG/DL (ref 8.5–10.1)
CHLORIDE SERPL-SCNC: 101 MMOL/L (ref 97–108)
CO2 SERPL-SCNC: 22 MMOL/L (ref 21–32)
CREAT SERPL-MCNC: 0.9 MG/DL (ref 0.55–1.02)
ERYTHROCYTE [DISTWIDTH] IN BLOOD BY AUTOMATED COUNT: 13.6 % (ref 11.5–14.5)
GLUCOSE SERPL-MCNC: 173 MG/DL (ref 65–100)
HCT VFR BLD AUTO: 36.4 % (ref 35–47)
HGB BLD-MCNC: 12.1 G/DL (ref 11.5–16)
MAGNESIUM SERPL-MCNC: 1.8 MG/DL (ref 1.6–2.4)
MCH RBC QN AUTO: 29.8 PG (ref 26–34)
MCHC RBC AUTO-ENTMCNC: 33.2 G/DL (ref 30–36.5)
MCV RBC AUTO: 89.7 FL (ref 80–99)
NRBC # BLD: 0 K/UL (ref 0–0.01)
NRBC BLD-RTO: 0 PER 100 WBC
PLATELET # BLD AUTO: 125 K/UL (ref 150–400)
PMV BLD AUTO: 11.6 FL (ref 8.9–12.9)
POTASSIUM SERPL-SCNC: 4 MMOL/L (ref 3.5–5.1)
RBC # BLD AUTO: 4.06 M/UL (ref 3.8–5.2)
SODIUM SERPL-SCNC: 130 MMOL/L (ref 136–145)
WBC # BLD AUTO: 10.5 K/UL (ref 3.6–11)

## 2019-05-02 PROCEDURE — 80048 BASIC METABOLIC PNL TOTAL CA: CPT

## 2019-05-02 PROCEDURE — 36415 COLL VENOUS BLD VENIPUNCTURE: CPT

## 2019-05-02 PROCEDURE — 74011250637 HC RX REV CODE- 250/637: Performed by: SURGERY

## 2019-05-02 PROCEDURE — 97530 THERAPEUTIC ACTIVITIES: CPT | Performed by: OCCUPATIONAL THERAPIST

## 2019-05-02 PROCEDURE — 95816 EEG AWAKE AND DROWSY: CPT | Performed by: INTERNAL MEDICINE

## 2019-05-02 PROCEDURE — 94760 N-INVAS EAR/PLS OXIMETRY 1: CPT

## 2019-05-02 PROCEDURE — 93005 ELECTROCARDIOGRAM TRACING: CPT

## 2019-05-02 PROCEDURE — 65660000000 HC RM CCU STEPDOWN

## 2019-05-02 PROCEDURE — 97535 SELF CARE MNGMENT TRAINING: CPT | Performed by: OCCUPATIONAL THERAPIST

## 2019-05-02 PROCEDURE — 77010033678 HC OXYGEN DAILY

## 2019-05-02 PROCEDURE — 70450 CT HEAD/BRAIN W/O DYE: CPT

## 2019-05-02 PROCEDURE — 74011000258 HC RX REV CODE- 258: Performed by: SURGERY

## 2019-05-02 PROCEDURE — C9113 INJ PANTOPRAZOLE SODIUM, VIA: HCPCS | Performed by: SURGERY

## 2019-05-02 PROCEDURE — 97530 THERAPEUTIC ACTIVITIES: CPT | Performed by: PHYSICAL THERAPIST

## 2019-05-02 PROCEDURE — 85027 COMPLETE CBC AUTOMATED: CPT

## 2019-05-02 PROCEDURE — 83735 ASSAY OF MAGNESIUM: CPT

## 2019-05-02 PROCEDURE — 97116 GAIT TRAINING THERAPY: CPT | Performed by: PHYSICAL THERAPIST

## 2019-05-02 PROCEDURE — 74011250637 HC RX REV CODE- 250/637: Performed by: INTERNAL MEDICINE

## 2019-05-02 PROCEDURE — 74011000250 HC RX REV CODE- 250: Performed by: SURGERY

## 2019-05-02 PROCEDURE — 74011250636 HC RX REV CODE- 250/636: Performed by: SURGERY

## 2019-05-02 RX ORDER — ALPRAZOLAM 0.5 MG/1
0.5 TABLET ORAL
Status: DISCONTINUED | OUTPATIENT
Start: 2019-05-02 | End: 2019-05-09 | Stop reason: SINTOL

## 2019-05-02 RX ORDER — QUETIAPINE FUMARATE 25 MG/1
25 TABLET, FILM COATED ORAL
Status: DISCONTINUED | OUTPATIENT
Start: 2019-05-02 | End: 2019-05-09 | Stop reason: SINTOL

## 2019-05-02 RX ADMIN — Medication 10 ML: at 21:09

## 2019-05-02 RX ADMIN — Medication 10 ML: at 19:47

## 2019-05-02 RX ADMIN — PIPERACILLIN SODIUM,TAZOBACTAM SODIUM 4.5 G: 4; .5 INJECTION, POWDER, FOR SOLUTION INTRAVENOUS at 12:18

## 2019-05-02 RX ADMIN — FLUOXETINE HYDROCHLORIDE 20 MG: 20 SOLUTION ORAL at 10:38

## 2019-05-02 RX ADMIN — DEXTROSE MONOHYDRATE, SODIUM CHLORIDE, AND POTASSIUM CHLORIDE 125 ML/HR: 50; 4.5; 1.49 INJECTION, SOLUTION INTRAVENOUS at 15:03

## 2019-05-02 RX ADMIN — Medication 10 ML: at 06:49

## 2019-05-02 RX ADMIN — QUETIAPINE FUMARATE 25 MG: 25 TABLET ORAL at 21:11

## 2019-05-02 RX ADMIN — PIPERACILLIN SODIUM,TAZOBACTAM SODIUM 4.5 G: 4; .5 INJECTION, POWDER, FOR SOLUTION INTRAVENOUS at 03:13

## 2019-05-02 RX ADMIN — ENOXAPARIN SODIUM 40 MG: 40 INJECTION SUBCUTANEOUS at 10:38

## 2019-05-02 RX ADMIN — SODIUM CHLORIDE 40 MG: 9 INJECTION, SOLUTION INTRAMUSCULAR; INTRAVENOUS; SUBCUTANEOUS at 10:38

## 2019-05-02 NOTE — PROGRESS NOTES
Problem: Mobility Impaired (Adult and Pediatric) Goal: *Acute Goals and Plan of Care (Insert Text) Description Physical Therapy Goals Initiated 5/1/2019 1. Patient will move from supine to sit and sit to supine , scoot up and down and roll side to side in bed with supervision/set-up within 7 day(s). 2.  Patient will transfer from bed to chair and chair to bed with minimal assistance/contact guard assist using the least restrictive device within 7 day(s). 3.  Patient will perform sit to stand with minimal assistance/contact guard assist within 7 day(s). 4.  Patient will ambulate with minimal assistance/contact guard assist for 50 feet with the least restrictive device within 7 day(s). Note: PHYSICAL THERAPY TREATMENT Patient: Rupesh Kunz (70 y.o. female) Date: 5/2/2019 Diagnosis: [facility] [K56.609] <principal problem not specified> Procedure(s) (LRB): 
LAPAROSCOPY GENERAL DIAGNOSTIC LYSIS OF ADHESIONS (N/A) 2 Days Post-Op Precautions: Fall Chart, physical therapy assessment, plan of care and goals were reviewed. ASSESSMENT: 
Patient with some improvement in balance and mobility. Patient somewhat confused throughout session. Patient supine in bed upon arrival but agreeable to therapy. Bed mobility with mod assist x 1. Patient with posterior lean upon sitting but does improve with time. BP in sitting 124/62. Sit to stand with min assist x 1 with posterior lean upon standing requiring assistance to maintain balance. BP in standing 93/63 with no reports of orthostatic symptoms. Patient ambulated 24' with rolling walker and min assist x1 with additional assistance for equipment. Patient with small, shuffling steps and path deviations. Up in chair at end of session with bed alarm pad in place; RN to get box. /57 in chair. Recommend SNF at discharge. Progression toward goals: 
?    Improving appropriately and progressing toward goals ?    Improving slowly and progressing toward goals ? Not making progress toward goals and plan of care will be adjusted PLAN: 
Patient continues to benefit from skilled intervention to address the above impairments. Continue treatment per established plan of care. Discharge Recommendations:  Cain Prescott Further Equipment Recommendations for Discharge:  TBD SUBJECTIVE:  
Patient stated ? When is my surgery?? OBJECTIVE DATA SUMMARY:  
Critical Behavior: 
Neurologic State: Confused, Alert Orientation Level: Disoriented to time, Disoriented to situation, Oriented to person, Oriented to place Cognition: Decreased attention/concentration, Follows commands, Impaired decision making, Poor safety awareness Safety/Judgement: Awareness of environment, Decreased insight into deficits, Decreased awareness of need for safety, Decreased awareness of need for assistance Functional Mobility Training: 
Bed Mobility: 
  
Supine to Sit: Moderate assistance;Assist x1;Additional time Transfers: 
Sit to Stand: Minimum assistance; Additional time Stand to Sit: Minimum assistance; Additional time Balance: 
Sitting: Impaired Sitting - Static: Fair (occasional) Sitting - Dynamic: Fair (occasional) Standing: Impaired Standing - Static: Constant support; Fair 
Standing - Dynamic : Fair;Constant support Ambulation/Gait Training: 
Distance (ft): 24 Feet (ft) Assistive Device: Gait belt;Walker, rolling Ambulation - Level of Assistance: Minimal assistance;Assist x1 Gait Abnormalities: Shuffling gait; Decreased step clearance Speed/Xochilt: Shuffled; Slow Step Length: Left shortened;Right shortened Pain: 
Pain Scale 1: Visual 
Pain Intensity 1: 0 Activity Tolerance:  
fair Please refer to the flowsheet for vital signs taken during this treatment. After treatment: ?    Patient left in no apparent distress sitting up in chair ? Patient left in no apparent distress in bed 
? Call bell left within reach ? Nursing notified ? Caregiver present ? Bed alarm activated COMMUNICATION/COLLABORATION:  
The patient?s plan of care was discussed with: Occupational Therapist and Registered Nurse Leanora Bosworth, PT Time Calculation: 33 mins

## 2019-05-02 NOTE — PROGRESS NOTES
Accidental Ingestion: Nontoxic (Adult)  You have been evaluated and treated for accidentally taking too much of a medicine or swallowing a chemical product. There is no sign of toxic effect at this time. It is not likely that any new symptoms will appear. To be safe, watch for symptoms during the next 24 hours (see below). The symptom will depend on what was swallowed.  Home care    If liquid charcoal was given to neutralize what was swallowed, it will give a black color to the stools for 1 to 2 days. Usually, a laxative is given with charcoal. This speeds the removal of any toxins from the intestines. This may cause diarrhea for up to 24 hours.    If you have been given charcoal but no laxative, you may become constipated. If this occurs, you may take an over-the-counter laxative.  Prevention    Keep medicines, pesticides, and other household chemicals in their original containers.    Clearly radha all harmful products if a different bottle is used.  Note: In the future, if you or someone you know takes something possibly harmful, and you are not sure what to do, call the [employer] of Poison Control Centers. The phone number is 1-570.872.9011. The phone line is staffed 24 hours a day. If you call, you will be connected to the poison control center closest to you.   Follow-up care  Follow up with your health care provider, or as advised.  Call [phone]  Contact your local emergency services right away if any of these occur.    Trouble breathing or swallowing, wheezing    Severe confusion    Extreme drowsiness or trouble awakening    Fainting or loss of consciousness    Rapid heart rate     Very slow heart rate    Very low or very high blood pressure    Vomiting blood, or large amounts of blood in stool    Seizure  When to seek medical advice  Call your health care provider right away if any of these occur.    Shakiness    Fast breathing (over 25 breaths per minute) or slow breathing (less than 8 breaths per  SURGERY PROGRESS NOTE Admit Date: 2019 POD 2 Days Post-Op Procedure: Procedure(s): LAPAROSCOPY GENERAL DIAGNOSTIC LYSIS OF ADHESIONS Subjective:  
 
Patient denies pain and nausea but she is confused Objective:  
 
Visit Vitals /76 Pulse (!) 104 Temp 97.6 °F (36.4 °C) Resp 18 Ht 5' 7\" (1.702 m) Wt 77.4 kg (170 lb 10.2 oz) SpO2 92% BMI 26.73 kg/m² Temp (24hrs), Av.7 °F (37.1 °C), Min:97.6 °F (36.4 °C), Max:99.8 °F (37.7 °C) No intake/output data recorded.  1901 -  0700 In: 3401.6 [P.O.:100; I.V.:3301.6] Out: 2650 [Urine:2650] Physical Exam:   
General:  alert, cooperative, no distress, appears stated age Abdomen: soft, bowel sounds active, non-tender Incision:   healing well, no drainage, no erythema, no hernia, no seroma, no swelling, no dehiscence, incision well approximated Lab Results Component Value Date/Time WBC 10.5 2019 03:22 AM  
 HGB 12.1 2019 03:22 AM  
 HCT 36.4 2019 03:22 AM  
 PLATELET 847 (L)  03:22 AM  
 MCV 89.7 2019 03:22 AM  
 
Lab Results Component Value Date/Time GFR est non-AA >60 2019 03:22 AM  
 GFR est AA >60 2019 03:22 AM  
 Creatinine 0.90 2019 03:22 AM  
 BUN 14 2019 03:22 AM  
 Sodium 130 (L) 2019 03:22 AM  
 Potassium 4.0 2019 03:22 AM  
 Chloride 101 2019 03:22 AM  
 CO2 22 2019 03:22 AM  
 Magnesium 1.8 2019 03:22 AM  
 Phosphorus 3.0 2019 02:44 AM  
 
 
Assessment:  
 
Active Problems: 
  SBO (small bowel obstruction) ([facility] (2019) bowel function appears to be returning. Unclear why still confused. Plan:  
 
 
Consult hospitalist for confusion minute)    Shortness of breath    Fever of 100.4 F (38 C) or higher, or as directed by your healthcare provider    Vomiting or diarrhea for more than 24 hours    Abdominal pain    Dizziness or weakness    [id number] [employer]. [address] [place] [address], [place] [address]. All rights reserved. This information is not intended as a substitute for professional medical care. Always follow your healthcare professional's instructions.

## 2019-05-02 NOTE — PROGRESS NOTES
1220 Patient daughter here. This RN asked daughter if patient baseline is confusion. Patient is oriented to self only. Daughter states \"Mom is better today than yesterday. Yesterday she was really confused. Mom is usually not confused. \" 
 
Patient confusion and agitation became more pronounced in the later afternoon. Patient pulled out IV and was standing up next to the chair. This RN called to have a tele sitter for patient. General Surgery End of Shift Nursing Note Bedside shift change report given to Jossie Cummings (oncoming nurse) by Gianna Chamorro (offgoing nurse). Report included the following information SBAR. Shift worked:   7a-7p Summary of shift:    See above Issues for physician to address:   See above Number times ambulated in hallway past shift: 0 Number of times OOB to chair past shift: 1 Pain Management: 
Current medication: see mar Patient states pain is manageable on current pain medication: YES 
 
GI: 
 
Current diet:  DIET CLEAR LIQUID Tolerating current diet: YES Passing flatus: NO 
Last Bowel Movement: several days ago Appearance: Camilo Henriquez Respiratory: 
 
Incentive Spirometer at bedside: YES Patient instructed on use: YES Patient Safety: 
 
Falls Score: 5 Bed Alarm On? Yes Sitter? Yes 
 
Abdulaziz Klein

## 2019-05-02 NOTE — PROGRESS NOTES
Problem: Self Care Deficits Care Plan (Adult) Goal: *Acute Goals and Plan of Care (Insert Text) Description Occupational Therapy Goals Initiated 5/1/2019 1. Patient will perform grooming in sitting with supervision/set-up within 7 day(s). 2.  Patient will perform self-feeding with supervision/set-up within 7 day(s). 3.  Patient will perform upper body dressing with supervision/set-up within 7 day(s). 4.  Patient will perform toilet transfers to [facility] with moderate assistance  within 7 day(s). 5.  Patient will perform all aspects of toileting with moderate assistance  within 7 day(s). 6.  Patient will participate in [facility] within 7 day(s). OCCUPATIONAL THERAPY TREATMENT Patient: Ajay Michaels (67 y.o. female) Date: 5/2/2019 Diagnosis: SBO (small bowel obstruction) ([facility] [K56.609] <principal problem not specified> Procedure(s) (LRB): 
LAPAROSCOPY GENERAL DIAGNOSTIC LYSIS OF ADHESIONS (N/A) 2 Days Post-Op Precautions: Fall Chart, occupational therapy assessment, plan of care, and goals were reviewed. ASSESSMENT: 
Patient continues to be limited by cognition during all tasks. She requires constant cues throughout functional tasks. Cognitively, poor memory, delayed processing, decreased safety, and decreased awareness of deficits are impacting her independence. Educated patient on how to use the call bell to access assistance if needed, but she was unable to remember at the end of the session. Notified nursing. Patient will continue to benefit from skilled OT with rehab recommended at discharge. Progression toward goals: 
?       Improving appropriately and progressing toward goals ? Improving slowly and progressing toward goals ? Not making progress toward goals and plan of care will be adjusted PLAN: 
Patient continues to benefit from skilled intervention to address the above impairments. Continue treatment per established plan of care. Discharge Recommendations:  Rehab Further Equipment Recommendations for Discharge:  Defer to facility SUBJECTIVE:  
Patient stated ? Okay. ? (flat affect with minimal verbal responses) OBJECTIVE DATA SUMMARY:  
Cognitive/Behavioral Status: 
Neurologic State: Confused; Alert Orientation Level: Disoriented to time;Disoriented to situation;Oriented to person;Oriented to place Cognition: Decreased attention/concentration; Follows commands; Impaired decision making;Poor safety awareness Perception: Cues to maintain midline in sitting;Cues to maintain midline in standing Perseveration: No perseveration noted Safety/Judgement: Awareness of environment;Decreased insight into deficits; Decreased awareness of need for safety;Decreased awareness of need for assistance Functional Mobility and Transfers for ADLs: 
Bed Mobility: 
Supine to Sit: Moderate assistance;Assist x1;Additional time Transfers: 
Sit to Stand: Minimum assistance; Additional time;Assist x1 Balance: 
Sitting: Impaired Sitting - Static: Fair (occasional) Sitting - Dynamic: Fair (occasional) Standing: Impaired Standing - Static: Constant support; Fair 
Standing - Dynamic : Fair ADL Intervention: 
Feeding Food to Mouth: Minimum assistance(ice chips; some spills; verbal cues) Grooming Washing Face: Minimum assistance;Proximal stability(seated edge of bed) Brushing/Combing Hair: Minimum assistance;Proximal stability(seated edge of bed) Cues: Verbal cues provided;Visual cues provided; Tactile cues provided Lower Body Dressing Assistance Socks: Total assistance (dependent) Cognitive Retraining Orientation Retraining: Time;Situation Problem Solving: Identifying the problem Organizing/Sequencing: Breaking task down Safety/Judgement: Awareness of environment;Decreased insight into deficits; Decreased awareness of need for safety;Decreased awareness of need for assistance Cues: Verbal cues provided;Visual cues provided; Tactile cues provided Pain: 
Pain Scale 1: Visual 
Pain Intensity 1: 0 Activity Tolerance:  
Fair Please refer to the flowsheet for vital signs taken during this treatment. After treatment:  
? Patient left in no apparent distress sitting up in chair ? Patient left in no apparent distress in bed 
? Call bell left within reach ? Nursing notified ? Caregiver present ? Bed alarm activated (Patient placed on alarm pad in chair. Discussed with nursing in case they want to utilize) COMMUNICATION/COLLABORATION:  
The patient?s plan of care was discussed with: Physical Therapist and Registered Nurse SHIKHA Wayne/L Time Calculation: 29 mins

## 2019-05-02 NOTE — CONSULTS
,                                                                                    Hospitalist Consultation Note    NAME:  Kaz Sanchez   :   1940   MRN:   [id number]     ATTENDING: Dallas Miranda MD  PCP:  Celina Ruth MD    Date/Time:  2019 3:19 PM      Recommendations/Plan:       Acute metabolic Encephalopathy    etiology unclear likely multifactorial, patient on multiple medications, will discontinue Protonix, zosyn, monitor ,iv fluids  Start patient on Seroquel low dose 25 mg at bedtime ,reduce external stimulants, follow up with EEG report repeat labs It does not appear to be infection related could have been viral too, cultures so far negative,i will stop abx, She was also on xanax as outpatient for possible anxiety this was stopped and put on prn ativan, I will put her back on that and add Seroquel as above, CT brain , Electrolytes, cultures, urine , xray unremarkable     Fever resolved likely viral patient does not look toxic will continue to monitor vitals, if she spikes will re culture. JOSH likely volume depletion dehydration resolved s/p iv fluids encourage oral intake     Mild hyponatremia likely secondary to fluids patient on D5W, will stop that and   encourage oral intake,     Anxiety ws on xanax will resume  Prozac     SBO s/p explorative laprascopy  With lysis of adhesions,    DVT continue lovenox    I called and discussed recommendations with Dr Deanne Pena     I will like to thank Dr Deanne Pena for allowing me to participate in the management of this patient .                  Code Status: Full code   DVT Prophylaxis: lovenox SC     Active Problems    :  Acute metabolic encephalopathy{delirium) multifactorial will make changes as indicated in my recommendations below ,also surgical anesthesia could be compounding in this case so is just watching and see for next few days, Even though no lateralization of symptoms I am still going to obtain CT brain, If it continues and fever recurs will consider LP for further analysis ,    JOSH likely from dehydration s/p IV fluids resolved, monitor renal function    SBO s/p Explorative laparoscopy with lysis of adhesions,improved no pain , not receing pain medications and tolerating oral for now ,    Leukocytosis likely reactive resolved,    SIRS s/p fluids, cultures negative, s/p zosyn , will discontinue antibiotics with cultures negative, stop fluids, encourage oral intake,continue to monitor patient,      Mild hyponatremia likely from IV fluids D5W will stop fluids encourage oral intake which is better,repeat labs in the morning,     Anxiety and depression continue xanax, Prozac, Seroquel at bedtime     Esophageal motility disorder will continue  as outpatient multiple work up done as outpatient ,    DVT Px on Lovenox    GI not indicated and PPI have also been documented to cause confusion in Elderly so will       Subjective:   REQUESTING PHYSICIAN:Lazaro bautista     REASON FOR CONSULT:confusion    HISTORY OF PRESENT ILLNESS:       Ms. Jessica Rodrigez is a 66 y. o.  female who is admitted to the general surgery  Service with abdominal pain   We are asked to evaluate for confusion . Ms. Schilling is confused appear delirious. she initially presented to the hospital with abdominal pain nausea and admitted with small bowel obstruction on Sunday April 30.on Tuesday she was noted to be confused and also developed fever with elevated white counts, She was seen by rapid response team started on Zosyn by surgery, IV fluids, cardiac monitor, she subsequently was taken to surgery on Tuesday for explorative laparoscopy, had lysis of adhesions. She has been doing well on liquid diet fever resolved appear well hydrated , her creatinine was elevated but normalized with fluids,   Despite surgical intervention she still remained confused, delirium , When I saw her she was awake, mumbling  Symptoms appear to be consistent with delirium she is able to verbalize but not clear . Since last much she has been having this symptoms where she continue to clear her throat, it appears to be post nasal.as per daughter she has been to multiple specialities pulmonology, allergist with no improvement, she was put on xanax and it improved slightly,When I was in the room she continued to clear her throat ,She denies any other complaints but continued to be  Rosalind ,daughter stated that she is completely different from baseline, she is apparently very functional at home,No pain, no nausea, no vomiting tolerating liquid diet ,   White counts remain normal, renal function normal, At this point because of the confusion which most likely from multiple causes, will obtain CT brain with no contrast , start seroquel, EEG, stop protonix stop antibiotics. 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  pt has never head of malignant hyperthermia and pt has never had difficulty during anesthesia      Past Surgical History:   Procedure Laterality Date    COLONOSCOPY N/A 6/9/2016    COLONOSCOPY performed by Amanda [facility]unguia [facility] at [facility] ENDOSCOPY    HX BREAST BIOPSY Bilateral     benign    HX HYSTERECTOMY      HX LAP CHOLECYSTECTOMY      HX SALPINGO-OOPHORECTOMY      \"left one ovary\"    HX SHOULDER ARTHROSCOPY Left     HX TONSILLECTOMY      HX TUBAL LIGATION      SC COLONOSCOPY FLX DX W/COLLJ SPEC WHEN PFRMD  2/21/2011          Social History     Tobacco Use    Smoking status: Never Smoker    Smokeless tobacco: Never Used Substance Use Topics    Alcohol use: No      History reviewed. No pertinent family history. Allergies   Allergen Reactions    Demerol [Meperidine] Nausea and Vomiting    Codeine Not Reported This Time    Penicillins Rash and Itching     Has tolerated zosyn      Prior to Admission medications    Medication Sig Start Date End Date Taking? Authorizing Provider   clemastine 2.68 mg tab tablet Take 1.34 mg by mouth daily as needed (Allergies). Yes Provider, Historical   raNITIdine (ZANTAC) 150 mg tablet Take 150 mg by mouth daily as needed for Indigestion (acid reflux). Yes Provider, Historical   polysorbate 80/glycerin (REFRESH DRY EYE THERAPY OP) Apply 2 Drops to eye daily. Yes Provider, Historical   vitamin E (AQUA GEMS) 400 unit capsule Take 400 Units by mouth daily. Yes Provider, Historical   ALPRAZolam (XANAX) 0.5 mg tablet Take 0.5 mg by mouth two (2) times a day. Yes Provider, Historical   meclizine (ANTIVERT) 25 mg tablet Take 25 mg by mouth three (3) times daily as needed for Dizziness (vertigo). Indications: sensation of spinning or whirling   Yes Provider, Historical   vit C/E/Zn/coppr/lutein/zeaxan (PRESERVISION AREDS 2 PO) Take 40 mg by mouth daily. Indications: eye lubrication   Yes Provider, Historical   fluoxetine (PROZAC) 40 mg capsule Take 40 mg by mouth every morning. 2/18/11  Yes Provider, Historical   aspirin 81 mg tablet Take 81 mg by mouth every morning. 2/18/11  Yes Provider, Historical   CALCIUM CARBONATE/VITAMIN D3 (CALTRATE-600 PLUS VITAMIN D3 PO) Take 1 Tab by mouth every morning. 2/18/11  Yes Provider, Historical       REVIEW OF SYSTEMS:     Total of 12 systems reviewed as follows:   I am not able to complete the review of systems because:    The patient is intubated and sedated   x The patient has altered mental status due to his acute medical problems    The patient has baseline aphasia from prior stroke(s)    The patient has baseline dementia and is not reliable historian POSITIVE= underlined text  Negative = text not underlined  General:  fever, chills, sweats, generalized weakness, weight loss/gain,      loss of appetite   Eyes:    blurred vision, eye pain, loss of vision, double vision  ENT:    rhinorrhea, pharyngitis   Respiratory:   cough, sputum production, SOB, wheezing, ALARCON, pleuritic pain   Cardiology:   chest pain, palpitations, orthopnea, PND, edema, syncope   Gastrointestinal:  abdominal pain , N/V, dysphagia, diarrhea, constipation, bleeding   Genitourinary:  frequency, urgency, dysuria, hematuria, incontinence   Muskuloskeletal :  arthralgia, myalgia   Hematology:  easy bruising, nose or gum bleeding, lymphadenopathy   Dermatological: rash, ulceration, pruritis   Endocrine:   hot flashes or polydipsia   Neurological:  headache, dizziness, confusion, focal weakness, paresthesia,     Speech difficulties, memory loss, gait disturbance  Psychological: Feelings of anxiety, depression, agitation    Objective:   VITALS:    Visit Vitals  /43   Pulse 94   Temp 99.2 °F (37.3 °C)   Resp 17   Ht 5' 7\" (1.702 m)   Wt 77.4 kg (170 lb 10.2 oz)   SpO2 95%   BMI 26.73 kg/m²     Temp (24hrs), Av.4 °F (36.9 °C), Min:97.6 °F (36.4 °C), Max:99.4 °F (37.4 °C)      PHYSICAL EXAM:   General:    Not very alert , verbalizing      HEENT: Atraumatic, anicteric sclerae, pink conjunctivae     No oral ulcers, mucosa moist, throat clear  Neck:  Supple, symmetrical,  thyroid: non tender  Lungs:   Clear to auscultation bilaterally. No Wheezing or Rhonchi. No rales. Chest wall:  No tenderness  No Accessory muscle use. Heart:   Regular  rhythm,  No  murmur   No edema  Abdomen:   Soft, non-tender. Not distended. Bowel sounds normal  Extremities: No cyanosis. No clubbing  Skin:     Not pale. Not Jaundiced  No rashes   Psych:  Good insight. Not depressed. Not anxious or agitated.   Neurologic: Confused oriented to self and place _______________________________________________________________________  Care Plan discussed with:    Comments   Patient x    Family  x Daughter in the room   RN x Nurse in room with me   Care Manager                    Consultant:  x Called consultant to discuss plan with him    ____________________________________________________________________      Comments     Reviewed previous records   >50% of visit spent in counseling and coordination of care  Discussion with patient and/or family and questions answered         ________________________________________________________________________  Signed: Pastor Dominic MD      Procedures: see electronic medical records for all procedures/Xrays and details which were not copied into this note but were reviewed prior to creation of Plan.     LAB DATA REVIEWED:    Recent Results (from the past 24 hour(s))   CBC W/O DIFF    Collection Time: 05/02/19  3:22 AM   Result Value Ref Range    WBC 10.5 3.6 - 11.0 K/uL    RBC 4.06 3.80 - 5.20 M/uL    HGB 12.1 11.5 - 16.0 g/dL    HCT 36.4 35.0 - 47.0 %    MCV 89.7 80.0 - 99.0 FL    MCH 29.8 26.0 - 34.0 PG    MCHC 33.2 30.0 - 36.5 g/dL    RDW 13.6 11.5 - 14.5 %    PLATELET 339 (L) 728 - 400 K/uL    MPV 11.6 8.9 - 12.9 FL    NRBC 0.0 0  WBC    ABSOLUTE NRBC 0.00 0.00 - 6.79 K/uL   METABOLIC PANEL, BASIC    Collection Time: 05/02/19  3:22 AM   Result Value Ref Range    Sodium 130 (L) 136 - 145 mmol/L    Potassium 4.0 3.5 - 5.1 mmol/L    Chloride 101 97 - 108 mmol/L    CO2 22 21 - 32 mmol/L    Anion gap 7 5 - 15 mmol/L    Glucose 173 (H) 65 - 100 mg/dL    BUN 14 6 - 20 MG/DL    Creatinine 0.90 0.55 - 1.02 MG/DL    BUN/Creatinine ratio 16 12 - 20      GFR est AA >60 >60 ml/min/1.73m2    GFR est non-AA >60 >60 ml/min/1.73m2    Calcium 8.1 (L) 8.5 - 10.1 MG/DL   MAGNESIUM    Collection Time: 05/02/19  3:22 AM   Result Value Ref Range    Magnesium 1.8 1.6 - 2.4 mg/dL       _____________________________  Hospitalist: Pastor Dominic MD

## 2019-05-02 NOTE — PROGRESS NOTES
General Surgery End of Shift Nursing Note Bedside shift change report given to Sherman Rushing (oncoming nurse) by Sandra Rivas RN (offgoing nurse). Report included the following information SBAR, Kardex, OR Summary, Intake/Output, MAR, Recent Results and Cardiac Rhythm NSR. Shift worked:   7p-7a Summary of shift:    Patient had no complaints of nausea or vomiting this night, remains oriented to self only, patient rested well sleeping at long intervals. Issues for physician to address:   none Number times ambulated in hallway past shift: 0 Number of times OOB to chair past shift: 0 Pain Management: 
Current medication: none requested Patient states pain is manageable on current pain medication: YES 
 
GI: 
 
Current diet:  DIET CLEAR LIQUID Tolerating current diet: YES Passing flatus:unknown Last Bowel Movement: several days ago Respiratory: 
 
Incentive Spirometer at bedside: YES Patient instructed on use: YES Patient Safety: 
 
Falls Score: 5 Bed Alarm On? Yes Sitter? No 
 
Amanda Mart

## 2019-05-02 NOTE — PROGRESS NOTES
Plan:  
-D/c to SNF for rehab 
 
UPDATE 3:49PM 
FOC for [facility] SNF on chart. Referral sent to [facility] through [facility]. Continuing to be followed by surgery. Hospitalist consulted for ongoing confusion. CM will continue to follow and assist with d/c planning. DANIEL Mahmood Care Manager

## 2019-05-02 NOTE — PROGRESS NOTES
[employer] Teles[employer] Monitor initiated on 5/2/2019 at  [phone] for the following reason(s): safety, fall prevention, prevention of elopement . Kimo Durand Patient educated on use of camera and is in agreement. If patient unable to verbalize understanding of camera necessity, the responsible party notified and educated:

## 2019-05-03 ENCOUNTER — APPOINTMENT (OUTPATIENT)
Dept: GENERAL RADIOLOGY | Age: 79
DRG: 335 | End: 2019-05-03
Attending: SURGERY
Payer: MEDICARE

## 2019-05-03 ENCOUNTER — APPOINTMENT (OUTPATIENT)
Dept: CT IMAGING | Age: 79
DRG: 335 | End: 2019-05-03
Attending: SURGERY
Payer: MEDICARE

## 2019-05-03 LAB
ARTERIAL PATENCY WRIST A: ABNORMAL
ATRIAL RATE: 117 BPM
BASE DEFICIT BLD-SCNC: 2 MMOL/L
BDY SITE: ABNORMAL
CALCULATED P AXIS, ECG09: 16 DEGREES
CALCULATED R AXIS, ECG10: 16 DEGREES
CALCULATED T AXIS, ECG11: 35 DEGREES
DIAGNOSIS, 93000: NORMAL
ERYTHROCYTE [DISTWIDTH] IN BLOOD BY AUTOMATED COUNT: 13.2 % (ref 11.5–14.5)
GAS FLOW.O2 O2 DELIVERY SYS: ABNORMAL L/MIN
GLUCOSE BLD STRIP.AUTO-MCNC: 109 MG/DL (ref 65–100)
HCO3 BLD-SCNC: 21.7 MMOL/L (ref 22–26)
HCT VFR BLD AUTO: 32.4 % (ref 35–47)
HGB BLD-MCNC: 11.1 G/DL (ref 11.5–16)
LACTATE SERPL-SCNC: 1.1 MMOL/L (ref 0.4–2)
MAGNESIUM SERPL-MCNC: 1.6 MG/DL (ref 1.6–2.4)
MCH RBC QN AUTO: 29.8 PG (ref 26–34)
MCHC RBC AUTO-ENTMCNC: 34.3 G/DL (ref 30–36.5)
MCV RBC AUTO: 86.9 FL (ref 80–99)
NRBC # BLD: 0 K/UL (ref 0–0.01)
NRBC BLD-RTO: 0 PER 100 WBC
O2/TOTAL GAS SETTING VFR VENT: 40 %
P-R INTERVAL, ECG05: 186 MS
PCO2 BLD: 30.3 MMHG (ref 35–45)
PH BLD: 7.46 [PH] (ref 7.35–7.45)
PLATELET # BLD AUTO: 154 K/UL (ref 150–400)
PMV BLD AUTO: 10.9 FL (ref 8.9–12.9)
PO2 BLD: 97 MMHG (ref 80–100)
Q-T INTERVAL, ECG07: 430 MS
QRS DURATION, ECG06: 74 MS
QTC CALCULATION (BEZET), ECG08: 599 MS
RBC # BLD AUTO: 3.73 M/UL (ref 3.8–5.2)
SAO2 % BLD: 98 % (ref 92–97)
SERVICE CMNT-IMP: ABNORMAL
SPECIMEN TYPE: ABNORMAL
TOTAL RESP. RATE, ITRR: 24
VENTRICULAR RATE, ECG03: 117 BPM
WBC # BLD AUTO: 9.9 K/UL (ref 3.6–11)

## 2019-05-03 PROCEDURE — 74011250636 HC RX REV CODE- 250/636: Performed by: HOSPITALIST

## 2019-05-03 PROCEDURE — 74011636320 HC RX REV CODE- 636/320: Performed by: SURGERY

## 2019-05-03 PROCEDURE — 85027 COMPLETE CBC AUTOMATED: CPT

## 2019-05-03 PROCEDURE — 71045 X-RAY EXAM CHEST 1 VIEW: CPT

## 2019-05-03 PROCEDURE — 74011250636 HC RX REV CODE- 250/636: Performed by: SURGERY

## 2019-05-03 PROCEDURE — 97116 GAIT TRAINING THERAPY: CPT

## 2019-05-03 PROCEDURE — 82962 GLUCOSE BLOOD TEST: CPT

## 2019-05-03 PROCEDURE — 82803 BLOOD GASES ANY COMBINATION: CPT

## 2019-05-03 PROCEDURE — 80053 COMPREHEN METABOLIC PANEL: CPT

## 2019-05-03 PROCEDURE — 83735 ASSAY OF MAGNESIUM: CPT

## 2019-05-03 PROCEDURE — 97530 THERAPEUTIC ACTIVITIES: CPT

## 2019-05-03 PROCEDURE — 74011250637 HC RX REV CODE- 250/637: Performed by: SURGERY

## 2019-05-03 PROCEDURE — 65660000000 HC RM CCU STEPDOWN

## 2019-05-03 PROCEDURE — 36415 COLL VENOUS BLD VENIPUNCTURE: CPT

## 2019-05-03 PROCEDURE — 74177 CT ABD & PELVIS W/CONTRAST: CPT

## 2019-05-03 PROCEDURE — 93005 ELECTROCARDIOGRAM TRACING: CPT

## 2019-05-03 PROCEDURE — 83605 ASSAY OF LACTIC ACID: CPT

## 2019-05-03 PROCEDURE — 36600 WITHDRAWAL OF ARTERIAL BLOOD: CPT

## 2019-05-03 PROCEDURE — 74011250637 HC RX REV CODE- 250/637: Performed by: INTERNAL MEDICINE

## 2019-05-03 PROCEDURE — 87040 BLOOD CULTURE FOR BACTERIA: CPT

## 2019-05-03 PROCEDURE — 74011000258 HC RX REV CODE- 258: Performed by: HOSPITALIST

## 2019-05-03 RX ORDER — ACETAMINOPHEN 650 MG/1
650 SUPPOSITORY RECTAL
Status: DISCONTINUED | OUTPATIENT
Start: 2019-05-03 | End: 2019-05-10 | Stop reason: HOSPADM

## 2019-05-03 RX ORDER — SODIUM CHLORIDE 9 MG/ML
75 INJECTION, SOLUTION INTRAVENOUS CONTINUOUS
Status: DISCONTINUED | OUTPATIENT
Start: 2019-05-03 | End: 2019-05-03

## 2019-05-03 RX ORDER — SODIUM CHLORIDE 0.9 % (FLUSH) 0.9 %
10 SYRINGE (ML) INJECTION
Status: COMPLETED | OUTPATIENT
Start: 2019-05-03 | End: 2019-05-03

## 2019-05-03 RX ADMIN — FLUOXETINE HYDROCHLORIDE 20 MG: 20 SOLUTION ORAL at 09:07

## 2019-05-03 RX ADMIN — QUETIAPINE FUMARATE 25 MG: 25 TABLET ORAL at 21:37

## 2019-05-03 RX ADMIN — Medication 10 ML: at 21:38

## 2019-05-03 RX ADMIN — ACETAMINOPHEN 650 MG: 650 SUPPOSITORY RECTAL at 23:14

## 2019-05-03 RX ADMIN — Medication 10 ML: at 17:47

## 2019-05-03 RX ADMIN — CEFEPIME HYDROCHLORIDE 2 G: 2 INJECTION, POWDER, FOR SOLUTION INTRAVENOUS at 23:54

## 2019-05-03 RX ADMIN — Medication 10 ML: at 13:11

## 2019-05-03 RX ADMIN — DIATRIZOATE MEGLUMINE AND DIATRIZOATE SODIUM 30 ML: 660; 100 LIQUID ORAL; RECTAL at 13:49

## 2019-05-03 RX ADMIN — ENOXAPARIN SODIUM 40 MG: 40 INJECTION SUBCUTANEOUS at 09:07

## 2019-05-03 RX ADMIN — IOPAMIDOL 100 ML: 755 INJECTION, SOLUTION INTRAVENOUS at 17:47

## 2019-05-03 NOTE — PROGRESS NOTES
Occupational Therapy Attempt to see for treatment however having test completed in room, will follow up as able.  
Elicia Romo OTR/L

## 2019-05-03 NOTE — PROGRESS NOTES
Spiritual Care Assessment/Progress Note [address] 
 
 
NAME: Kavon Carrera      MRN: [id number] AGE: 66 y.o. SEX: female Christian Affiliation: Emery Ferguson  
Language: [place] 5/3/2019     Total Time (in minutes): 14 Spiritual Assessment begun in [facility] GENERAL SURGERY through conversation with: 
  
    [x]Patient        [x] Family    [] Friend(s) Reason for Consult: Initial/Spiritual assessment, patient floor Spiritual beliefs: (Please include comment if needed) [x] Identifies with a arron tradition:     
   [x] Supported by a arron community:        
   [] Claims no spiritual orientation:       
   [] Seeking spiritual identity:            
   [] Adheres to an individual form of spirituality:       
   [] Not able to assess:                   
 
    
Identified resources for coping:  
   [] Prayer                           
   [] Music                  [] Guided Imagery [x] Family/friends                 [] Pet visits [] Devotional reading                         [] Unknown 
   [] Other:                                          
 
 
Interventions offered during this visit: (See comments for more details) Patient Interventions: Initial/Spiritual assessment, patient floor, Initial visit, Affirmation of arron, Affirmation of emotions/emotional suffering, Normalization of emotional/spiritual concerns Family/Friend(s): Initial Assessment, Affirmation of emotions/emotional suffering Plan of Care: 
 
 [x] Support spiritual and/or cultural needs  
 [] Support AMD and/or advance care planning process    
 [] Support grieving process 
 [] Coordinate Rites and/or Rituals  
 [] Coordination with community clergy [] No spiritual needs identified at this time 
 [] Detailed Plan of Care below (See Comments)  [] Make referral to Music Therapy 
[] Make referral to Pet Therapy    
[] Make referral to Addiction services 
[] Make referral to [facility] [] Make referral to Spiritual Care Partner 
[] No future visits requested       
[x] Follow up visits as needed Comments:  made initial visit to patients room in Gen Surg. Patient was sitting in chair in room and daughter was sitting on the window bench. The daughter, Carmenza Valencia, did most of the talking during this visit. She told of patients medical condition and that she just had surgery 2 days ago. The patient lives with her daughter. The daughter said that she has been very independent. Patient said she was [facility], but had not attended recently.  provided pastoral care and support to the patient and daughter during this visit. Spiritual Care will follow up as needed and able. MD Quinniv Pager: [phone]

## 2019-05-03 NOTE — PROGRESS NOTES
Initial Nutrition Assessment: 
 
INTERVENTIONS/RECOMMENDATIONS:  
· Meals/Snacks: General/healthful diet:  Continue clear liquids with progression as medically feasible/tolerated. RD will continue to follow for further nutrition intervention as diet progresses. ASSESSMENT:  
5/3:  Chart reviewed; med noted for SBO with acute met encephalopathy. Pt started drinking the contrast today for CT; RN encouraging the pt to consume. Pt is on clear liquids with intake suboptimal.  RD screened as pt has been NPO or CL x 5 days. Diet Order: Clear liquids 
% Eaten:   
Patient Vitals for the past 72 hrs: 
 % Diet Eaten 05/01/19 1900 10 % Pertinent Medications: [x]Reviewed []Other: lovenox, seroquel Pertinent Labs: [x]Reviewed []Other:  Food Allergies: [x]None []Other Last BM: 5/3   [x]Active     []Hyperactive  []Hypoactive       [] Absent BS Skin:    [x] Intact   [] Incision  [] Breakdown  [] Other: Anthropometrics:  
Height: 5' 7\" (170.2 cm) Weight: 77.4 kg (170 lb 10.2 oz) IBW (%IBW):   ( ) UBW (%UBW):   (  %) Last Weight Metrics: 
Weight Loss Metrics 5/1/2019 7/26/2018 6/26/2018 2/17/2017 6/9/2016 4/29/2016 8/1/2014 Today's Wt 170 lb 10.2 oz 180 lb 181 lb 181 lb 4 oz 178 lb 179 lb 170 lb BMI 26.73 kg/m2 28.19 kg/m2 28.35 kg/m2 28.39 kg/m2 27.45 kg/m2 28.03 kg/m2 26.62 kg/m2 BMI: Body mass index is 26.73 kg/m². This BMI is indicative of: 
 []Underweight    []Normal    [x]Overweight    [] Obesity   [] Extreme Obesity (BMI>40) Estimated Nutrition Needs (Based on):  
3298 Kcals/day(BMR (1286) x 1. 3AF) , 77 g(1.0 g/kg bw) Protein Carbohydrate: At Least 130 g/day  Fluids: 1700 mL/day (1ml/kcal) NUTRITION DIAGNOSES:  
Problem:  Inadequate protein-energy intake Etiology: related to SBO, acute met encephalopathy Signs/Symptoms: as evidenced by CL/NPO x 5 days NUTRITION INTERVENTIONS: 
Meals/Snacks: General/healthful diet GOAL:  
 Progress diet as medically feasible within 2-4 days LEARNING NEEDS (Diet, Food/Nutrient-Drug Interaction):  
 [x] None Identified 
 [] Identified and Education Provided/Documented 
 [] Identified and Pt declined/was not appropriate Cultureal, Roman Catholic, OR Ethnic Dietary Needs:  
 [x] None Identified 
 [] Identified and Addressed 
 
 [x] Interdisciplinary Care Plan Reviewed/Documented  
 [x] Discharge Planning: Too early to determine MONITORING /EVALUATION:  
Food/Nutrient Intake Outcomes: Total energy intake Physical Signs/Symptoms Outcomes: GI profile, Weight/weight change NUTRITION RISK:  
 [x] Patient At Nutritional Risk  
 [] Patient Not At Nutritional Risk PT SEEN FOR:  
 []  MD Consult: []Calorie Count []Diabetic Diet Education []Diet Education []Electrolyte Management []General Nutrition Management and Supplements []Management of Tube Feeding []TPN Recommendations []  RN Referral:  []MST score >=2 
   []Enteral/Parenteral Nutrition PTA []Pregnant: Gestational DM or Multigestation 
   []Pressure Ulcer/Wound Care needs 
     
[]  Low BMI [x]  CL/NPO x 5 days Carolee Stover RD Pager [phone] Weekend Pager [phone]

## 2019-05-03 NOTE — PROCEDURES
[place] [facility], [address]      Electroencephalogram         Procedure Date: 05/03/2019    Procedure ID: MRA     Procedure Type: Routine    Patient Name: Chetan Mars     YOB: 1940    Medical Record No: [id number]    INDICATION: Altered mental status    Medications:  Current Facility-Administered Medications   Medication Dose Route Frequency    QUEtiapine (SEROquel) tablet 25 mg  25 mg Oral QHS    ALPRAZolam (XANAX) tablet 0.5 mg  0.5 mg Oral QHS PRN    acetaminophen (TYLENOL) tablet 650 mg  650 mg Oral Q6H PRN    sodium chloride (NS) flush 5-10 mL  5-10 mL IntraVENous PRN    sodium chloride (NS) flush 5-40 mL  5-40 mL IntraVENous PRN    sodium chloride (NS) flush 5-40 mL  5-40 mL IntraVENous Q8H    sodium chloride (NS) flush 5-40 mL  5-40 mL IntraVENous PRN    prochlorperazine (COMPAZINE) with saline injection 10 mg  10 mg IntraVENous Q6H PRN    scopolamine (TRANSDERM-SCOP) 1 mg over 3 days 1 Patch  1 Patch TransDERmal Q72H    sodium chloride (NS) flush 5-40 mL  5-40 mL IntraVENous PRN    morphine injection 2 mg  2 mg IntraVENous Q4H PRN    naloxone (NARCAN) injection 0.4 mg  0.4 mg IntraVENous PRN    ondansetron (ZOFRAN) injection 4 mg  4 mg IntraVENous Q4H PRN    enoxaparin (LOVENOX) injection 40 mg  40 mg SubCUTAneous DAILY    FLUoxetine (PROzac) 20 mg/5 mL (4 mg/mL) oral solution 20 mg  20 mg Oral DAILY       DESCRIPTION OF PROCEDURE: Electrodes were applied in accordance with the international 10-20 system of electrode placement. EEG was reviewed in both bipolar and referential montages    Description of Activity:  During brief periods of wakefulness when aroused, patient is only able to mount 7 Hz posterior frequency. During drowsiness, there is attenuation of the underlying frequency and low voltage theta activity occurs bilaterally. Light sleep is recorded.      Intermittent photic stimulation was performed and did not induce posterior driving responses. No sharp or spike discharges, seizures or epileptiform discharges seen. No focal asymmetry. Clinical Interpretation: This EEG, performed during wakefulness and sleep is abnormal. There is mild generalized slowing as seen in encephalopathies. There is no focal asymmetry, seizures or epileptiform discharges seen.

## 2019-05-03 NOTE — PROGRESS NOTES
Hospitalist Progress Note NAME: Ibeth Moore :  1940 MRN:  [id number] Assessment / Plan: 
Acute metabolic Encephalopathy 
 etiology unclear likely multifactorial, polypharmacy vs dehydration Protonix, zosyn DC Ct scan brain unremarkable , labs wnl . No bacterial  
Pt started on seroquel , her xanax was restarted EEG pending  
  
Hyponatremia   
JOSH most likely prerenal  
Na 130 ( down from 135)  ,  c/w encouraging PO intake , daily bmp 
  
Anxiety: c/w  Xanax and  Prozac  
  
SBO s/p explorative laprascopy  With lysis of adhesions, 
 care per surgery Body mass index is 26.73 kg/m². therefore classifying patient as overweight, NOS (body mass index [BMI] of 25 to 29.9 kg/m2) Code status: Full Prophylaxis: Lovenox Recommended Disposition: per surgery Subjective: Chief Complaint / Reason for Physician Visit FU AMS , more AAOx3 , denies CP , SOB , abdominal pain . Discussed with RN events overnight. Review of Systems: 
Symptom Y/N Comments  Symptom Y/N Comments Fever/Chills n   Chest Pain n   
Poor Appetite y   Edema Cough    Abdominal Pain n   
Sputum    Joint Pain SOB/ALARCON n   Pruritis/Rash Nausea/vomit n   Tolerating PT/OT Diarrhea    Tolerating Diet y CLD Constipation    Other Could NOT obtain due to:   
 
Objective: VITALS:  
Last 24hrs VS reviewed since prior progress note. Most recent are: 
Patient Vitals for the past 24 hrs: 
 Temp Pulse Resp BP SpO2  
19 0812 98 °F (36.7 °C) 78 16 150/56 90 % 19 0415 99.1 °F (37.3 °C) (!) 109 16 134/67 (!) 85 % 19 2349 98.5 °F (36.9 °C) 98 16 (!) 123/103 (!) 88 % 19 1900 98.8 °F (37.1 °C) 95 17 133/47 92 % 19 1510 99.2 °F (37.3 °C) 94 17 120/43 95 % 19 1238 98.2 °F (36.8 °C) 96 18 113/50 95 % Intake/Output Summary (Last 24 hours) at 5/3/2019 7742 Last data filed at 5/3/2019 6481 Gross per 24 hour Intake  Output 1325 ml Net -1325 ml  
  
 
 PHYSICAL EXAM: 
General: WD, WN. Alert, cooperative, no acute distress   
EENT:  EOMI. Anicteric sclerae. MMM Resp:  CTA bilaterally, no wheezing or rales. No accessory muscle use CV:  Regular  rhythm,  No edema GI:  Soft, Non distended, Non tender.  +Bowel sounds Neurologic:  Alert and oriented X 3, normal speech, Psych:   Good insight. Not anxious nor agitated Skin:  No rashes. No jaundice Reviewed most current lab test results and cultures  YES Reviewed most current radiology test results   YES Review and summation of old records today    NO Reviewed patient's current orders and MAR    YES 
PMH/SH reviewed - no change compared to H&P 
________________________________________________________________________ Care Plan discussed with: 
  Comments Patient y Family RN y   
Care Manager Consultant Multidiciplinary team rounds were held today with , nursing, pharmacist and clinical coordinator. Patient's plan of care was discussed; medications were reviewed and discharge planning was addressed. ________________________________________________________________________ Total NON critical care TIME:  20   Minutes Total CRITICAL CARE TIME Spent:   Minutes non procedure based Comments >50% of visit spent in counseling and coordination of care    
________________________________________________________________________ Jeffery Ramirez MD  
 
Procedures: see electronic medical records for all procedures/Xrays and details which were not copied into this note but were reviewed prior to creation of Plan. LABS: 
I reviewed today's most current labs and imaging studies. Pertinent labs include: 
Recent Labs 05/02/19 
0322 05/01/19 
0231 04/30/19 
1007 WBC 10.5 7.2 19.8* HGB 12.1 11.8 15.2 HCT 36.4 36.2 44.5 * 117* 184 Recent Labs 05/03/19 2065 05/02/19 
0119 05/01/19 
0231 04/30/19 
1007 NA  --  130* 135* 134* K  --  4.0 4.7 4.6 CL  --  101 107 105 CO2  --  22 21 21 GLU  --  173* 195* 168* BUN  --  14 20 23* CREA  --  0.90 1.34* 1.72* CA  --  8.1* 7.8* 8.4* MG 1.6 1.8 1.6  --   
ALB  --   --   --  3.2* TBILI  --   --   --  0.8 SGOT  --   --   --  20 ALT  --   --   --  19 Signed: Marielos Lentz MD

## 2019-05-03 NOTE — PROGRESS NOTES
Bedside shift change report given to Teena Martínez (oncoming nurse) by Yuan Maynard RN (offgoing nurse). Report included the following information SBAR, Kardex, OR Summary, Intake/Output, MAR, Recent Results and Cardiac Rhythm NSR. Patient rested poorly this night sleeping little, remains very confused with difficulty communicating her needs, tele sitter remains in use for patient safety.

## 2019-05-03 NOTE — PROGRESS NOTES
Problem: Mobility Impaired (Adult and Pediatric) Goal: *Acute Goals and Plan of Care (Insert Text) Description Physical Therapy Goals Initiated 5/1/2019 1. Patient will move from supine to sit and sit to supine , scoot up and down and roll side to side in bed with supervision/set-up within 7 day(s). 2.  Patient will transfer from bed to chair and chair to bed with minimal assistance/contact guard assist using the least restrictive device within 7 day(s). 3.  Patient will perform sit to stand with minimal assistance/contact guard assist within 7 day(s). 4.  Patient will ambulate with minimal assistance/contact guard assist for 50 feet with the least restrictive device within 7 day(s). Outcome: Progressing Towards Goal 
 PHYSICAL THERAPY TREATMENT Patient: Jorge Edwards (83 y.o. female) Date: 5/3/2019 [facility] [K56.609] <principal problem not specified> Procedure(s) (LRB): 
LAPAROSCOPY GENERAL DIAGNOSTIC LYSIS OF ADHESIONS (N/A) 3 Days Post-Op Precautions: Fall Chart, physical therapy assessment, plan of care and goals were reviewed. ASSESSMENT: 
Physical therapy visit completed on a 66year old female admitted with SBO and s/p lysis of adhesions. Patient received in recliner and agreeable to participate. Patient is still confused and telesitter in room, can follow simple commands but needs assist and frequent redirection for safety during visit. Patient able to come to stand with min assist to walker, requires manual cuing to put hands on walker. Patient ambulated into hallway x approx 40 feet with min assist for walker management and safety. Patient ambulated into bathroom to use toilet and performed hygiene with verbal cuing, then returned to recliner. Patient left with call bell in reach, chair alarm and telesitter. Patient will benefit from continued therapy at SNF. Progression toward goals: ?    Improving appropriately and progressing toward goals ? Improving slowly and progressing toward goals ? Not making progress toward goals and plan of care will be adjusted PLAN: 
Patient continues to benefit from skilled intervention to address the above impairments. Continue treatment per established plan of care. Discharge Recommendations:  Cain Prescott Further Equipment Recommendations for Discharge: SUBJECTIVE:  
Patient stated ? there's a cute little face under the bed? OBJECTIVE DATA SUMMARY:  
Critical Behavior: 
Neurologic State: Alert Orientation Level: Oriented to person, Oriented to place, Oriented to situation Cognition: Follows commands, Impaired decision making, Impulsive Safety/Judgement: Awareness of environment, Decreased insight into deficits, Decreased awareness of need for safety, Decreased awareness of need for assistance Functional Mobility Training: 
Bed Mobility: 
  
  
  
  
  
  
Transfers: 
Sit to Stand: Minimum assistance Stand to Sit: Minimum assistance Balance: 
Sitting: Impaired Sitting - Static: Fair (occasional) Sitting - Dynamic: Fair (occasional) Standing: Impaired Standing - Static: Constant support; Fair 
Standing - Dynamic : Constant support; Andrews Muller Ambulation/Gait Training: 
Distance (ft): 40 Feet (ft) Assistive Device: Gait belt;Walker, rolling Ambulation - Level of Assistance: Minimal assistance Gait Abnormalities: Decreased step clearance Speed/Xochilt: Pace decreased (<100 feet/min) Step Length: Left shortened;Right shortened Stairs: 
  
  
   
 
 
Pain: 
  
  
  
  
  
  
Activity Tolerance:  
Fair, improving Please refer to the flowsheet for vital signs taken during this treatment. After treatment:  
?    Patient left in no apparent distress sitting up in chair ? Patient left in no apparent distress in bed 
? Call bell left within reach ?    Nursing notified ? Caregiver present ? Bed alarm activated COMMUNICATION/COLLABORATION:  
The patient?s plan of care was discussed with: Registered Nurse Gregg Nath, PT Time Calculation: 25 mins

## 2019-05-03 NOTE — PROGRESS NOTES
SURGERY PROGRESS NOTE Admit Date: 2019 POD 3 Days Post-Op Procedure: Procedure(s): LAPAROSCOPY GENERAL DIAGNOSTIC LYSIS OF ADHESIONS Subjective:  
 
Patient remains confused. Denies pain. Had a BM. Tolerating some liquids Objective:  
 
Visit Vitals /61 (BP 1 Location: Left arm) Pulse 94 Temp 98.6 °F (37 °C) Resp 17 Ht 5' 7\" (1.702 m) Wt 77.4 kg (170 lb 10.2 oz) SpO2 96% BMI 26.73 kg/m² Temp (24hrs), Av.6 °F (37 °C), Min:98 °F (36.7 °C), Max:99.1 °F (37.3 °C) 
 
 
701 -  190 In: 480 [P.O.:480] Out: 300 [Urine:300] 1901 -  0700 In: 1883.3 [I.V.:1883.3] Out: 5552 [Urine:2575] Physical Exam:   
General:  alert, cooperative, no distress, appears stated age Abdomen: soft, bowel sounds active, non-tender, distended Incision:   healing well, no drainage, no erythema, no hernia, no seroma, no swelling, no dehiscence, incision well approximated Lab Results Component Value Date/Time WBC 10.5 2019 03:22 AM  
 HGB 12.1 2019 03:22 AM  
 HCT 36.4 2019 03:22 AM  
 PLATELET 025 (L)  03:22 AM  
 MCV 89.7 2019 03:22 AM  
 
Lab Results Component Value Date/Time GFR est non-AA >60 2019 03:22 AM  
 GFR est AA >60 2019 03:22 AM  
 Creatinine 0.90 2019 03:22 AM  
 BUN 14 2019 03:22 AM  
 Sodium 130 (L) 2019 03:22 AM  
 Potassium 4.0 2019 03:22 AM  
 Chloride 101 2019 03:22 AM  
 CO2 22 2019 03:22 AM  
 Magnesium 1.6 2019 03:38 AM  
 Phosphorus 3.0 2019 02:44 AM  
 
 
Assessment:  
 
Active Problems: 
  SBO (small bowel obstruction) ([facility] (2019) stable but, confusion persists. Will get abd CT to assess for abscess Plan:  
 
 
Continue present treatment

## 2019-05-03 NOTE — PROGRESS NOTES
Plan: 
-D/c to [facility] for SNF rehab 
-Family v BLS transport. UPDATE 4:19PM 
Pt accepted at [facility]. Will need to be tele-sitter free for 24 hrs prior to d/c. Continuing to be followed by neurology, hospitalist, surgery, and therapy. CM will continue to follow and assist with d/c planning. DANIEL Elliott Care Manager

## 2019-05-03 NOTE — PROGRESS NOTES
General Surgery End of Shift Nursing Note Bedside shift change report given to *** (oncoming nurse) by Celia Hawthorne RN (offgoing nurse). Report included the following information SBAR, Kardex, OR Summary, Intake/Output, MAR and Recent Results. Shift worked:   7A to [employer] Summary of shift:    EEG done today due to confusion. CT abd/pelvis today, BM today. Issues for physician to address:   Trouble following directions,  Likes to keep eyes closed,  Needs re-orienting frequently. Not taking oral liquids well,  Refuses liquids after a few bites or sips of everything Number times ambulated in hallway past shift: 1 Number of times OOB to chair past shift: 2 Pain Management: 
Current medication: *** Patient states pain is manageable on current pain medication: YES 
 
GI: 
 
Current diet:  DIET CLEAR LIQUID Tolerating current diet: YES,  Clear liquids Passing flatus: YES Last Bowel Movement: today Appearance: soft formed Respiratory: 
 
Incentive Spirometer at bedside: YES Patient instructed on use: YES Patient Safety: 
 
Falls Score: 3 Bed Alarm On? Yes Sitter? Yes, tele sitter Rhett Holland RN

## 2019-05-04 ENCOUNTER — APPOINTMENT (OUTPATIENT)
Dept: GENERAL RADIOLOGY | Age: 79
DRG: 335 | End: 2019-05-04
Attending: SURGERY
Payer: MEDICARE

## 2019-05-04 LAB
ALBUMIN SERPL-MCNC: 3 G/DL (ref 3.5–5)
ALBUMIN/GLOB SERPL: 0.8 {RATIO} (ref 1.1–2.2)
ALP SERPL-CCNC: 71 U/L (ref 45–117)
ALT SERPL-CCNC: 25 U/L (ref 12–78)
AMORPH CRY URNS QL MICRO: ABNORMAL
ANION GAP SERPL CALC-SCNC: 10 MMOL/L (ref 5–15)
ANION GAP SERPL CALC-SCNC: 11 MMOL/L (ref 5–15)
APPEARANCE UR: ABNORMAL
AST SERPL-CCNC: 46 U/L (ref 15–37)
ATRIAL RATE: 112 BPM
BACTERIA URNS QL MICRO: NEGATIVE /HPF
BASOPHILS # BLD: 0 K/UL (ref 0–0.1)
BASOPHILS NFR BLD: 0 % (ref 0–1)
BILIRUB SERPL-MCNC: 1.8 MG/DL (ref 0.2–1)
BILIRUB UR QL CFM: NEGATIVE
BUN SERPL-MCNC: 11 MG/DL (ref 6–20)
BUN SERPL-MCNC: 12 MG/DL (ref 6–20)
BUN/CREAT SERPL: 15 (ref 12–20)
BUN/CREAT SERPL: 16 (ref 12–20)
CALCIUM SERPL-MCNC: 7.9 MG/DL (ref 8.5–10.1)
CALCIUM SERPL-MCNC: 8.3 MG/DL (ref 8.5–10.1)
CALCULATED P AXIS, ECG09: 64 DEGREES
CALCULATED R AXIS, ECG10: 11 DEGREES
CALCULATED T AXIS, ECG11: 15 DEGREES
CHLORIDE SERPL-SCNC: 101 MMOL/L (ref 97–108)
CHLORIDE SERPL-SCNC: 98 MMOL/L (ref 97–108)
CO2 SERPL-SCNC: 24 MMOL/L (ref 21–32)
CO2 SERPL-SCNC: 24 MMOL/L (ref 21–32)
COLOR UR: ABNORMAL
CREAT SERPL-MCNC: 0.75 MG/DL (ref 0.55–1.02)
CREAT SERPL-MCNC: 0.77 MG/DL (ref 0.55–1.02)
DIAGNOSIS, 93000: NORMAL
DIFFERENTIAL METHOD BLD: ABNORMAL
EOSINOPHIL # BLD: 0.1 K/UL (ref 0–0.4)
EOSINOPHIL NFR BLD: 1 % (ref 0–7)
EPITH CASTS URNS QL MICRO: ABNORMAL /LPF
ERYTHROCYTE [DISTWIDTH] IN BLOOD BY AUTOMATED COUNT: 13.2 % (ref 11.5–14.5)
GLOBULIN SER CALC-MCNC: 3.6 G/DL (ref 2–4)
GLUCOSE SERPL-MCNC: 117 MG/DL (ref 65–100)
GLUCOSE SERPL-MCNC: 94 MG/DL (ref 65–100)
GLUCOSE UR STRIP.AUTO-MCNC: NEGATIVE MG/DL
HCT VFR BLD AUTO: 35 % (ref 35–47)
HGB BLD-MCNC: 11.9 G/DL (ref 11.5–16)
HGB UR QL STRIP: ABNORMAL
IMM GRANULOCYTES # BLD AUTO: 0.1 K/UL (ref 0–0.04)
IMM GRANULOCYTES NFR BLD AUTO: 1 % (ref 0–0.5)
KETONES UR QL STRIP.AUTO: 15 MG/DL
LACTATE SERPL-SCNC: 1 MMOL/L (ref 0.4–2)
LEUKOCYTE ESTERASE UR QL STRIP.AUTO: NEGATIVE
LYMPHOCYTES # BLD: 0.8 K/UL (ref 0.8–3.5)
LYMPHOCYTES NFR BLD: 8 % (ref 12–49)
MAGNESIUM SERPL-MCNC: 2 MG/DL (ref 1.6–2.4)
MCH RBC QN AUTO: 29.4 PG (ref 26–34)
MCHC RBC AUTO-ENTMCNC: 34 G/DL (ref 30–36.5)
MCV RBC AUTO: 86.4 FL (ref 80–99)
MONOCYTES # BLD: 1 K/UL (ref 0–1)
MONOCYTES NFR BLD: 11 % (ref 5–13)
MUCOUS THREADS URNS QL MICRO: ABNORMAL /LPF
NEUTS SEG # BLD: 7.5 K/UL (ref 1.8–8)
NEUTS SEG NFR BLD: 79 % (ref 32–75)
NITRITE UR QL STRIP.AUTO: NEGATIVE
NRBC # BLD: 0 K/UL (ref 0–0.01)
NRBC BLD-RTO: 0 PER 100 WBC
P-R INTERVAL, ECG05: 174 MS
PH UR STRIP: 6 [PH] (ref 5–8)
PLATELET # BLD AUTO: 156 K/UL (ref 150–400)
PMV BLD AUTO: 10.8 FL (ref 8.9–12.9)
POTASSIUM SERPL-SCNC: 3.3 MMOL/L (ref 3.5–5.1)
POTASSIUM SERPL-SCNC: 3.7 MMOL/L (ref 3.5–5.1)
PROT SERPL-MCNC: 6.6 G/DL (ref 6.4–8.2)
PROT UR STRIP-MCNC: 100 MG/DL
Q-T INTERVAL, ECG07: 320 MS
QRS DURATION, ECG06: 74 MS
QTC CALCULATION (BEZET), ECG08: 436 MS
RBC # BLD AUTO: 4.05 M/UL (ref 3.8–5.2)
RBC #/AREA URNS HPF: ABNORMAL /HPF (ref 0–5)
RBC MORPH BLD: ABNORMAL
SODIUM SERPL-SCNC: 132 MMOL/L (ref 136–145)
SODIUM SERPL-SCNC: 136 MMOL/L (ref 136–145)
SP GR UR REFRACTOMETRY: 1.01 (ref 1–1.03)
UA: UC IF INDICATED,UAUC: ABNORMAL
UROBILINOGEN UR QL STRIP.AUTO: 0.2 EU/DL (ref 0.2–1)
VENTRICULAR RATE, ECG03: 112 BPM
WBC # BLD AUTO: 9.5 K/UL (ref 3.6–11)
WBC URNS QL MICRO: ABNORMAL /HPF (ref 0–4)

## 2019-05-04 PROCEDURE — 74011000258 HC RX REV CODE- 258: Performed by: SURGERY

## 2019-05-04 PROCEDURE — 36415 COLL VENOUS BLD VENIPUNCTURE: CPT

## 2019-05-04 PROCEDURE — 77010033678 HC OXYGEN DAILY

## 2019-05-04 PROCEDURE — 81001 URINALYSIS AUTO W/SCOPE: CPT

## 2019-05-04 PROCEDURE — 80048 BASIC METABOLIC PNL TOTAL CA: CPT

## 2019-05-04 PROCEDURE — 74011250636 HC RX REV CODE- 250/636: Performed by: SURGERY

## 2019-05-04 PROCEDURE — 74018 RADEX ABDOMEN 1 VIEW: CPT

## 2019-05-04 PROCEDURE — 85025 COMPLETE CBC W/AUTO DIFF WBC: CPT

## 2019-05-04 PROCEDURE — 65610000006 HC RM INTENSIVE CARE

## 2019-05-04 PROCEDURE — 83735 ASSAY OF MAGNESIUM: CPT

## 2019-05-04 PROCEDURE — 74011250636 HC RX REV CODE- 250/636: Performed by: HOSPITALIST

## 2019-05-04 PROCEDURE — 74011250636 HC RX REV CODE- 250/636

## 2019-05-04 RX ORDER — FUROSEMIDE 10 MG/ML
INJECTION INTRAMUSCULAR; INTRAVENOUS
Status: DISPENSED
Start: 2019-05-04 | End: 2019-05-04

## 2019-05-04 RX ORDER — FUROSEMIDE 10 MG/ML
20 INJECTION INTRAMUSCULAR; INTRAVENOUS ONCE
Status: COMPLETED | OUTPATIENT
Start: 2019-05-04 | End: 2019-05-04

## 2019-05-04 RX ORDER — POTASSIUM CHLORIDE 7.45 MG/ML
10 INJECTION INTRAVENOUS
Status: COMPLETED | OUTPATIENT
Start: 2019-05-04 | End: 2019-05-04

## 2019-05-04 RX ORDER — VANCOMYCIN/0.9 % SOD CHLORIDE 1.5G/250ML
1500 PLASTIC BAG, INJECTION (ML) INTRAVENOUS ONCE
Status: COMPLETED | OUTPATIENT
Start: 2019-05-04 | End: 2019-05-04

## 2019-05-04 RX ORDER — POTASSIUM CHLORIDE 7.45 MG/ML
INJECTION INTRAVENOUS
Status: COMPLETED
Start: 2019-05-04 | End: 2019-05-04

## 2019-05-04 RX ADMIN — Medication 10 ML: at 23:47

## 2019-05-04 RX ADMIN — CEFEPIME 2 G: 2 INJECTION, POWDER, FOR SOLUTION INTRAVENOUS at 17:01

## 2019-05-04 RX ADMIN — POTASSIUM CHLORIDE 10 MEQ: 10 INJECTION, SOLUTION INTRAVENOUS at 01:36

## 2019-05-04 RX ADMIN — POTASSIUM CHLORIDE 10 MEQ: 10 INJECTION, SOLUTION INTRAVENOUS at 03:30

## 2019-05-04 RX ADMIN — VANCOMYCIN HYDROCHLORIDE 1500 MG: 10 INJECTION, POWDER, LYOPHILIZED, FOR SOLUTION INTRAVENOUS at 01:34

## 2019-05-04 RX ADMIN — Medication 10 ML: at 06:17

## 2019-05-04 RX ADMIN — POTASSIUM CHLORIDE 10 MEQ: 10 INJECTION, SOLUTION INTRAVENOUS at 04:38

## 2019-05-04 RX ADMIN — ENOXAPARIN SODIUM 40 MG: 40 INJECTION SUBCUTANEOUS at 08:25

## 2019-05-04 RX ADMIN — VANCOMYCIN HYDROCHLORIDE 750 MG: 750 INJECTION, POWDER, LYOPHILIZED, FOR SOLUTION INTRAVENOUS at 12:03

## 2019-05-04 RX ADMIN — CEFEPIME 2 G: 2 INJECTION, POWDER, FOR SOLUTION INTRAVENOUS at 09:50

## 2019-05-04 RX ADMIN — FUROSEMIDE 20 MG: 10 INJECTION, SOLUTION INTRAMUSCULAR; INTRAVENOUS at 01:44

## 2019-05-04 RX ADMIN — Medication 10 ML: at 17:02

## 2019-05-04 RX ADMIN — POTASSIUM CHLORIDE 10 MEQ: 10 INJECTION, SOLUTION INTRAVENOUS at 02:29

## 2019-05-04 NOTE — PROGRESS NOTES
Dr. Carey Rojas notified regarding CXR result and ordered NPO and Feliz catheter for urine retention. [facility] 0603 O2 weaned down to [facility], sat 99%.

## 2019-05-04 NOTE — PROGRESS NOTES
Pharmacy Automatic Renal Dosing Protocol - Antimicrobials Indication for Antimicrobials: Sepsis, HAP, S/p Laparoscopy - lysis of adhesions  w/ SBO POA Current Regimen of Each Antimicrobial: 
Cefepime 2 gm IV q8h - started 5/3 day 2 Vancomycin consult - started  day 1 Previous Antimicrobial Therapy: 
Zosyn  surgical ppx Vancomycin trough goal level:  15-20 Date Dose & Interval Measured (mcg/mL) Extrapolated (mcg/mL) Significant Cultures:  
 BCx - ng - pending 5/3 PBCx - pending Radiology / Imaging results: (X-ray, CT scan or MRI):  
 CXR - no acute disease  CT head - No acute intracranial hemorrhage, mass or infarct. 5/3 CT abd - 1. Mild to moderate distention of proximal mid small bowel loops with transition to normal caliber distal small bowel. Diagnostic considerations include ileus and small bowel obstruction. Clinical correlation recommended. 2. Trace free peritoneal fluid. 3. Small bilateral pleural effusions. 5/3 CXR - pending Paralysis, amputations, malnutrition: none Labs: 
Recent Labs 19 
2330 19 
0322 19 
0231 CREA  --  0.90 1.34* BUN  --  14 20 WBC 9.9 10.5 7.2 Temp (24hrs), Av.1 °F (37.3 °C), Min:98 °F (36.7 °C), Max:101.2 °F (38.4 °C) Creatinine Clearance (mL/min) or Dialysis:  
Estimated Creatinine Clearance: 55.2 mL/min (based on SCr of 0.9 mg/dL). Estimated Creatinine Clearance (using IBW):50.1 mL/min Impression/Plan:  
Surgery ordered CT to assess for abscess Adjusted Cefepime to 2gm IV q12h per renal dosing protocol Vancomycin 1500mg loading dose followed by 750mg IV q12h for a projected trough at Css of 17.2 BMP already ordered for , added CBC for  Antimicrobial stop date - pending Pharmacy will follow daily and adjust medications as appropriate for renal function and/or serum levels.  
 
Thank you, 
Frieda Jackson, [facility]

## 2019-05-04 NOTE — PROGRESS NOTES
Hospitalist Progress Note NAME: Celestina Boas :  1940 MRN:  [id number] Assessment / Plan: 
Acute metabolic Encephalopathy SOB? Aspiration pneumonia  
 etiology unclear likely multifactorial, polypharmacy vs dehydration Protonix, zosyn DC  2days ago Ct scan brain unremarkable , labs wnl . No bacterial  
Pt started on seroquel , her xanax was restarted EEG : no seizures Overnight events noted , pt with increasing confusion , was found to be SOB + fever Transferred to the ICU by surgery Pt treated as aspiration pneumonia with cefepime BCx NTD , no leucocytosis 
  
Hyponatremia [employer] JOSH most likely prerenal resolved Na 130 ( down from 135)  ,  c/w encouraging PO intake , daily bmp NA wnl now  
  
Anxiety: c/w  Xanax and  Prozac  
  
SBO s/p explorative laprascopy  With lysis of adhesions, Persistent SBO  
 care per surgery Repeat CT scan : 
Mild to moderate distention of proximal mid small bowel loops with transition 
to normal caliber distal small bowel. Diagnostic considerations include ileus 
and small bowel obstruction. Clinical correlation recommended. 2. Trace free peritoneal fluid. 3. Small bilateral pleural effusions. Kept npo by surgery Body mass index is 26.38 kg/m². therefore classifying patient as overweight, NOS (body mass index [BMI] of 25 to 29.9 kg/m2) Code status: Full Prophylaxis: Lovenox Recommended Disposition: per surgery Subjective: Chief Complaint / Reason for Physician Visit FU AMS , recurrent confusion . Afebrile . denies CP , SOB , abdominal pain . Discussed with RN events overnight. Review of Systems: 
Symptom Y/N Comments  Symptom Y/N Comments Fever/Chills n   Chest Pain n   
Poor Appetite y   Edema Cough    Abdominal Pain n   
Sputum    Joint Pain SOB/ALARCON n   Pruritis/Rash Nausea/vomit n   Tolerating PT/OT Diarrhea    Tolerating Diet y NPO Constipation    Other Could NOT obtain due to: Objective: VITALS:  
Last 24hrs VS reviewed since prior progress note. Most recent are: 
Patient Vitals for the past 24 hrs: 
 Temp Pulse Resp BP SpO2  
05/04/19 1500  92 20 154/62 99 % 05/04/19 1348  90 20 149/58 99 % 05/04/19 1209 98.3 °F (36.8 °C)      
05/04/19 1100  96 18 155/67 97 % 05/04/19 0900  90 16 143/68 98 % 05/04/19 0805     98 % 05/04/19 0800  89 17 153/76 97 % 05/04/19 0718 99.1 °F (37.3 °C)      
05/04/19 0700  100 19 146/67 98 % 05/04/19 0600  94 20 140/50 99 % 05/04/19 0500  91 17 118/84 99 % 05/04/19 0400 98.2 °F (36.8 °C) 86 17 134/69 99 % 05/04/19 0300  88 18 137/56 97 % 05/04/19 0200  95 19 142/46 97 % 05/04/19 0144  94  133/61   
05/04/19 0105  (!) 102 23 102/74 96 % 05/04/19 0100 99.3 °F (37.4 °C) (!) 102 21 102/74 95 % 05/04/19 0044 99.3 °F (37.4 °C) (!) 102 24 146/63 93 % 05/04/19 0043  (!) 102 20 146/63   
05/04/19 0029 100 °F (37.8 °C) (!) 103 24 (!) 153/110 95 % 05/04/19 0023  (!) 103     
05/03/19 2335  (!) 109 24 (!) 147/99 96 % 05/03/19 2325     (!) 87 % 05/03/19 2306 (!) 101.2 °F (38.4 °C) (!) 110 24 (!) 148/117 92 % 05/03/19 2250 99.7 °F (37.6 °C) (!) 108 22 (!) 150/134 91 % 05/03/19 2002 98 °F (36.7 °C) 99 18 142/46 92 % 05/03/19 1529 98.6 °F (37 °C) 94 17 135/61 96 % Intake/Output Summary (Last 24 hours) at 5/4/2019 1508 Last data filed at 5/4/2019 1452 Gross per 24 hour Intake 1470 ml Output 3340 ml Net -1870 ml PHYSICAL EXAM: 
General: WD, WN. Alert, cooperative, no acute distress   
EENT:  EOMI. Anicteric sclerae. MMM Resp:  CTA bilaterally, no wheezing or rales. No accessory muscle use CV:  Regular  rhythm,  No edema GI:  Soft, + distended, Non tender.  no BS heard Neurologic:  Alert and oriented X 2, normal speech, Psych:   Unable to assess Skin:  No rashes. No jaundice Reviewed most current lab test results and cultures  YES 
 Reviewed most current radiology test results   YES Review and summation of old records today    NO Reviewed patient's current orders and MAR    YES 
PMH/SH reviewed - no change compared to H&P 
________________________________________________________________________ Care Plan discussed with: 
  Comments Patient y Family RN y   
Care Manager Consultant Multidiciplinary team rounds were held today with , nursing, pharmacist and clinical coordinator. Patient's plan of care was discussed; medications were reviewed and discharge planning was addressed. ________________________________________________________________________ Total NON critical care TIME:  20   Minutes Total CRITICAL CARE TIME Spent:   Minutes non procedure based Comments >50% of visit spent in counseling and coordination of care    
________________________________________________________________________ Cesar Lombardo MD  
 
Procedures: see electronic medical records for all procedures/Xrays and details which were not copied into this note but were reviewed prior to creation of Plan. LABS: 
I reviewed today's most current labs and imaging studies. Pertinent labs include: 
Recent Labs 05/04/19 
9090 05/03/19 2330 05/02/19 
9667 WBC 9.5 9.9 10.5 HGB 11.9 11.1* 12.1 HCT 35.0 32.4* 36.4  154 125* Recent Labs 05/04/19 
8290 05/03/19 
2330 05/03/19 
5190 05/02/19 
3456  132*  --  130*  
K 3.7 3.3*  --  4.0  
 98  --  101 CO2 24 24  --  22 * 94  --  173* BUN 12 11  --  14  
CREA 0.77 0.75  --  0.90  
CA 7.9* 8.3*  --  8.1*  
MG 2.0  --  1.6 1.8 ALB  --  3.0*  --   --   
TBILI  --  1.8*  --   --   
SGOT  --  46*  --   --   
ALT  --  25  --   --   
 
 
Signed: Cesar Lombardo MD

## 2019-05-04 NOTE — PROGRESS NOTES
[facility] Former[facility] named [facility], Critical Care, and Sleep Medicine Name: Chetan Mars MRN: [id number] : 1940 Hospital: [place] [address] Date: 2019 Critical Care Initial Patient Consult IMPRESSION:  
· Small bowel obstruction, s/p Ex Lap. With lysis of adhesion. · Encephalopathy, Seems to be improving · Hyponatremia · Acute renal failure · Anxiety, hx. Has been on Xanax and prozac. RECOMMENDATIONS:  
· Per IM and Surgery · Following serial labs · Sodium level improving. · No distress when seen this am.  
· On Empiric Cefepime and Vanc. · ON DVT prophylaxis. · Prn anxiety and pain meds. Subjective/History: This patient has been seen and evaluated at the request of Dr. Lola Hamilton for above. Patient is a 66 y.o. female who was moved to ICU for closer monitoring. NO acute issues this am. 
No acute issues noted. Past Medical History:  
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  
 pt denies any issues with dysphagia (food) at this time 2/15/17  GERD (gastroesophageal reflux disease)  IBS (irritable bowel syndrome)  Nausea & vomiting  Pneumonia age 8  PUD (peptic ulcer disease)  Stroke [facility])   
 2 \"mini-byers\" -as of  pt denies any residual effect  Unspecified adverse effect of anesthesia   
 sister-almost dies after surgeries. pt has never head of malignant hyperthermia and pt has never had difficulty during anesthesia Past Surgical History:  
Procedure Laterality Date  COLONOSCOPY N/A 2016 COLONOSCOPY performed by Scott Fontana MD at hospitals ENDOSCOPY  
 HX BREAST BIOPSY Bilateral   
 benign  HX HYSTERECTOMY  HX LAP CHOLECYSTECTOMY  HX SALPINGO-OOPHORECTOMY \"left one ovary\"  HX SHOULDER ARTHROSCOPY Left  HX TONSILLECTOMY  HX TUBAL LIGATION    
  AL COLONOSCOPY FLX DX W/COLLJ SPEC WHEN PFRMD  2/21/2011 Prior to Admission medications Medication Sig Start Date End Date Taking? Authorizing Provider  
clemastine 2.68 mg tab tablet Take 1.34 mg by mouth daily as needed (Allergies). Yes Provider, Historical  
raNITIdine (ZANTAC) 150 mg tablet Take 150 mg by mouth daily as needed for Indigestion (acid reflux). Yes Provider, Historical  
polysorbate 80/glycerin (REFRESH DRY EYE THERAPY OP) Apply 2 Drops to eye daily. Yes Provider, Historical  
vitamin E (AQUA GEMS) 400 unit capsule Take 400 Units by mouth daily. Yes Provider, Historical  
ALPRAZolam (XANAX) 0.5 mg tablet Take 0.5 mg by mouth two (2) times a day. Yes Provider, Historical  
meclizine (ANTIVERT) 25 mg tablet Take 25 mg by mouth three (3) times daily as needed for Dizziness (vertigo). Indications: sensation of spinning or whirling   Yes Provider, Historical  
vit C/E/Zn/coppr/lutein/zeaxan (PRESERVISION AREDS 2 PO) Take 40 mg by mouth daily. Indications: eye lubrication   Yes Provider, Historical  
fluoxetine (PROZAC) 40 mg capsule Take 40 mg by mouth every morning. 2/18/11  Yes Provider, Historical  
aspirin 81 mg tablet Take 81 mg by mouth every morning. 2/18/11  Yes Provider, Historical  
CALCIUM CARBONATE/VITAMIN D3 (CALTRATE-600 PLUS VITAMIN D3 PO) Take 1 Tab by mouth every morning. 2/18/11  Yes Provider, Historical  
 
Current Facility-Administered Medications Medication Dose Route Frequency  vancomycin (VANCOCIN) 750 mg in 0.9% sodium chloride (MBP/ADV) 250 mL  750 mg IntraVENous Q12H  cefepime (MAXIPIME) 2 g in 0.9% sodium chloride (MBP/ADV) 100 mL  2 g IntraVENous Q8H  
 QUEtiapine (SEROquel) tablet 25 mg  25 mg Oral QHS  sodium chloride (NS) flush 5-40 mL  5-40 mL IntraVENous Q8H  
 scopolamine (TRANSDERM-SCOP) 1 mg over 3 days 1 Patch  1 Patch TransDERmal Q72H  enoxaparin (LOVENOX) injection 40 mg  40 mg SubCUTAneous DAILY  FLUoxetine (PROzac) 20 mg/5 mL (4 mg/mL) oral solution 20 mg  20 mg Oral DAILY Allergies Allergen Reactions  Demerol [Meperidine] Nausea and Vomiting  Codeine Not Reported This Time  Penicillins Rash and Itching Has tolerated zosyn Social History Tobacco Use  Smoking status: Never Smoker  Smokeless tobacco: Never Used Substance Use Topics  Alcohol use: No  
  
History reviewed. No pertinent family history. Review of Systems: 
Review of systems not obtained due to patient factors. Objective: 
Vital Signs:   
Visit Vitals /68 Pulse 90 Temp 99.1 °F (37.3 °C) Resp 16 Ht 5' 7\" (1.702 m) Wt 76.4 kg (168 lb 6.9 oz) SpO2 98% BMI 26.38 kg/m² O2 Device: Nasal cannula O2 Flow Rate (L/min): 2 l/min Temp (24hrs), Av.3 °F (37.4 °C), Min:98 °F (36.7 °C), Max:101.2 °F (38.4 °C) Intake/Output:  
Last shift:      701 -  190 In: -  
Out: 700 [Urine:700] Last 3 shifts:  190 -  0700 In: 7634 [P.O.:660; I.V.:800] Out: Suleiman Castorena [[id number]] Intake/Output Summary (Last 24 hours) at 2019 1001 Last data filed at 2019 3579 Gross per 24 hour Intake 1340 ml Output 3310 ml Net -1970 ml Hemodynamics:  
PAP:   CO:    
Wedge:   CI:    
CVP:    SVR:    
  PVR:    
 
Ventilator Settings: 
Mode Rate Tidal Volume Pressure FiO2 PEEP  
         31 % Peak airway pressure:     
Minute ventilation:     
 
Physical Exam: 
 
General:  Sleepy, arousable, ox 3. cooperative, no distress, appears stated age. Head:  Normocephalic, without obvious abnormality, atraumatic. Eyes:  Conjunctivae/corneas clear. PERRL, EOMs intact. Nose: Nares normal. Septum midline. Mucosa normal. No drainage or sinus tenderness. Throat: Lips, mucosa, and tongue normal. Teeth and gums normal.  
Neck: Supple, symmetrical, trachea midline, no adenopathy, thyroid: no enlargment/tenderness/nodules, no carotid bruit and no JVD. Back:   Symmetric, no curvature. ROM normal.  
Lungs:   Clear to auscultation bilaterally. Chest wall:  No tenderness or deformity. Heart:  Regular rate and rhythm, S1, S2 normal, no murmur, click, rub or gallop. Abdomen:   Soft, distended, incsions with dressing in place. Extremities: Extremities normal, atraumatic, no cyanosis or edema. Pulses: 2+ and symmetric all extremities. Skin: Skin color, texture, turgor normal. No rashes or lesions Lymph nodes: Cervical, supraclavicular, and axillary nodes normal.  
Neurologic: Grossly nonfocal, motor seems to be intact. Psych: no overt anxiety or depression. Data:  
 
Recent Results (from the past 24 hour(s)) GLUCOSE, POC Collection Time: 05/03/19 10:54 PM  
Result Value Ref Range Glucose (POC) 109 (H) 65 - 100 mg/dL Performed by Isiah Bardales CBC W/O DIFF Collection Time: 05/03/19 11:30 PM  
Result Value Ref Range WBC 9.9 3.6 - 11.0 K/uL  
 RBC 3.73 (L) 3.80 - 5.20 M/uL  
 HGB 11.1 (L) 11.5 - 16.0 g/dL HCT 32.4 (L) 35.0 - 47.0 % MCV 86.9 80.0 - 99.0 FL  
 MCH 29.8 26.0 - 34.0 PG  
 MCHC 34.3 30.0 - 36.5 g/dL  
 RDW 13.2 11.5 - 14.5 % PLATELET 969 688 - 623 K/uL MPV 10.9 8.9 - 12.9 FL  
 NRBC 0.0 0  WBC ABSOLUTE NRBC 0.00 0.00 - 0.01 K/uL METABOLIC PANEL, COMPREHENSIVE Collection Time: 05/03/19 11:30 PM  
Result Value Ref Range Sodium 132 (L) 136 - 145 mmol/L Potassium 3.3 (L) 3.5 - 5.1 mmol/L Chloride 98 97 - 108 mmol/L  
 CO2 24 21 - 32 mmol/L Anion gap 10 5 - 15 mmol/L Glucose 94 65 - 100 mg/dL BUN 11 6 - 20 MG/DL Creatinine 0.75 0.55 - 1.02 MG/DL  
 BUN/Creatinine ratio 15 12 - 20 GFR est AA >60 >60 ml/min/1.73m2 GFR est non-AA >60 >60 ml/min/1.73m2 Calcium 8.3 (L) 8.5 - 10.1 MG/DL Bilirubin, total 1.8 (H) 0.2 - 1.0 MG/DL  
 ALT (SGPT) 25 12 - 78 U/L  
 AST (SGOT) 46 (H) 15 - 37 U/L Alk. phosphatase 71 45 - 117 U/L Protein, total 6.6 6.4 - 8.2 g/dL Albumin 3.0 (L) 3.5 - 5.0 g/dL Globulin 3.6 2.0 - 4.0 g/dL A-G Ratio 0.8 (L) 1.1 - 2.2 LACTIC ACID Collection Time: 05/03/19 11:30 PM  
Result Value Ref Range Lactic acid 1.0 0.4 - 2.0 MMOL/L  
POC G3 - PUL Collection Time: 05/03/19 11:37 PM  
Result Value Ref Range FIO2 (POC) 40 % pH (POC) 7.462 (H) 7.35 - 7.45    
 pCO2 (POC) 30.3 (L) 35.0 - 45.0 MMHG  
 pO2 (POC) 97 80 - 100 MMHG  
 HCO3 (POC) 21.7 (L) 22 - 26 MMOL/L  
 sO2 (POC) 98 (H) 92 - 97 % Base deficit (POC) 2 mmol/L Site RIGHT BRACHIAL Device: VENTURI MASK Allens test (POC) N/A Specimen type (POC) ARTERIAL Total resp. rate 24 CULTURE, BLOOD, PAIRED Collection Time: 05/03/19 11:51 PM  
Result Value Ref Range Special Requests: NO SPECIAL REQUESTS Culture result: NO GROWTH AFTER 9 HOURS    
URINALYSIS W/ REFLEX CULTURE Collection Time: 05/04/19 12:07 AM  
Result Value Ref Range Color DARK YELLOW Appearance TURBID (A) CLEAR Specific gravity 1.010 1.003 - 1.030    
 pH (UA) 6.0 5.0 - 8.0 Protein 100 (A) NEG mg/dL Glucose NEGATIVE  NEG mg/dL Ketone 15 (A) NEG mg/dL Blood MODERATE (A) NEG Urobilinogen 0.2 0.2 - 1.0 EU/dL Nitrites NEGATIVE  NEG Leukocyte Esterase NEGATIVE  NEG    
 WBC 0-4 0 - 4 /hpf  
 RBC 0-5 0 - 5 /hpf Epithelial cells FEW FEW /lpf Bacteria NEGATIVE  NEG /hpf  
 UA:UC IF INDICATED CULTURE NOT INDICATED BY UA RESULT CNI Mucus 1+ (A) NEG /lpf Amorphous Crystals 1+ (A) NEG  
BILIRUBIN, CONFIRM Collection Time: 05/04/19 12:07 AM  
Result Value Ref Range Bilirubin UA, confirm NEGATIVE  NEG    
METABOLIC PANEL, BASIC Collection Time: 05/04/19  6:35 AM  
Result Value Ref Range Sodium 136 136 - 145 mmol/L Potassium 3.7 3.5 - 5.1 mmol/L Chloride 101 97 - 108 mmol/L  
 CO2 24 21 - 32 mmol/L Anion gap 11 5 - 15 mmol/L Glucose 117 (H) 65 - 100 mg/dL BUN 12 6 - 20 MG/DL  Creatinine 0.77 0.55 - 1.02 MG/DL  
 BUN/Creatinine ratio 16 12 - 20 GFR est AA >60 >60 ml/min/1.73m2 GFR est non-AA >60 >60 ml/min/1.73m2 Calcium 7.9 (L) 8.5 - 10.1 MG/DL MAGNESIUM Collection Time: 05/04/19  6:35 AM  
Result Value Ref Range Magnesium 2.0 1.6 - 2.4 mg/dL CBC WITH AUTOMATED DIFF Collection Time: 05/04/19  6:35 AM  
Result Value Ref Range WBC 9.5 3.6 - 11.0 K/uL  
 RBC 4.05 3.80 - 5.20 M/uL  
 HGB 11.9 11.5 - 16.0 g/dL HCT 35.0 35.0 - 47.0 % MCV 86.4 80.0 - 99.0 FL  
 MCH 29.4 26.0 - 34.0 PG  
 MCHC 34.0 30.0 - 36.5 g/dL  
 RDW 13.2 11.5 - 14.5 % PLATELET 352 956 - 482 K/uL MPV 10.8 8.9 - 12.9 FL  
 NRBC 0.0 0  WBC ABSOLUTE NRBC 0.00 0.00 - 0.01 K/uL NEUTROPHILS 79 (H) 32 - 75 % LYMPHOCYTES 8 (L) 12 - 49 % MONOCYTES 11 5 - 13 % EOSINOPHILS 1 0 - 7 % BASOPHILS 0 0 - 1 % IMMATURE GRANULOCYTES 1 (H) 0.0 - 0.5 % ABS. NEUTROPHILS 7.5 1.8 - 8.0 K/UL  
 ABS. LYMPHOCYTES 0.8 0.8 - 3.5 K/UL  
 ABS. MONOCYTES 1.0 0.0 - 1.0 K/UL  
 ABS. EOSINOPHILS 0.1 0.0 - 0.4 K/UL  
 ABS. BASOPHILS 0.0 0.0 - 0.1 K/UL  
 ABS. IMM. GRANS. 0.1 (H) 0.00 - 0.04 K/UL  
 DF AUTOMATED    
 RBC COMMENTS NORMOCYTIC, NORMOCHROMIC Telemetry:normal sinus rhythm Imaging: 
I have personally reviewed the patients radiographs and have reviewed the reports: 
5-3-19: IMPRESSION: 
  
1. Pulmonary vascular congestion, bibasilar atelectasis and small pleural 
effusions. 2. Significant gaseous distention of the stomach partly visualized.   
 
 
Kian Overton MD

## 2019-05-04 NOTE — PROGRESS NOTES
SURGERY PROGRESS NOTE Admit Date: 2019 POD 4 Days Post-Op Procedure: Procedure(s): LAPAROSCOPY GENERAL DIAGNOSTIC LYSIS OF ADHESIONS Subjective:  
 
Patient denies pain and nausea. Objective:  
 
Visit Vitals /67 (BP 1 Location: Left arm, BP Patient Position: At rest) Pulse 96 Temp 98.3 °F (36.8 °C) Resp 18 Ht 5' 7\" (1.702 m) Wt 76.4 kg (168 lb 6.9 oz) SpO2 97% BMI 26.38 kg/m² Temp (24hrs), Av.2 °F (37.3 °C), Min:98 °F (36.7 °C), Max:101.2 °F (38.4 °C) 
 
 
701 -  190 In: 100 [I.V.:100] Out: 960 [Urine:960] 1901 -  0700 In: 2013 [P.O.:660; I.V.:800] Out: Charity Christie [[id number]] Physical Exam:   
General:  alert, no distress, appears stated age, confused Abdomen: soft, bowel sounds active, non-tender, distended Incision:   healing well, no drainage, no erythema, no hernia, no seroma, no swelling, no dehiscence, incision well approximated Lab Results Component Value Date/Time WBC 9.5 2019 06:35 AM  
 HGB 11.9 2019 06:35 AM  
 HCT 35.0 2019 06:35 AM  
 PLATELET 446 10//0230 06:35 AM  
 MCV 86.4 2019 06:35 AM  
 
Lab Results Component Value Date/Time GFR est non-AA >60 2019 06:35 AM  
 GFR est AA >60 2019 06:35 AM  
 Creatinine 0.77 2019 06:35 AM  
 BUN 12 2019 06:35 AM  
 Sodium 136 2019 06:35 AM  
 Potassium 3.7 2019 06:35 AM  
 Chloride 101 2019 06:35 AM  
 CO2 24 2019 06:35 AM  
 Magnesium 2.0 2019 06:35 AM  
 Phosphorus 3.0 2019 02:44 AM  
 
 
Assessment:  
 
Active Problems: 
  SBO (small bowel obstruction) ([facility] [address] (2019) Still with ileus pattern. Etiology unclear. Given KUB with gastric distention, I recommend NG but, patient states over and over she can not have NG due to her esophageal problem. Will just have to continue with bowel rest for now. Plan:  
 
 
Continue present treatment

## 2019-05-04 NOTE — PROGRESS NOTES
05/03/19 2250 Vital Signs Temp 99.7 °F (37.6 °C) Temp Source Axillary Pulse (Heart Rate) (!) 108 Heart Rate Source Monitor Cardiac Rhythm Sinus Tach Resp Rate 22  
O2 Sat (%) 91 % Level of Consciousness Alert  
BP (!) 150/134 (REPEATED )  
MAP (Calculated) 139 MEWS Score 3 Patient with MEWS 3; Rapid Response NurseAltagracia, notified for change in patient status.

## 2019-05-04 NOTE — PROGRESS NOTES
Report received from Steffanie Prieto 
1000: Patient stating that PIV in left wrist burns, PIV removed. Attempted another PIV with no success. Will attempt later. 1106: Spoke with surgeon regarding patient's night. X rays/ CT reviewed. Orders for KUB/ and NG tube insertion. MD spoke with patient, patient refusing NG tube at this time. MD stated to hold  NG tube and to see what KUB comes back with. Will continue to monitor. 1230 :Patient right arm has become swollen. IV removed, arm elevated, and warm compress applied. New PIV placed in left AC 20 G.  
1530 :Patient attempting to have BM, says she feels\" sharp pains\" intermittently and feels like she has to go. Patient placed on bed pan. 1615: Bed pan removed, no success with bed pan. Patient informed to let nurse know when she needed to use bed pan again. Will continue to monitor. 1916: Report given to Steffanie Prieto.

## 2019-05-04 NOTE — PROGRESS NOTES
10:45 PM Charge RN, Matt Graham, to patient's room to change telemetry battery; Upon entering patient's room, patient was lethargic, and hot to the touch; Unsure of patient's baseline, Primary RN, Gudelia Weiss, was called to patient's room, who confirmed that patient was not like this earlier. 10:50 PM Rapid Response Nurse, Altagracia, notified and requested to come to bedside to visualize patient. EKG, lab work (to include lactic acid, blood cultures, and urine) sent, , Chest Xray, Blood gases; Rapid Response called overheard per MD and MD came to bedside to assess patient; Ultimately orders were received to transfer patient to a higher level of care; Patient is transferring to CCU as PCU Overflow to room 2526. Primary RN, Gudelia Weiss, called report and family has been notified of transfer. Please see note from Rapid Response Nurse, 154Heidi Perez.  
 
Shilpa Boyer RN 
05/04/19 
12:25 AM

## 2019-05-04 NOTE — PROGRESS NOTES
Patient is being transferred to PCU overflow based on need for higher level of care and change in patient status. . Report given to [employer]. Patient has been seen by rapid response team and M.D. Tests are pending. Patients daughter, Lisa Easton was notified at [facility]

## 2019-05-04 NOTE — PROGRESS NOTES
TRANSFER - IN REPORT: 
 
Verbal report received from Mai Obando RN(name) on Kaz Sanchez  being received from Upplication) for routine progression of care Report consisted of patients Situation, Background, Assessment and  
Recommendations(SBAR). Information from the following report(s) SBAR, Kardex, OR Summary, Intake/Output, Med Rec Status, Cardiac Rhythm SR/ST and Alarm Parameters  was reviewed with the receiving nurse. Opportunity for questions and clarification was provided. Assessment completed upon patients arrival to unit and care assumed.

## 2019-05-04 NOTE — PROGRESS NOTES
RAPID RESPONSE TEAM 
 
2300  Rounded on patient due to staff concern for change in clinical presentation. According to primary RN, patient is now febrile, tachycardic, hypertensive, hypoxic, and appears less responsive. On assessment, patient is alert and restless. She appears to be experiencing some involuntary myoclonic jerks in the lower extremities. Pupils are size 5 and reactive to light. Patient is able to state her name, but does not answer any other questions. She follows some commands, but not consistently. Patient denies pain, but does nod 'yes' to being short of breath. Patient has been confused and restless, this is unchanged. Work of breathing is visibly labored. Patient had been on room air, was placed on 2L NC, and is now on 40% venti mask to maintain oxygen saturation > 92%. Lung sounds are significantly diminished, some coarseness audible in LLL. Patient is hot to touch, no signs of edema. Vitals: 101.2, 110 HR, 22 RR, 92%,  148/117. . Protocol orders placed for CXR, ABG's, CBC, BMP, lactic acid, and EKG. 2314  Patient is unsafe for PO intake, Tylenol order changed to suppository. Administered for fever. Ice packs placed under bilateral axillae. 2330  Rapid response called. 1  Spoke with  and updated on patient condition. MD agreeable with already ordered/performed tests. Per MD, CXR appears to show new LLL pneumonia. MD to enter orders for blood cultures and antibiotics. 2685   at bedside to evaluate patient. .  Received verbal orders to transfer patient to stepdown. MD also ok'd straight cath for UA. 
 
0000   notified of aforementioned events via secure [employer] messaging. MD acknowledged notification. Discussed with primary RN Cornelius Watson and updated on plan of care. 0030  Spoke with  to update on lab and CXR results. Received orders for Lasix 20 mg and 4 runs of Potassium. 0040  Patient transferred to [address] [place] [address] and care handed over to primary RN Genny Morales. Sachi Austin Rapid Response BERTHA Richards

## 2019-05-05 ENCOUNTER — HOME HEALTH ADMISSION (OUTPATIENT)
Dept: HOME HEALTH SERVICES | Facility: HOME HEALTH | Age: 79
End: 2019-05-05

## 2019-05-05 LAB — MAGNESIUM SERPL-MCNC: 2 MG/DL (ref 1.6–2.4)

## 2019-05-05 PROCEDURE — 36415 COLL VENOUS BLD VENIPUNCTURE: CPT

## 2019-05-05 PROCEDURE — 74011250636 HC RX REV CODE- 250/636: Performed by: SURGERY

## 2019-05-05 PROCEDURE — 65610000006 HC RM INTENSIVE CARE

## 2019-05-05 PROCEDURE — 74011250636 HC RX REV CODE- 250/636: Performed by: HOSPITALIST

## 2019-05-05 PROCEDURE — 74011000258 HC RX REV CODE- 258: Performed by: SURGERY

## 2019-05-05 PROCEDURE — 83735 ASSAY OF MAGNESIUM: CPT

## 2019-05-05 PROCEDURE — 74011250637 HC RX REV CODE- 250/637: Performed by: SURGERY

## 2019-05-05 PROCEDURE — 77010033678 HC OXYGEN DAILY

## 2019-05-05 PROCEDURE — 74011250637 HC RX REV CODE- 250/637: Performed by: INTERNAL MEDICINE

## 2019-05-05 RX ORDER — GUAIFENESIN 100 MG/5ML
200 SOLUTION ORAL
Status: DISCONTINUED | OUTPATIENT
Start: 2019-05-05 | End: 2019-05-10 | Stop reason: HOSPADM

## 2019-05-05 RX ADMIN — Medication 10 ML: at 22:00

## 2019-05-05 RX ADMIN — ENOXAPARIN SODIUM 40 MG: 40 INJECTION SUBCUTANEOUS at 08:08

## 2019-05-05 RX ADMIN — CEFEPIME 2 G: 2 INJECTION, POWDER, FOR SOLUTION INTRAVENOUS at 17:28

## 2019-05-05 RX ADMIN — Medication 10 ML: at 06:06

## 2019-05-05 RX ADMIN — CEFEPIME 2 G: 2 INJECTION, POWDER, FOR SOLUTION INTRAVENOUS at 09:20

## 2019-05-05 RX ADMIN — CEFEPIME 2 G: 2 INJECTION, POWDER, FOR SOLUTION INTRAVENOUS at 04:15

## 2019-05-05 RX ADMIN — VANCOMYCIN HYDROCHLORIDE 750 MG: 750 INJECTION, POWDER, LYOPHILIZED, FOR SOLUTION INTRAVENOUS at 01:44

## 2019-05-05 RX ADMIN — ALPRAZOLAM 0.5 MG: 0.5 TABLET ORAL at 22:26

## 2019-05-05 RX ADMIN — Medication 10 ML: at 14:27

## 2019-05-05 RX ADMIN — VANCOMYCIN HYDROCHLORIDE 750 MG: 750 INJECTION, POWDER, LYOPHILIZED, FOR SOLUTION INTRAVENOUS at 12:28

## 2019-05-05 RX ADMIN — QUETIAPINE FUMARATE 25 MG: 25 TABLET ORAL at 21:47

## 2019-05-05 NOTE — PROGRESS NOTES
MAR:  Plan A:   HH with [facility]/FOC signed/referral sent Plan B:   [facility]: Referral sent & accepted Daughter will transport home if she goes home, may need transport if needs SNF. Daughter has appointment for herself tomorrow (5/6/19) and will be unavailable for a few hours. Please call her home phone and leave message if there is an update or need ([phone]). CM will continue to assist with MAR planning as needed. Antione Vitale RN, CHPN, [facility]

## 2019-05-05 NOTE — PROGRESS NOTES
Hospitalist Progress Note NAME: Kyle Rosario :  1940 MRN:  [id number] Assessment / Plan: 
Acute metabolic Encephalopathy resolved SOB? Aspiration pneumonia  
 etiology unclear likely multifactorial, polypharmacy vs dehydration Protonix, zosyn DC  2days ago Ct scan brain unremarkable , labs wnl . No bacterial  
Pt started on seroquel , her xanax was restarted then stopped , now is on xanax prn 
EEG : no seizures Overnight events noted , pt with increasing confusion , was found to be SOB + fever Transferred to the ICU by surgery Pt treated as aspiration pneumonia with cefepime Sachi Cramp 
BCx NTD , no leucocytosis 
 will check MRSA , DC vanco if MRSA neg . No sputum sample available On chest xray : no pneumonia Awaiting to be transferred out to the floors Hyponatremia Donnald Pelt JOSH most likely prerenal resolved Na 130 ( down from 135)  ,  c/w encouraging PO intake , daily bmp NA wnl now  
  
Anxiety: c/w  Xanax and  Prozac  
  
SBO s/p explorative laprascopy  With lysis of adhesions, Persistent SBO  
 care per surgery Repeat CT scan : 
Mild to moderate distention of proximal mid small bowel loops with transition 
to normal caliber distal small bowel. Diagnostic considerations include ileus 
and small bowel obstruction. Clinical correlation recommended. 2. Trace free peritoneal fluid. 3. Small bilateral pleural effusions. Now able to tolerate clears Body mass index is 26.24 kg/m². therefore classifying patient as overweight, NOS (body mass index [BMI] of 25 to 29.9 kg/m2) Code status: Full Prophylaxis: Lovenox Recommended Disposition: per surgery Subjective: Chief Complaint / Reason for Physician Visit FU AMS / aspiration pneumonia? Curt Uribe Afebrile . denies CP , SOB , abdominal pain . More AAOx4 Discussed with RN events overnight. Review of Systems: 
Symptom Y/N Comments  Symptom Y/N Comments Fever/Chills n   Chest Pain n   
Poor Appetite y   Edema Cough    Abdominal Pain n   
Sputum    Joint Pain SOB/ALARCON n   Pruritis/Rash Nausea/vomit n   Tolerating PT/OT Diarrhea    Tolerating Diet y CLD Constipation    Other Could NOT obtain due to:   
 
Objective: VITALS:  
Last 24hrs VS reviewed since prior progress note. Most recent are: 
Patient Vitals for the past 24 hrs: 
 Temp Pulse Resp BP SpO2  
05/05/19 1400  87 16  95 % 05/05/19 1356  92 21 141/45 94 % 05/05/19 1300  93 20  95 % 05/05/19 1200  89 22  94 % 05/05/19 1149 98.7 °F (37.1 °C) 90 20 129/69 95 % 05/05/19 1100  89 22  94 % 05/05/19 1000  91 20  95 % 05/05/19 0941  95 20 147/63 94 % 05/05/19 0900  95 21  95 % 05/05/19 0800  (!) 103 23  97 % 05/05/19 0740 97.6 °F (36.4 °C) (!) 102 22 168/85 98 % 05/05/19 0400 98.2 °F (36.8 °C) 93 19 151/62 98 % 05/05/19 0000 98.4 °F (36.9 °C) 94 21 148/58 97 % 05/04/19 2000 98.4 °F (36.9 °C) 95 21 150/65 96 % 05/04/19 1900  95 24 160/61 98 % 05/04/19 1700  95 21 151/59 98 % 05/04/19 1619 98.4 °F (36.9 °C)     Intake/Output Summary (Last 24 hours) at 5/5/2019 1533 Last data filed at 5/5/2019 1228 Gross per 24 hour Intake 1120 ml Output 1058 ml Net 62 ml PHYSICAL EXAM: 
General: WD, WN. Alert, cooperative, no acute distress   
EENT:  EOMI. Anicteric sclerae. MMM Resp:  CTA bilaterally, no wheezing or rales. No accessory muscle use CV:  Regular  rhythm,  No edema GI:  Soft, + distended, Non tender.  no BS heard Neurologic:  Alert and oriented X 4, normal speech, Psych:   calm Skin:  No rashes. No jaundice Reviewed most current lab test results and cultures  YES Reviewed most current radiology test results   YES Review and summation of old records today    NO Reviewed patient's current orders and MAR    YES 
PMH/SH reviewed - no change compared to H&P 
________________________________________________________________________ Care Plan discussed with: 
  Comments Patient y Family RN y   
Care Manager Consultant Multidiciplinary team rounds were held today with , nursing, pharmacist and clinical coordinator. Patient's plan of care was discussed; medications were reviewed and discharge planning was addressed. ________________________________________________________________________ Total NON critical care TIME:  20   Minutes Total CRITICAL CARE TIME Spent:   Minutes non procedure based Comments >50% of visit spent in counseling and coordination of care    
________________________________________________________________________ Dee Estevez MD  
 
Procedures: see electronic medical records for all procedures/Xrays and details which were not copied into this note but were reviewed prior to creation of Plan. LABS: 
I reviewed today's most current labs and imaging studies. Pertinent labs include: 
Recent Labs 05/04/19 
5646 05/03/19 
2330 WBC 9.5 9.9 HGB 11.9 11.1*  
HCT 35.0 32.4*  
 154 Recent Labs 05/05/19 
7438 05/04/19 
5230 05/03/19 
2330 05/03/19 
5732 NA  --  136 132*  --   
K  --  3.7 3.3*  --   
CL  --  101 98  --   
CO2  --  24 24  --   
GLU  --  117* 94  --   
BUN  --  12 11  --   
CREA  --  0.77 0.75  --   
CA  --  7.9* 8.3*  --   
MG 2.0 2.0  --  1.6 ALB  --   --  3.0*  --   
TBILI  --   --  1.8*  --   
SGOT  --   --  46*  --   
ALT  --   --  25  --   
 
 
Signed: Dee Estevez MD

## 2019-05-05 NOTE — PROGRESS NOTES
Admit Date: 2019 POD 5 Days Post-Op Procedure:  Procedure(s): LAPAROSCOPY GENERAL DIAGNOSTIC LYSIS OF ADHESIONS Subjective:  
 
Patient has no complaints. + flatus. States she is about to have a BM. No nausea or abdominal pain. Objective:  
 
Blood pressure 168/85, pulse (!) 102, temperature 98.2 °F (36.8 °C), resp. rate 22, height 5' 7\" (1.702 m), weight 167 lb 8.8 oz (76 kg), SpO2 98 %. Temp (24hrs), Av.3 °F (36.8 °C), Min:98.2 °F (36.8 °C), Max:98.4 °F (36.9 °C) Physical Exam:  GENERAL: alert, cooperative, no distress, appears stated age, LUNG: clear to auscultation bilaterally, HEART: regular rate and rhythm, ABDOMEN: soft, non-tender. Bowel sounds normal. Wounds c/d/i. EXTREMITIES:  extremities normal, atraumatic, no cyanosis or edema Labs:  
Recent Results (from the past 24 hour(s)) MAGNESIUM Collection Time: 19  4:28 AM  
Result Value Ref Range Magnesium 2.0 1.6 - 2.4 mg/dL Data Review images and reports reviewed Assessment:  
 
Active Problems: 
  SBO (small bowel obstruction) ([address] (2019) Plan/Recommendations/Medical Decision Making:  
 
Continue present treatment Clear liquids, advance as viry. Presumed aspiration pneumonia, appreciate pulmonary assistance. Okay to transfer out of ICU today. Repeat labs tomorrow. Lenin Sybil. Bhavana Hinds MD, [address] [facility]

## 2019-05-05 NOTE — ROUTINE PROCESS
Bedside and Verbal shift change report received from BERTHA Gant (offgoing nurse). Report included the following information SBAR, Kardex, ED Summary, Procedure Summary, Intake/Output, MAR, Accordion, Recent Results, Med Rec Status, Cardiac Rhythm NSR, Alarm Parameters , Procedure Verification and Quality Measures. 0840:She utilized the bedpan to defecate, otherwise, planning to initiate clear liquids. 1000: She is tolerating the clear liquids well. Although she has a non productive cough, there are no signs or symptoms of aspiration. 1200:Her daughter is in at the bedside. She denies abdominal or chest discomfort. She continues to be continent of a moderate amount of soft brown stool. 1400: Full bath completed with linen changes, no skin breakdown noted. 1600:Her assessment is unchanged, she is accepting clear liquids well without c/o nausea or regurgitation. 1800:Resting at long intervals with her eyes closed. 1930:Noted talking on the telephone. Otherwise, her status is unchanged. Bedside and Verbal shift change report given to MAXWELL Gudino RN (oncoming nurse) by myself (offgoing nurse). Report included the following information SBAR, Kardex, ED Summary, Procedure Summary, Intake/Output, MAR, Accordion, Recent Results, Med Rec Status, Cardiac Rhythm NSR, Alarm Parameters  and Quality Measures.

## 2019-05-05 NOTE — PROGRESS NOTES
Pharmacy Automatic Renal Dosing Protocol - Antimicrobials Indication for Antimicrobials: Sepsis, HAP, S/p Laparoscopy - lysis of adhesions  w/ SBO POA, per MD note \"Presumed aspiration pneumonia\" Current Regimen of Each Antimicrobial: 
Cefepime 2 gm IV q8h - started 5/3 day 3 Vancomycin consult - started  day 2 Previous Antimicrobial Therapy: 
Zosyn  surgical ppx Vancomycin trough goal level:  15-20 Date Dose & Interval Measured (mcg/mL) Extrapolated (mcg/mL) Significant Cultures:  
 BCx - NG  - pending 5/3 BCx - NG pending Radiology / Imaging results: (X-ray, CT scan or MRI):  
 CXR - no acute disease  CT head - No acute intracranial hemorrhage, mass or infarct. 5/3 CT abd - 1. Mild to moderate distention of proximal mid small bowel loops with transition to normal caliber distal small bowel. Diagnostic considerations include ileus and small bowel obstruction. Clinical correlation recommended. 2. Trace free peritoneal fluid. 3. Small bilateral pleural effusions. 5/3 CXR - 1. Pulmonary vascular congestion, bibasilar atelectasis and small pleural effusions. 2. Significant gaseous distention of the stomach partly visualized. Paralysis, amputations, malnutrition: none Labs: 
Recent Labs 19 
3279 19 
2330 CREA 0.77 0.75 BUN 12 11 WBC 9.5 9.9 Temp (24hrs), Av.3 °F (36.8 °C), Min:98.2 °F (36.8 °C), Max:98.4 °F (36.9 °C) Creatinine Clearance (mL/min) or Dialysis:  
Estimated Creatinine Clearance: 64.1 mL/min (based on SCr of 0.77 mg/dL). Estimated Creatinine Clearance (using IBW):58.6 mL/min Impression/Plan:  
Scr stable WBC stable Afebrile Continue with Cefepime 2gm IV q8h Vancomycin 1500mg loading dose followed by 750mg IV q12h for a projected trough at Css of 17.2 Vancomycin trough at midnight Antimicrobial stop date - pending Pharmacy will follow daily and adjust medications as appropriate for renal function and/or serum levels. Thank you, Golden Holloway, PHARMD

## 2019-05-05 NOTE — PROGRESS NOTES
[facility] Name: Leighann Michaud MRN: [id number] : 1940 Hospital: [facility] Date: 2019 Critical Care  Patient Consult IMPRESSION:  
· Small bowel obstruction, s/p Ex Lap. With lysis of adhesion. · Encephalopathy, seems back to normal.  
· Hyponatremia · Acute renal failure · Anxiety, hx. Has been on Xanax and prozac. RECOMMENDATIONS:  
· Per IM and Surgery · Following serial labs · Sodium level improving. · On Empiric Cefepime and Vanc. · ON DVT prophylaxis. · Prn anxiety and pain meds. Subjective/History: This patient has been seen and evaluated at the request of Dr. Dmitry Robb for above. Patient is a 66 y.o. female who was moved to ICU for closer monitoring. NO acute issues this am. 
No acute issues noted. 19:  
Pt has not acute complaints when seen this am.  
Conversant. Does have a dry cough. NO chest pain, no back pain, has expected abdominal pain. Past Medical History:  
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  
 pt denies any issues with dysphagia (food) at this time 2/15/17  GERD (gastroesophageal reflux disease)  IBS (irritable bowel syndrome)  Nausea & vomiting  Pneumonia age 8  PUD (peptic ulcer disease)  Stroke [facility])   
 2 \"mini-byers\" -as of  pt denies any residual effect  Unspecified adverse effect of anesthesia   
 sister-almost dies after surgeries. pt has never head of malignant hyperthermia and pt has never had difficulty during anesthesia Past Surgical History:  
Procedure Laterality Date  COLONOSCOPY N/A 2016 COLONOSCOPY performed by Basilio Lim MD at [facility] ENDOSCOPY  
 HX BREAST BIOPSY Bilateral   
 benign  HX HYSTERECTOMY  HX LAP CHOLECYSTECTOMY  HX SALPINGO-OOPHORECTOMY \"left one ovary\"  HX SHOULDER ARTHROSCOPY Left  HX TONSILLECTOMY  HX TUBAL LIGATION    
 [place] COLONOSCOPY FLX DX W/COLLJ SPEC WHEN PFRMD  2/21/2011 Prior to Admission medications Medication Sig Start Date End Date Taking? Authorizing Provider  
clemastine 2.68 mg tab tablet Take 1.34 mg by mouth daily as needed (Allergies). Yes Provider, Historical  
raNITIdine (ZANTAC) 150 mg tablet Take 150 mg by mouth daily as needed for Indigestion (acid reflux). Yes Provider, Historical  
polysorbate 80/glycerin (REFRESH DRY EYE THERAPY OP) Apply 2 Drops to eye daily. Yes Provider, Historical  
vitamin E (AQUA GEMS) 400 unit capsule Take 400 Units by mouth daily. Yes Provider, Historical  
ALPRAZolam (XANAX) 0.5 mg tablet Take 0.5 mg by mouth two (2) times a day. Yes Provider, Historical  
meclizine (ANTIVERT) 25 mg tablet Take 25 mg by mouth three (3) times daily as needed for Dizziness (vertigo). Indications: sensation of spinning or whirling   Yes Provider, Historical  
vit C/E/Zn/coppr/lutein/zeaxan (PRESERVISION AREDS 2 PO) Take 40 mg by mouth daily. Indications: eye lubrication   Yes Provider, Historical  
fluoxetine (PROZAC) 40 mg capsule Take 40 mg by mouth every morning. 2/18/11  Yes Provider, Historical  
aspirin 81 mg tablet Take 81 mg by mouth every morning. 2/18/11  Yes Provider, Historical  
CALCIUM CARBONATE/VITAMIN D3 (CALTRATE-600 PLUS VITAMIN D3 PO) Take 1 Tab by mouth every morning. 2/18/11  Yes Provider, Historical  
 
Current Facility-Administered Medications Medication Dose Route Frequency  [START ON 5/6/2019] VANCOMYCIN INFORMATION NOTE   Other ONCE  
 vancomycin (VANCOCIN) 750 mg in 0.9% sodium chloride (MBP/ADV) 250 mL  750 mg IntraVENous Q12H  cefepime (MAXIPIME) 2 g in 0.9% sodium chloride (MBP/ADV) 100 mL  2 g IntraVENous Q8H  
 QUEtiapine (SEROquel) tablet 25 mg  25 mg Oral QHS  sodium chloride (NS) flush 5-40 mL  5-40 mL IntraVENous Q8H  
  scopolamine (TRANSDERM-SCOP) 1 mg over 3 days 1 Patch  1 Patch TransDERmal Q72H  enoxaparin (LOVENOX) injection 40 mg  40 mg SubCUTAneous DAILY  FLUoxetine (PROzac) 20 mg/5 mL (4 mg/mL) oral solution 20 mg  20 mg Oral DAILY Allergies Allergen Reactions  Demerol [Meperidine] Nausea and Vomiting  Codeine Not Reported This Time  Penicillins Rash and Itching Has tolerated zosyn Social History Tobacco Use  Smoking status: Never Smoker  Smokeless tobacco: Never Used Substance Use Topics  Alcohol use: No  
  
History reviewed. No pertinent family history. Review of Systems: 
Review of systems not obtained due to patient factors. Objective: 
Vital Signs:   
Visit Vitals /69 (BP 1 Location: Left arm, BP Patient Position: At rest) Pulse 90 Temp 98.7 °F (37.1 °C) Resp 20 Ht 5' 7\" (1.702 m) Wt 76 kg (167 lb 8.8 oz) SpO2 95% BMI 26.24 kg/m² O2 Device: Room air O2 Flow Rate (L/min): 2 l/min Temp (24hrs), Av.4 °F (36.9 °C), Min:98.2 °F (36.8 °C), Max:98.7 °F (37.1 °C) Intake/Output:  
Last shift:       07 - 1900 In: 670 [P.O.:220; I.V.:450] Out: 3 Last 3 shifts:  1901 -  0700 In: 8412 [I.V.:1620] Out: 4395 [KIUUE:2332] Intake/Output Summary (Last 24 hours) at 2019 1330 Last data filed at 2019 1228 Gross per 24 hour Intake 1390 ml Output 1278 ml Net 112 ml Hemodynamics:  
PAP:   CO:    
Wedge:   CI:    
CVP:    SVR:    
  PVR:    
 
Ventilator Settings: 
Mode Rate Tidal Volume Pressure FiO2 PEEP  
         31 % Peak airway pressure:     
Minute ventilation:     
 
Physical Exam: 
 
General:  Arousable, ox 3. cooperative, no distress, appears stated age. Head:  Normocephalic, without obvious abnormality, atraumatic. Eyes:  Conjunctivae/corneas clear. PERRL, EOMs intact. Nose: Nares normal. Septum midline. Mucosa normal. No drainage or sinus tenderness. Throat: Lips, mucosa, and tongue normal. Teeth and gums normal.  
Neck: Supple, symmetrical, trachea midline, no adenopathy, thyroid: no enlargment/tenderness/nodules, no carotid bruit and no JVD. Back:   Symmetric, no curvature. ROM normal.  
Lungs:   Clear to auscultation bilaterally. Chest wall:  No tenderness or deformity. Heart:  Regular rate and rhythm, S1, S2 normal, no murmur, click, rub or gallop. Abdomen:   Soft, distended, incsions with dressing in place. Extremities: Extremities normal, atraumatic, no cyanosis or edema. Pulses: 2+ and symmetric all extremities. Skin: Skin color, texture, turgor normal. No rashes or lesions Lymph nodes: Cervical, supraclavicular, and axillary nodes normal.  
Neurologic: Grossly nonfocal, motor seems to be intact. Psych: no overt anxiety or depression. Data:  
 
Recent Results (from the past 24 hour(s)) MAGNESIUM Collection Time: 05/05/19  4:28 AM  
Result Value Ref Range Magnesium 2.0 1.6 - 2.4 mg/dL Telemetry:normal sinus rhythm Imaging: 
I have personally reviewed the patients radiographs and have reviewed the reports: 
5-3-19: IMPRESSION: 
  
1. Pulmonary vascular congestion, bibasilar atelectasis and small pleural 
effusions. 2. Significant gaseous distention of the stomach partly visualized.   
 
 
Ivin Leyden, MD

## 2019-05-06 LAB
ANION GAP SERPL CALC-SCNC: 10 MMOL/L (ref 5–15)
BACTERIA SPEC CULT: NORMAL
BACTERIA SPEC CULT: NORMAL
BASOPHILS # BLD: 0 K/UL (ref 0–0.1)
BASOPHILS NFR BLD: 0 % (ref 0–1)
BUN SERPL-MCNC: 10 MG/DL (ref 6–20)
BUN/CREAT SERPL: 15 (ref 12–20)
CALCIUM SERPL-MCNC: 7.9 MG/DL (ref 8.5–10.1)
CHLORIDE SERPL-SCNC: 103 MMOL/L (ref 97–108)
CO2 SERPL-SCNC: 24 MMOL/L (ref 21–32)
CREAT SERPL-MCNC: 0.66 MG/DL (ref 0.55–1.02)
DATE LAST DOSE: ABNORMAL
DIFFERENTIAL METHOD BLD: ABNORMAL
EOSINOPHIL # BLD: 0.1 K/UL (ref 0–0.4)
EOSINOPHIL NFR BLD: 1 % (ref 0–7)
ERYTHROCYTE [DISTWIDTH] IN BLOOD BY AUTOMATED COUNT: 13.3 % (ref 11.5–14.5)
GLUCOSE SERPL-MCNC: 100 MG/DL (ref 65–100)
HCT VFR BLD AUTO: 33.5 % (ref 35–47)
HGB BLD-MCNC: 11.8 G/DL (ref 11.5–16)
IMM GRANULOCYTES # BLD AUTO: 0 K/UL (ref 0–0.04)
IMM GRANULOCYTES NFR BLD AUTO: 0 % (ref 0–0.5)
LYMPHOCYTES # BLD: 2.1 K/UL (ref 0.8–3.5)
LYMPHOCYTES NFR BLD: 23 % (ref 12–49)
MAGNESIUM SERPL-MCNC: 2.1 MG/DL (ref 1.6–2.4)
MCH RBC QN AUTO: 30.3 PG (ref 26–34)
MCHC RBC AUTO-ENTMCNC: 35.2 G/DL (ref 30–36.5)
MCV RBC AUTO: 85.9 FL (ref 80–99)
MONOCYTES # BLD: 0.7 K/UL (ref 0–1)
MONOCYTES NFR BLD: 8 % (ref 5–13)
NEUTS SEG # BLD: 6.4 K/UL (ref 1.8–8)
NEUTS SEG NFR BLD: 68 % (ref 32–75)
NRBC # BLD: 0 K/UL (ref 0–0.01)
NRBC BLD-RTO: 0 PER 100 WBC
PLATELET # BLD AUTO: 216 K/UL (ref 150–400)
PMV BLD AUTO: 10.8 FL (ref 8.9–12.9)
POTASSIUM SERPL-SCNC: 2.8 MMOL/L (ref 3.5–5.1)
RBC # BLD AUTO: 3.9 M/UL (ref 3.8–5.2)
RBC MORPH BLD: ABNORMAL
REPORTED DOSE,DOSE: ABNORMAL UNITS
REPORTED DOSE/TIME,TMG: ABNORMAL
SERVICE CMNT-IMP: NORMAL
SERVICE CMNT-IMP: NORMAL
SODIUM SERPL-SCNC: 137 MMOL/L (ref 136–145)
VANCOMYCIN TROUGH SERPL-MCNC: 11.6 UG/ML (ref 5–10)
WBC # BLD AUTO: 9.3 K/UL (ref 3.6–11)
WBC MORPH BLD: ABNORMAL

## 2019-05-06 PROCEDURE — 74011250636 HC RX REV CODE- 250/636: Performed by: INTERNAL MEDICINE

## 2019-05-06 PROCEDURE — 74011250637 HC RX REV CODE- 250/637: Performed by: INTERNAL MEDICINE

## 2019-05-06 PROCEDURE — 65270000029 HC RM PRIVATE

## 2019-05-06 PROCEDURE — 74011250636 HC RX REV CODE- 250/636: Performed by: SURGERY

## 2019-05-06 PROCEDURE — 97535 SELF CARE MNGMENT TRAINING: CPT

## 2019-05-06 PROCEDURE — 85025 COMPLETE CBC W/AUTO DIFF WBC: CPT

## 2019-05-06 PROCEDURE — 36415 COLL VENOUS BLD VENIPUNCTURE: CPT

## 2019-05-06 PROCEDURE — 74011000258 HC RX REV CODE- 258: Performed by: INTERNAL MEDICINE

## 2019-05-06 PROCEDURE — 80048 BASIC METABOLIC PNL TOTAL CA: CPT

## 2019-05-06 PROCEDURE — 74011250637 HC RX REV CODE- 250/637: Performed by: SURGERY

## 2019-05-06 PROCEDURE — 83735 ASSAY OF MAGNESIUM: CPT

## 2019-05-06 PROCEDURE — 97116 GAIT TRAINING THERAPY: CPT

## 2019-05-06 PROCEDURE — 97530 THERAPEUTIC ACTIVITIES: CPT

## 2019-05-06 PROCEDURE — 74011000258 HC RX REV CODE- 258: Performed by: SURGERY

## 2019-05-06 PROCEDURE — 80202 ASSAY OF VANCOMYCIN: CPT

## 2019-05-06 PROCEDURE — 74011250636 HC RX REV CODE- 250/636: Performed by: HOSPITALIST

## 2019-05-06 RX ORDER — POTASSIUM CHLORIDE 750 MG/1
40 TABLET, FILM COATED, EXTENDED RELEASE ORAL 3 TIMES DAILY
Status: COMPLETED | OUTPATIENT
Start: 2019-05-06 | End: 2019-05-06

## 2019-05-06 RX ORDER — SODIUM CHLORIDE 9 MG/ML
75 INJECTION, SOLUTION INTRAVENOUS CONTINUOUS
Status: DISCONTINUED | OUTPATIENT
Start: 2019-05-06 | End: 2019-05-06

## 2019-05-06 RX ADMIN — CEFEPIME 2 G: 2 INJECTION, POWDER, FOR SOLUTION INTRAVENOUS at 17:12

## 2019-05-06 RX ADMIN — POTASSIUM CHLORIDE: 2 INJECTION, SOLUTION, CONCENTRATE INTRAVENOUS at 15:01

## 2019-05-06 RX ADMIN — POTASSIUM CHLORIDE 40 MEQ: 750 TABLET, EXTENDED RELEASE ORAL at 21:04

## 2019-05-06 RX ADMIN — VANCOMYCIN HYDROCHLORIDE 1000 MG: 1 INJECTION, POWDER, LYOPHILIZED, FOR SOLUTION INTRAVENOUS at 13:08

## 2019-05-06 RX ADMIN — MORPHINE SULFATE 2 MG: 2 INJECTION, SOLUTION INTRAMUSCULAR; INTRAVENOUS at 21:04

## 2019-05-06 RX ADMIN — Medication 10 ML: at 10:38

## 2019-05-06 RX ADMIN — POTASSIUM CHLORIDE 40 MEQ: 750 TABLET, EXTENDED RELEASE ORAL at 17:13

## 2019-05-06 RX ADMIN — CEFEPIME 2 G: 2 INJECTION, POWDER, FOR SOLUTION INTRAVENOUS at 10:38

## 2019-05-06 RX ADMIN — ENOXAPARIN SODIUM 40 MG: 40 INJECTION SUBCUTANEOUS at 08:08

## 2019-05-06 RX ADMIN — QUETIAPINE FUMARATE 25 MG: 25 TABLET ORAL at 21:04

## 2019-05-06 RX ADMIN — Medication 10 ML: at 06:00

## 2019-05-06 RX ADMIN — ALPRAZOLAM 0.5 MG: 0.5 TABLET ORAL at 20:08

## 2019-05-06 RX ADMIN — CEFEPIME 2 G: 2 INJECTION, POWDER, FOR SOLUTION INTRAVENOUS at 01:31

## 2019-05-06 RX ADMIN — VANCOMYCIN HYDROCHLORIDE 750 MG: 750 INJECTION, POWDER, LYOPHILIZED, FOR SOLUTION INTRAVENOUS at 01:31

## 2019-05-06 RX ADMIN — Medication 10 ML: at 21:05

## 2019-05-06 RX ADMIN — POTASSIUM CHLORIDE 40 MEQ: 750 TABLET, EXTENDED RELEASE ORAL at 10:30

## 2019-05-06 RX ADMIN — FLUOXETINE HYDROCHLORIDE 20 MG: 20 SOLUTION ORAL at 08:08

## 2019-05-06 NOTE — PROGRESS NOTES
TRANSFER - OUT REPORT: 
 
Verbal report given to Liz(name) on Chetan Mars  being transferred to [facility]) for routine progression of care Report consisted of patients Situation, Background, Assessment and  
Recommendations(SBAR). Information from the following report(s) SBAR, Kardex, Intake/Output, MAR and Cardiac Rhythm nsr was reviewed with the receiving nurse. Lines:  
Peripheral IV 05/05/19 Right Wrist (Active) Site Assessment Clean, dry, & intact 5/6/2019 12:35 PM  
Phlebitis Assessment 0 5/6/2019 12:35 PM  
Infiltration Assessment 0 5/6/2019 12:35 PM  
Dressing Status Clean, dry, & intact 5/6/2019 12:35 PM  
Dressing Type Tape;Transparent 5/6/2019 12:35 PM  
Hub Color/Line Status Pink; Infusing 5/6/2019 12:35 PM  
  
 
Opportunity for questions and clarification was provided. Patient transported with: 
 Patient-specific medications from Pharmacy Tech Transferred to room 2103 via wheelchair and assisted to bed without incident. IV fluids to be started, last bit of Vanc infusing still. Hoffman in room and aware of transfer with daughter and son- Darcy Furnace.

## 2019-05-06 NOTE — PROGRESS NOTES
Pharmacy Automatic Renal Dosing Protocol - Antimicrobials Indication for Antimicrobials: Possible aspiration pneumonia Current Regimen of Each Antimicrobial: 
Cefepime 2 grams IV every 8 hours (Started 19; Day #3 of 7) Vancomycin 750 mg IV every 12 hours (Started 19; Day #3 of 7) Previous Antimicrobial Therapy: 
Piperacillin-tazobactam surgical PPX on 19 Vancomycin Trough Goal: 15-20 mcg/mL Vancomycin Level Assessment: 
Date Dose & Interval Measured (mcg/mL) Extrapolated (mcg/mL)  
19 at 0010 750 mg IV every 12 hours 11.6 mcg/mL 11.34 mcg/mL Significant Cultures:  
19 MRSA screen = Results pending 5/3/19 Blood culture = No growth x 2 days (Results pending) 19 Blood culture = No growth (FINAL) Labs: 
Recent Labs 19 
0345 19 
1413 19 
2330 CREA 0.66 0.77 0.75 BUN 10 12 11 WBC 9.3 9.5 9.9 Temp (24hrs), Av.3 °F (36.8 °C), Min:97.9 °F (36.6 °C), Max:98.7 °F (37.1 °C) Creatinine Clearance (mL/min) or Dialysis: 64 mL/min Impression/Plan:  
Cefepime dosed appropriately based on indication and renal function. Continue current regimen. Vancomycin trough below goal. Vancomycin dose adjusted to 1000 mg IV every 12 hours to achieve goal trough. Antimicrobial stop date: 7 day duration entered after discussion with Dr Melisa Hernández on 19 CCU rounds. Pharmacy will follow daily and adjust medications as appropriate for renal function and/or serum levels. Thank you, Sanjuana Bravo, PHARMD

## 2019-05-06 NOTE — PROGRESS NOTES
Problem: Self Care Deficits Care Plan (Adult) Goal: *Acute Goals and Plan of Care (Insert Text) Description Occupational Therapy Goals Initiated 5/1/2019 1. Patient will perform grooming in sitting with supervision/set-up within 7 day(s). 2.  Patient will perform self-feeding with supervision/set-up within 7 day(s). 3.  Patient will perform upper body dressing with supervision/set-up within 7 day(s). 4.  Patient will perform toilet transfers to [address] [place] [address] with moderate assistance  within 7 day(s). 5.  Patient will perform all aspects of toileting with moderate assistance  within 7 day(s). 6.  Patient will participate in [facility] within 7 day(s). Outcome: Progressing Towards Goal 
 OCCUPATIONAL THERAPY TREATMENT Patient: Lacie Coley (83 y.o. female) Date: 5/6/2019 Diagnosis: [facility] [K56.609] <principal problem not specified> Procedure(s) (LRB): 
LAPAROSCOPY GENERAL DIAGNOSTIC LYSIS OF ADHESIONS (N/A) 6 Days Post-Op Precautions: Fall(confusion) Chart, occupational therapy assessment, plan of care, and goals were reviewed. ASSESSMENT: 
Continues to demonstrate increased confusion and AMS evidenced by verbalizing someone was coming to get her to the PCT at close of OT session. Pt presents with perseveration throughout conversation about D/C plans even with education regarding present recommendation and that her physician and medical team will make sure she has everything she needs when it is time for her to discharge. Progressing to Min A with LB dressing with increased time and multi modal cueing for initiation, maintaining attention and problem solving through the task. Progressing to [facility] with toilet transfer and CM. Pt left in chair, all needs met and RN made aware of session progress. Recommend Rehab at discharge vs [facility] OT with assist from Dtr PRN. Progression toward goals: 
?       Improving appropriately and progressing toward goals ?       Improving slowly and progressing toward goals ? Not making progress toward goals and plan of care will be adjusted PLAN: 
Patient continues to benefit from skilled intervention to address the above impairments. Continue treatment per established plan of care. Discharge Recommendations: Rehab Further Equipment Recommendations for Discharge:  Defer to rehab SUBJECTIVE:  
Patient stated ? When will they come to take me to that place? \" OBJECTIVE DATA SUMMARY:  
Cognitive/Behavioral Status: 
Neurologic State: Alert;Confused Orientation Level: Oriented to person;Oriented to time;Disoriented to situation;Disoriented to place Cognition: Decreased attention/concentration;Decreased command following Perception: Appears intact Perseveration: Perseverates during conversation;Perseverates during ADLS Safety/Judgement: Decreased awareness of need for assistance;Decreased awareness of environment;Decreased awareness of need for safety;Decreased insight into deficits Functional Mobility and Transfers for ADLs: 
Transfers: 
Sit to Stand: Contact guard assistance Functional Transfers Bathroom Mobility: Contact guard assistance Toilet Transfer : Contact guard assistance;Assist x1 Bed to Chair: Contact guard assistance Balance: 
Sitting: Impaired Sitting - Static: Good (unsupported) Sitting - Dynamic: Fair (occasional) Standing: Impaired Standing - Static: Fair;Occasional 
Standing - Dynamic : Poor;Constant support ADL Intervention: Lower Body Dressing Assistance Socks: Minimum Assistance (with verbal/visual cueing for initiation, maintaining attention and problem solving d/t confusion) Cognitive Retraining Orientation Retraining: Time;Place Problem Solving: Identifying the task Organizing/Sequencing: Breaking task down Attention to Task: Single task Maintains Attention For (Time): 30 seconds Following Commands: Follows one step commands/directions(with Min multimodal cueing) Safety/Judgement: Decreased awareness of need for assistance;Decreased awareness of environment;Decreased awareness of need for safety;Decreased insight into deficits Cues: Verbal cues provided;Visual cues provided Activity Tolerance:  
Limited d/t confusion and need to continually re-orient Pt to place and situation. Please refer to the flowsheet for vital signs taken during this treatment. After treatment:  
? Patient left in no apparent distress sitting up in chair ? Patient left in no apparent distress in bed 
? Call bell left within reach ? Nursing notified ? Caregiver present ? Chair alarm activated COMMUNICATION/COLLABORATION:  
The patient?s plan of care was discussed with: Physical Therapist, Registered Nurse [facility], OTR/L Time Calculation: 27 mins

## 2019-05-06 NOTE — PROGRESS NOTES
Admit Date: 2019 POD 6 Days Post-Op Procedure:  Procedure(s): LAPAROSCOPY GENERAL DIAGNOSTIC LYSIS OF ADHESIONS Subjective:  
 
Patient somewhat confused.  + BMs. No nausea or abdominal pain. Objective:  
 
Blood pressure (!) 90/38, pulse (!) 101, temperature 98.1 °F (36.7 °C), resp. rate 18, height 5' 7\" (1.702 m), weight 164 lb 3.9 oz (74.5 kg), SpO2 99 %. Temp (24hrs), Av.3 °F (36.8 °C), Min:97.9 °F (36.6 °C), Max:98.6 °F (37 °C) Physical Exam:  GENERAL: alert, cooperative, no distress, appears stated age, LUNG: clear to auscultation bilaterally, HEART: regular rate and rhythm, ABDOMEN: soft, non-tender. Bowel sounds normal. Wounds c/d/i. EXTREMITIES:  extremities normal, atraumatic, no cyanosis or edema Labs:  
Recent Results (from the past 24 hour(s)) Jcarlos Ortiz Collection Time: 19 12:10 AM  
Result Value Ref Range Vancomycin,trough 11.6 (H) 5.0 - 10.0 ug/mL Reported dose date: NOT PROVIDED Reported dose time: NOT PROVIDED Reported dose: NOT PROVIDED UNITS  
CBC WITH AUTOMATED DIFF Collection Time: 19  3:45 AM  
Result Value Ref Range WBC 9.3 3.6 - 11.0 K/uL  
 RBC 3.90 3.80 - 5.20 M/uL  
 HGB 11.8 11.5 - 16.0 g/dL HCT 33.5 (L) 35.0 - 47.0 % MCV 85.9 80.0 - 99.0 FL  
 MCH 30.3 26.0 - 34.0 PG  
 MCHC 35.2 30.0 - 36.5 g/dL  
 RDW 13.3 11.5 - 14.5 % PLATELET 108 124 - 600 K/uL MPV 10.8 8.9 - 12.9 FL  
 NRBC 0.0 0  WBC ABSOLUTE NRBC 0.00 0.00 - 0.01 K/uL NEUTROPHILS 68 32 - 75 % LYMPHOCYTES 23 12 - 49 % MONOCYTES 8 5 - 13 % EOSINOPHILS 1 0 - 7 % BASOPHILS 0 0 - 1 % IMMATURE GRANULOCYTES 0 0.0 - 0.5 % ABS. NEUTROPHILS 6.4 1.8 - 8.0 K/UL  
 ABS. LYMPHOCYTES 2.1 0.8 - 3.5 K/UL  
 ABS. MONOCYTES 0.7 0.0 - 1.0 K/UL  
 ABS. EOSINOPHILS 0.1 0.0 - 0.4 K/UL  
 ABS. BASOPHILS 0.0 0.0 - 0.1 K/UL  
 ABS. IMM.  GRANS. 0.0 0.00 - 0.04 K/UL  
 DF MANUAL    
 RBC COMMENTS NORMOCYTIC, NORMOCHROMIC    
 WBC COMMENTS REACTIVE LYMPHS    
METABOLIC PANEL, BASIC Collection Time: 05/06/19  3:45 AM  
Result Value Ref Range Sodium 137 136 - 145 mmol/L Potassium 2.8 (L) 3.5 - 5.1 mmol/L Chloride 103 97 - 108 mmol/L  
 CO2 24 21 - 32 mmol/L Anion gap 10 5 - 15 mmol/L Glucose 100 65 - 100 mg/dL BUN 10 6 - 20 MG/DL Creatinine 0.66 0.55 - 1.02 MG/DL  
 BUN/Creatinine ratio 15 12 - 20 GFR est AA >60 >60 ml/min/1.73m2 GFR est non-AA >60 >60 ml/min/1.73m2 Calcium 7.9 (L) 8.5 - 10.1 MG/DL MAGNESIUM Collection Time: 05/06/19  3:45 AM  
Result Value Ref Range Magnesium 2.1 1.6 - 2.4 mg/dL Data Review images and reports reviewed Assessment:  
 
Active Problems: 
  SBO (small bowel obstruction) ([facility] [address] (4/28/2019) Plan/Recommendations/Medical Decision Making:  
 
Continue present treatment GI lite diet Presumed aspiration pneumonia, appreciate pulmonary assistance. Okay to transfer out of ICU today. Shaila Jain. Karen Marinelli MD, [facility] [address] [facility]

## 2019-05-06 NOTE — PROGRESS NOTES
Problem: Mobility Impaired (Adult and Pediatric) Goal: *Acute Goals and Plan of Care (Insert Text) Description Physical Therapy Goals Initiated 5/1/2019 1. Patient will move from supine to sit and sit to supine , scoot up and down and roll side to side in bed with supervision/set-up within 7 day(s). 2.  Patient will transfer from bed to chair and chair to bed with minimal assistance/contact guard assist using the least restrictive device within 7 day(s). 3.  Patient will perform sit to stand with minimal assistance/contact guard assist within 7 day(s). 4.  Patient will ambulate with minimal assistance/contact guard assist for 50 feet with the least restrictive device within 7 day(s). Outcome: Progressing Towards Goal 
 PHYSICAL THERAPY TREATMENT Patient: Hannah Rueda (42 y.o. female) Date: 5/6/2019 Diagnosis: SBO (small bowel obstruction) ([facility] [K56.609] <principal problem not specified> Procedure(s) (LRB): 
LAPAROSCOPY GENERAL DIAGNOSTIC LYSIS OF ADHESIONS (N/A) 6 Days Post-Op Precautions: Fall(confusion) Chart, physical therapy assessment, plan of care and goals were reviewed. ASSESSMENT: 
Patient was sitting up in bedside chair when PT arrived. She agreed to therapy and cleared by her nurse for mobilization. She was mildly confused about her current situation and location. Needed intermittent redirection to stay focused on tasks. Currently needed cg assistance for sit to stand from chair. Standing balance was fair with need for intermittent support statically. Gait progressed to 105 feet with cg assistance using cardiac cart. Gait was fairly steady with mild fatigue by the end of training. She can likely progress off the cart to no assistive devices next session. Performed sitting ankle pumps, laq x 10 reps each in chair. Left patient in chair with alarm pad activated when the session ended. Recommend SNF vs HH PT with 24/7 care depending on progress toward PT goals. Recommend Progression toward goals: 
?    Improving appropriately and progressing toward goals ? Improving slowly and progressing toward goals ? Not making progress toward goals and plan of care will be adjusted PLAN: 
Patient continues to benefit from skilled intervention to address the above impairments. Continue treatment per established plan of care. Discharge Recommendations:  [facility] Further Equipment Recommendations for Discharge:  None SUBJECTIVE:  
Patient stated ? When will I be going the the new place? .? OBJECTIVE DATA SUMMARY:  
Critical Behavior: 
Neurologic State: Alert, Confused Orientation Level: Oriented to person, Oriented to place Cognition: Decreased attention/concentration, Poor safety awareness, Impulsive Safety/Judgement: Decreased insight into deficits, Decreased awareness of environment, Decreased awareness of need for safety, Decreased awareness of need for assistance, Fall prevention Functional Mobility Training: 
Transfers: 
Sit to Stand: Contact guard assistance Stand to Sit: Contact guard assistance Bed to Chair: Contact guard assistance Balance: 
Sitting: Impaired Sitting - Static: Good (unsupported) Sitting - Dynamic: Fair (occasional) Standing: Impaired Standing - Static: Fair;Occasional 
Standing - Dynamic : Poor;Constant support Ambulation/Gait Training: 
Distance (ft): 105 Feet (ft) Assistive Device: Gait belt(cardiac cart) Ambulation - Level of Assistance: Contact guard assistance;Assist x1 Gait Abnormalities: Path deviations Speed/Xochilt: Pace decreased (<100 feet/min) Pain: 
Pain Scale 1: Numeric (0 - 10) Pain Intensity 1: 0 Activity Tolerance:  
Fair. Please refer to the flowsheet for vital signs taken during this treatment. After treatment:  
?    Patient left in no apparent distress sitting up in chair ?    Patient left in no apparent distress in bed 
? Call bell left within reach ? Nursing notified ? Caregiver present ? Bed alarm activated COMMUNICATION/COLLABORATION:  
The patient?s plan of care was discussed with: Occupational Therapist and Registered Nurse Michaelle Alexandra, PT Time Calculation: 24 mins

## 2019-05-06 NOTE — PROGRESS NOTES
Nutrition Assessment: 
 
INTERVENTIONS/RECOMMENDATIONS:  
· Diet progression per General Surgery · Ensure clear for added kcal and protein ASSESSMENT:  
Chart reviewed and pt discussed on CCU rounds. Pt NPO or CLD x 7-8 days. Flowsheet documents appetite as fair. Will add ensure clear for added kcal and protein. Diet progression per General Surgery. Diet Order: Clear liquids 
% Eaten:   
Patient Vitals for the past 72 hrs: 
 % Diet Eaten 05/05/19 1730 25 % Pertinent Medications: [x]Reviewed: KCl, Pertinent Labs: [x]Reviewed: K+ 2.8, Food Allergies: [x]NKFA  []Other Last BM:  5/5 Edema: trace  []RUE   []LUE   []RLE   []LLE Pressure Ulcer:  n/a  [] Stage I   [] Stage II   [] Stage III   [] Stage IV Anthropometrics: Height: 5' 7\" (170.2 cm) Weight: 74.5 kg (164 lb 3.9 oz) IBW (%IBW):   ( ) UBW (%UBW):   (  %) BMI: Body mass index is 25.72 kg/m². This BMI is indicative of: 
[]Underweight   []Normal   [x]Overweight   [] Obesity   [] Extreme Obesity (BMI>40) Last Weight Metrics: 
Weight Loss Metrics 5/6/2019 7/26/2018 6/26/2018 2/17/2017 6/9/2016 4/29/2016 8/1/2014 Today's Wt 164 lb 3.9 oz 180 lb 181 lb 181 lb 4 oz 178 lb 179 lb 170 lb BMI 25.72 kg/m2 28.19 kg/m2 28.35 kg/m2 28.39 kg/m2 27.45 kg/m2 28.03 kg/m2 26.62 kg/m2 Estimated Nutrition Needs (Based on): 9342 Kcals/day(BMR (1286) x 1. 3AF) , 77 g(1.0 g/kg bw) Protein Carbohydrate: At Least 130 g/day  Fluids: 1670 mL/day or per primary team 
 
NUTRITION DIAGNOSES:  
Problem:  Inadequate protein-energy intake Etiology: related to SBO, acute met encephalopathy Signs/Symptoms: as evidenced by CL/NPO x 5 days Previous Nutrition Dx:  [] Resolved  [] Unresolved           [x] Progressing NUTRITION INTERVENTIONS: 
Meals/Snacks: General/healthful diet GOAL:  
Progress diet as medically feasible within 2-4 days NUTRITION MONITORING AND EVALUATION  
  
 Food/Nutrient Intake Outcomes: Total energy intake Physical Signs/Symptoms Outcomes: GI profile, Weight/weight change Previous Goal Met: 
 [] Met              [x] Progressing Towards Goal              [] Not Progressing Towards Goal  
Previous Recommendations: 
 [] Implemented          [x] Not Implemented          [] Not Applicable LEARNING NEEDS (Diet, Food/Nutrient-Drug Interaction):  
 [x] None Identified 
 [] Identified and Education Provided/Documented 
 [] Identified and Pt declined/was not appropriate Cultural, Druze, OR Ethnic Dietary Needs:  
 [x] None Identified 
 [] Identified and Addressed 
 
 [x] Interdisciplinary Care Plan Reviewed/Documented  
 [x] Discharge Planning: General healthy diet [x] Participated in Interdisciplinary Rounds NUTRITION RISK:  
 [x] High              [] Moderate           []  Low  []  Minimal/Uncompromised Roderick Maria RDN Pager [phone] Weekend Pager [phone]

## 2019-05-06 NOTE — PROGRESS NOTES
[facility], Critical Care, and Sleep Medicine Name: Tyler Brennan MRN: [id number] : 1940 Hospital: [address] Date: 2019 Critical Care  Patient Consult IMPRESSION:  
· Small bowel obstruction, s/p Ex Lap. With lysis of adhesion. · Encephalopathy- still confused but easily reoriented · Hyponatremia · hypokalemia · Acute renal failure · Anxiety, hx. Has been on Xanax and prozac. RECOMMENDATIONS:  
· May transfer to floor · Fall precautions · Following serial labs · Sodium level improving. · On Empiric Cefepime and Vanc. · ON DVT prophylaxis. · Prn anxiety and pain meds. Subjective/History: This patient has been seen and evaluated at the request of Dr. Araceli Mclean for above. Patient is a 66 y.o. female who was moved to ICU for closer monitoring. NO acute issues this am. 
No acute issues noted. . Eating. Had BMs. No SOB on room air. K < 3 
 
19:  Pt has not acute complaints when seen this am. Conversant. Does have a dry cough. NO chest pain, no back pain, has expected abdominal pain. Past Medical History:  
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  
 pt denies any issues with dysphagia (food) at this time 2/15/17  GERD (gastroesophageal reflux disease)  IBS (irritable bowel syndrome)  Nausea & vomiting  Pneumonia age 8  PUD (peptic ulcer disease)  Stroke [facility])   
 2 \"mini-byers\" -as of  pt denies any residual effect  Unspecified adverse effect of anesthesia   
 sister-almost dies after surgeries. pt has never head of malignant hyperthermia and pt has never had difficulty during anesthesia Past Surgical History:  
Procedure Laterality Date  COLONOSCOPY N/A 2016 COLONOSCOPY performed by Marya Coburn MD at [facility] ENDOSCOPY  
 HX BREAST BIOPSY Bilateral   
 benign  HX HYSTERECTOMY  HX LAP CHOLECYSTECTOMY  HX SALPINGO-OOPHORECTOMY \"left one ovary\"  HX SHOULDER ARTHROSCOPY Left  HX TONSILLECTOMY  HX TUBAL LIGATION    
 LA COLONOSCOPY FLX DX W/COLLJ SPEC WHEN PFRMD  2/21/2011 Prior to Admission medications Medication Sig Start Date End Date Taking? Authorizing Provider  
clemastine 2.68 mg tab tablet Take 1.34 mg by mouth daily as needed (Allergies). Yes Provider, Historical  
raNITIdine (ZANTAC) 150 mg tablet Take 150 mg by mouth daily as needed for Indigestion (acid reflux). Yes Provider, Historical  
polysorbate 80/glycerin (REFRESH DRY EYE THERAPY OP) Apply 2 Drops to eye daily. Yes Provider, Historical  
vitamin E (AQUA GEMS) 400 unit capsule Take 400 Units by mouth daily. Yes Provider, Historical  
ALPRAZolam (XANAX) 0.5 mg tablet Take 0.5 mg by mouth two (2) times a day. Yes Provider, Historical  
meclizine (ANTIVERT) 25 mg tablet Take 25 mg by mouth three (3) times daily as needed for Dizziness (vertigo). Indications: sensation of spinning or whirling   Yes Provider, Historical  
vit C/E/Zn/coppr/lutein/zeaxan (PRESERVISION AREDS 2 PO) Take 40 mg by mouth daily. Indications: eye lubrication   Yes Provider, Historical  
fluoxetine (PROZAC) 40 mg capsule Take 40 mg by mouth every morning. 2/18/11  Yes Provider, Historical  
aspirin 81 mg tablet Take 81 mg by mouth every morning. 2/18/11  Yes Provider, Historical  
CALCIUM CARBONATE/VITAMIN D3 (CALTRATE-600 PLUS VITAMIN D3 PO) Take 1 Tab by mouth every morning. 2/18/11  Yes Provider, Historical  
 
Current Facility-Administered Medications Medication Dose Route Frequency  vancomycin (VANCOCIN) 750 mg in 0.9% sodium chloride (MBP/ADV) 250 mL  750 mg IntraVENous Q12H  cefepime (MAXIPIME) 2 g in 0.9% sodium chloride (MBP/ADV) 100 mL  2 g IntraVENous Q8H  
 QUEtiapine (SEROquel) tablet 25 mg  25 mg Oral QHS  sodium chloride (NS) flush 5-40 mL  5-40 mL IntraVENous Q8H  
  scopolamine (TRANSDERM-SCOP) 1 mg over 3 days 1 Patch  1 Patch TransDERmal Q72H  enoxaparin (LOVENOX) injection 40 mg  40 mg SubCUTAneous DAILY  FLUoxetine (PROzac) 20 mg/5 mL (4 mg/mL) oral solution 20 mg  20 mg Oral DAILY Allergies Allergen Reactions  Demerol [Meperidine] Nausea and Vomiting  Codeine Not Reported This Time  Penicillins Rash and Itching Has tolerated zosyn Social History Tobacco Use  Smoking status: Never Smoker  Smokeless tobacco: Never Used Substance Use Topics  Alcohol use: No  
  
History reviewed. No pertinent family history. Review of Systems: 
Review of systems not obtained due to patient factors. Objective: 
Vital Signs:   
Visit Vitals /62 (BP 1 Location: Right arm, BP Patient Position: At rest) Pulse 84 Temp 97.9 °F (36.6 °C) Resp 21 Ht 5' 7\" (1.702 m) Wt 74.5 kg (164 lb 3.9 oz) SpO2 97% BMI 25.72 kg/m² O2 Device: Room air O2 Flow Rate (L/min): 2 l/min Temp (24hrs), Av.4 °F (36.9 °C), Min:97.9 °F (36.6 °C), Max:98.7 °F (37.1 °C) Intake/Output:  
Last shift:      No intake/output data recorded. Last 3 shifts:  1901 -  0700 In:  [P.O.:660; I.V.:1295] Out: Valeria Mort [OOWMV:6142] Intake/Output Summary (Last 24 hours) at 2019 7241 Last data filed at 2019 0700 Gross per 24 hour Intake 1505 ml Output 1213 ml Net 292 ml Physical Exam: 
 
General:  Arousable, ox 3. cooperative, no distress, appears stated age. Head:  Normocephalic, without obvious abnormality, atraumatic. Eyes:  Conjunctivae/corneas clear. PERRL, EOMs intact. Nose: Nares normal. Septum midline. Mucosa normal. No drainage or sinus tenderness. Throat: Lips, mucosa, and tongue normal. Teeth and gums normal.  
Neck: Supple, symmetrical, trachea midline, no adenopathy, thyroid: no enlargment/tenderness/nodules, no carotid bruit and no JVD. Back:   Symmetric, no curvature.  ROM normal.  
 Lungs:   Clear to auscultation bilaterally. Chest wall:  No tenderness or deformity. Heart:  Regular rate and rhythm, S1, S2 normal, no murmur, click, rub or gallop. Abdomen:   Soft, distended, incsions with dressing in place. Extremities: Extremities normal, atraumatic, no cyanosis or edema. Pulses: 2+ and symmetric all extremities. Skin: Skin color, texture, turgor normal. No rashes or lesions Lymph nodes: Cervical, supraclavicular, and axillary nodes normal.  
Neurologic: Grossly nonfocal, motor seems to be intact. Psych: no overt anxiety or depression. Data:  
 
       
Labs: 
 
Recent Labs 05/06/19 
0345 05/04/19 
3263 05/03/19 
2330 WBC 9.3 9.5 9.9 HGB 11.8 11.9 11.1*  
 156 154 Recent Labs 05/06/19 
0345 05/05/19 
4251 05/04/19 
9886 05/03/19 
2330   --  136 132*  
K 2.8*  --  3.7 3.3*  
  --  101 98 CO2 24  --  24 24   --  117* 94 BUN 10  --  12 11 CREA 0.66  --  0.77 0.75 CA 7.9*  --  7.9* 8.3*  
MG 2.1 2.0 2.0  --   
LAC  --   --   --  1.0 ALB  --   --   --  3.0*  
SGOT  --   --   --  46* ALT  --   --   --  25 No results for input(s): PH, PCO2, PO2, HCO3, FIO2 in the last 72 hours. No results for input(s): CPK, CKNDX, TROIQ in the last 72 hours. No lab exists for component: CPKMB No results found for: BNPP, BNP Telemetry:normal sinus rhythm Imaging: 
I have personally reviewed the patients radiographs and have reviewed the reports: 
5-3-19: IMPRESSION: 
  
1. Pulmonary vascular congestion, bibasilar atelectasis and small pleural 
effusions. 2. Significant gaseous distention of the stomach partly visualized.   
 
 
Volodymyr Alfaro MD

## 2019-05-06 NOTE — PROGRESS NOTES
General Surgery End of Shift Nursing Note Bedside shift change report given to NIKOLE (oncoming nurse) by Jamar Lockett (offgoing nurse). Report included the following information SBAR. Shift worked:   3p-7p Summary of shift:    Patient arrived on unit at 1500. Patient continuously confused, oriented only to self. Has bed alarm and tele sitter in place. Feliz removed at 1330, voided at 1845 Issues for physician to address:   None Number times ambulated in hallway past shift: 0 Number of times OOB to chair past shift: 1 Pain Management: 
Current medication: None Patient states pain is manageable on current pain medication: YES 
 
GI: 
 
Current diet:  DIET NUTRITIONAL SUPPLEMENTS No; Breakfast, Dinner; Ensure Clear DIET GI LITE (POST SURGICAL) Tolerating current diet: YES Passing flatus: YES Last Bowel Movement: yesterday Respiratory: 
 
Incentive Spirometer at bedside: YES Patient instructed on use: YES Patient Safety: 
 
Falls Score: 5 Bed Alarm On? Yes Sitter? Yes Josias Sprout

## 2019-05-06 NOTE — PROGRESS NOTES
Hospitalist Progress Note NAME: Mo Ibarra :  1940 MRN:  [id number] Assessment / Plan: 
Acute metabolic Encephalopathy SOB? Aspiration pneumonia  
 etiology unclear likely multifactorial, polypharmacy vs dehydration Protonix, zosyn DC  2days ago Ct scan brain unremarkable , labs wnl . No bacterial  
Pt started on seroquel , her xanax was restarted then stopped , now is on xanax prn 
EEG : no seizures Overnight events noted , pt with increasing confusion , was found to be SOB + fever Transferred to the ICU by surgery Pt treated as aspiration pneumonia with cefepime Leopold Waggoner 
BCx NTD , no leucocytosis 
 will check MRSA , DC vanco if MRSA neg . No sputum sample available On chest xray : no pneumonia Transferred back  to the floors Pt still on/off confusion , currently with telesitter Consider neuro consult if persistent confusion Hypokalemia Hyponatremia Vivica Comes JOSH most likely prerenal resolved Na 130 ( down from 135)  ,  c/w encouraging PO intake , daily bmp NA wnl now  
k 2.8 repleted  
  
Anxiety: c/w  Xanax and  Prozac  
  
SBO s/p explorative laprascopy  With lysis of adhesions, Persistent SBO  
 care per surgery Repeat CT scan : 
Mild to moderate distention of proximal mid small bowel loops with transition 
to normal caliber distal small bowel. Diagnostic considerations include ileus 
and small bowel obstruction. Clinical correlation recommended. 2. Trace free peritoneal fluid. 3. Small bilateral pleural effusions. Now able toGi lite Body mass index is 25.72 kg/m². therefore classifying patient as overweight, NOS (body mass index [BMI] of 25 to 29.9 kg/m2) Code status: Full Prophylaxis: Lovenox Recommended Disposition: per surgery Subjective: Chief Complaint / Reason for Physician Visit FU AMS / aspiration pneumonia? Kerrie Mckinney Afebrile . denies CP , SOB , abdominal pain . More confused today ,trying to get of of bed .    AAOx3 Discussed with RN events overnight. Review of Systems: 
Symptom Y/N Comments  Symptom Y/N Comments Fever/Chills    Chest Pain n   
Poor Appetite    Edema Cough    Abdominal Pain n   
Sputum    Joint Pain SOB/ALARCON n   Pruritis/Rash Nausea/vomit n   Tolerating PT/OT Diarrhea    Tolerating Diet y GI lite Constipation    Other Could NOT obtain due to:   
 
Objective: VITALS:  
Last 24hrs VS reviewed since prior progress note. Most recent are: 
Patient Vitals for the past 24 hrs: 
 Temp Pulse Resp BP SpO2  
05/06/19 1459 98.7 °F (37.1 °C) 85 20 123/51 94 % 05/06/19 1430  80 25    
05/06/19 1400  95 22 102/51 96 % 05/06/19 1325  94 19  99 % 05/06/19 1324  97 17  97 % 05/06/19 1143  100 19 99/46   
05/06/19 1120 98.1 °F (36.7 °C) (!) 101 18 (!) 90/38 99 % 05/06/19 0800  81 17  95 % 05/06/19 0740  84 21  97 % 05/06/19 0700 97.9 °F (36.6 °C) 84 20 131/62 97 % 05/06/19 0400 98.6 °F (37 °C) 80 20 144/64 100 % 05/06/19 0000 98.2 °F (36.8 °C) 84 20 140/60 93 % 05/05/19 2000 98.4 °F (36.9 °C) 94 23 145/56 95 % 05/05/19 1730  96 20 158/65 95 % 05/05/19 1534 98.3 °F (36.8 °C) 84 18 130/52 97 % Intake/Output Summary (Last 24 hours) at 5/6/2019 1510 Last data filed at 5/6/2019 1501 Gross per 24 hour Intake 1745 ml Output 1385 ml Net 360 ml PHYSICAL EXAM: 
General: WD, WN. Alert, cooperative, no acute distress   
EENT:  EOMI. Anicteric sclerae. MMM Resp:  CTA bilaterally, no wheezing or rales. No accessory muscle use CV:  Regular  rhythm,  No edema GI:  Soft, + distended, Non tender.  no BS heard Neurologic:  Alert and oriented X 3, normal speech, Psych:   Agitated Skin:  No rashes. No jaundice Reviewed most current lab test results and cultures  YES Reviewed most current radiology test results   YES Review and summation of old records today    NO Reviewed patient's current orders and MAR    YES 
 PMH/SH reviewed - no change compared to H&P 
________________________________________________________________________ Care Plan discussed with: 
  Comments Patient y Family RN y   
Care Manager Consultant Multidiciplinary team rounds were held today with , nursing, pharmacist and clinical coordinator. Patient's plan of care was discussed; medications were reviewed and discharge planning was addressed. ________________________________________________________________________ Total NON critical care TIME:  20   Minutes Total CRITICAL CARE TIME Spent:   Minutes non procedure based Comments >50% of visit spent in counseling and coordination of care    
________________________________________________________________________ Douglas Rowell MD  
 
Procedures: see electronic medical records for all procedures/Xrays and details which were not copied into this note but were reviewed prior to creation of Plan. LABS: 
I reviewed today's most current labs and imaging studies. Pertinent labs include: 
Recent Labs 05/06/19 
0345 05/04/19 
4477 05/03/19 
2330 WBC 9.3 9.5 9.9 HGB 11.8 11.9 11.1*  
HCT 33.5* 35.0 32.4*  
 156 154 Recent Labs 05/06/19 
0345 05/05/19 
4178 05/04/19 
3099 05/03/19 
2330   --  136 132*  
K 2.8*  --  3.7 3.3*  
  --  101 98 CO2 24  --  24 24   --  117* 94 BUN 10  --  12 11 CREA 0.66  --  0.77 0.75 CA 7.9*  --  7.9* 8.3*  
MG 2.1 2.0 2.0  --   
ALB  --   --   --  3.0* TBILI  --   --   --  1.8* SGOT  --   --   --  46* ALT  --   --   --  25 Signed: Douglas Rowell MD

## 2019-05-06 NOTE — PROGRESS NOTES
Report received from [facility] at bedside. History of retention noted, Feliz remains indwelling. 0740 Assessment completed, Maxim Cowart helping with meds and activity this am.  Patient remains impulsive and is on bed alarm. 0900  Up to chair with help, remains unsteady on feet and requires direction. Feliz clamped to prepare for removal due to retention noted previously. 0930  Report given to [facility]. 1115  Report received from [facility], will remove Feliz soon. 200  Niece at bedside, aware of increased confusion. Unsure if this is from sleep deprivation or surgery. Remains in chair waiting on new lunch tray. 1250  Room not yet clean, [address]  will call when it is for report. 1405 Patient back to bed with minimal help. Still waiting to give report to floor.

## 2019-05-07 LAB
BACTERIA SPEC CULT: NORMAL
BACTERIA SPEC CULT: NORMAL
MAGNESIUM SERPL-MCNC: 2.1 MG/DL (ref 1.6–2.4)
SERVICE CMNT-IMP: NORMAL

## 2019-05-07 PROCEDURE — 36415 COLL VENOUS BLD VENIPUNCTURE: CPT

## 2019-05-07 PROCEDURE — 74011250636 HC RX REV CODE- 250/636: Performed by: SURGERY

## 2019-05-07 PROCEDURE — 74011250637 HC RX REV CODE- 250/637: Performed by: INTERNAL MEDICINE

## 2019-05-07 PROCEDURE — 97116 GAIT TRAINING THERAPY: CPT | Performed by: PHYSICAL THERAPIST

## 2019-05-07 PROCEDURE — 97535 SELF CARE MNGMENT TRAINING: CPT

## 2019-05-07 PROCEDURE — 74011250636 HC RX REV CODE- 250/636: Performed by: INTERNAL MEDICINE

## 2019-05-07 PROCEDURE — 74011250637 HC RX REV CODE- 250/637: Performed by: SURGERY

## 2019-05-07 PROCEDURE — 74011000258 HC RX REV CODE- 258: Performed by: INTERNAL MEDICINE

## 2019-05-07 PROCEDURE — 65270000029 HC RM PRIVATE

## 2019-05-07 PROCEDURE — 83735 ASSAY OF MAGNESIUM: CPT

## 2019-05-07 PROCEDURE — 94760 N-INVAS EAR/PLS OXIMETRY 1: CPT

## 2019-05-07 RX ORDER — SODIUM CHLORIDE AND POTASSIUM CHLORIDE .9; .15 G/100ML; G/100ML
SOLUTION INTRAVENOUS CONTINUOUS
Status: DISCONTINUED | OUTPATIENT
Start: 2019-05-07 | End: 2019-05-10 | Stop reason: HOSPADM

## 2019-05-07 RX ADMIN — Medication 10 ML: at 05:59

## 2019-05-07 RX ADMIN — CEFEPIME 2 G: 2 INJECTION, POWDER, FOR SOLUTION INTRAVENOUS at 17:57

## 2019-05-07 RX ADMIN — SODIUM CHLORIDE AND POTASSIUM CHLORIDE: 9; 1.49 INJECTION, SOLUTION INTRAVENOUS at 10:11

## 2019-05-07 RX ADMIN — VANCOMYCIN HYDROCHLORIDE 1000 MG: 1 INJECTION, POWDER, LYOPHILIZED, FOR SOLUTION INTRAVENOUS at 01:09

## 2019-05-07 RX ADMIN — SODIUM CHLORIDE AND POTASSIUM CHLORIDE: 9; 1.49 INJECTION, SOLUTION INTRAVENOUS at 22:27

## 2019-05-07 RX ADMIN — ALPRAZOLAM 0.5 MG: 0.5 TABLET ORAL at 22:24

## 2019-05-07 RX ADMIN — FLUOXETINE HYDROCHLORIDE 20 MG: 20 SOLUTION ORAL at 08:19

## 2019-05-07 RX ADMIN — CEFEPIME 2 G: 2 INJECTION, POWDER, FOR SOLUTION INTRAVENOUS at 09:41

## 2019-05-07 RX ADMIN — CEFEPIME 2 G: 2 INJECTION, POWDER, FOR SOLUTION INTRAVENOUS at 03:56

## 2019-05-07 RX ADMIN — QUETIAPINE FUMARATE 25 MG: 25 TABLET ORAL at 22:24

## 2019-05-07 RX ADMIN — ENOXAPARIN SODIUM 40 MG: 40 INJECTION SUBCUTANEOUS at 08:19

## 2019-05-07 RX ADMIN — POTASSIUM CHLORIDE: 2 INJECTION, SOLUTION, CONCENTRATE INTRAVENOUS at 01:12

## 2019-05-07 NOTE — PROGRESS NOTES
RAPID RESPONSE TEAM- Follow Up Rounded on patient due to recent transfer out of CCU (5/6/19). Discussed with primary RN, Lobito Moses. No acute concerns, VSS, MEWS 1. Patient continues to be confused,alert, and sitting in the bed. No RRT interventions indicated at this time. Please call with any questions or concerns. Heather Brizuela Rapid Response RN Pallavi Wilder

## 2019-05-07 NOTE — INTERDISCIPLINARY ROUNDS
Interdisciplinary Rounds were completed on this patient. Rounds included nursing, clinical care leader, pharmacy, and case management. Patient had the following concerns: patient still confused Goals for the day will include:  GI lite diet, PT, fall precautions, telesitter Anticipated discharge date: SNF?

## 2019-05-07 NOTE — PROGRESS NOTES
Admit Date: 2019 POD 7 Days Post-Op Procedure:  Procedure(s): LAPAROSCOPY GENERAL DIAGNOSTIC LYSIS OF ADHESIONS Subjective:  
 
Patient less confused today.  + loose BMs. No nausea or abdominal pain. Carlos po. Very weak. Objective:  
 
Blood pressure 115/45, pulse 78, temperature 98.4 °F (36.9 °C), resp. rate 18, height 5' 7\" (1.702 m), weight 165 lb 6.4 oz (75 kg), SpO2 94 %. Temp (24hrs), Av.6 °F (37 °C), Min:98.1 °F (36.7 °C), Max:99 °F (37.2 °C) Physical Exam:  GENERAL: alert, cooperative, no distress, appears stated age, LUNG: clear to auscultation bilaterally, HEART: regular rate and rhythm, ABDOMEN: soft, non-tender. Bowel sounds normal. Wounds c/d/i. EXTREMITIES:  extremities normal, atraumatic, no cyanosis or edema Labs:  
Recent Results (from the past 24 hour(s)) MAGNESIUM Collection Time: 19  4:32 AM  
Result Value Ref Range Magnesium 2.1 1.6 - 2.4 mg/dL Data Review images and reports reviewed Assessment:  
 
Active Problems: 
  SBO (small bowel obstruction) ([address] (2019) Plan/Recommendations/Medical Decision Making:  
 
Continue present treatment GI lite diet Presumed aspiration pneumonia, appreciate pulmonary assistance. Work with PT. Discharge planning. ? May require snf? Dheeraj Shafer. Helga Baker MD, [facility] [address] [facility]

## 2019-05-07 NOTE — PROGRESS NOTES
Plan: 
-Home with [facility] 
-PCP f/u appt CM f/u with [facility]. PCP unwilling to sign orders until pt seen in office post-d/c. CM PC to pt's dtr, Patricia, to discuss d/c plan. Patricia still preferring to take pt home rather than rehab due to being in a familiar environment. CM updated that [facility] will have to begin after MAR appt. CM will attempt to move appt up after d/c date more clear. Pt in need of RW. CM will continue to follow and assist with d/c planning. DANIEL Dahl Care Manager

## 2019-05-07 NOTE — PROGRESS NOTES
Pt refuses to cooperate with vitals, will not push further as pt is already not following prompts and confused

## 2019-05-07 NOTE — PROGRESS NOTES
OCCUPATIONAL THERAPY TREATMENT Patient: Kavon Carrera (75 y.o. female) Date: 5/7/2019 Diagnosis: SBO (small bowel obstruction) ([facility] 75.) [K56.609] <principal problem not specified> Procedure(s) (LRB): 
LAPAROSCOPY GENERAL DIAGNOSTIC LYSIS OF ADHESIONS (N/A) 7 Days Post-Op Precautions: Fall(confusion) Chart, occupational therapy assessment, plan of care, and goals were reviewed. ASSESSMENT: 
Patient is received in bed and is agreeable to participate with encouragement. She is oriented to self, with cues required to determine the date. She requires verbal cues for safe hand placement and body alignment prior to sit <--> stand and for safe rolling walker use during mobility (noted to attempt to abandon rw when approaching sink). In bathroom, patient performed toilet transfer with min assist and toileting tasks with supervision to Ruben Sarabia. At sink, patient required cues for location of soap and towels and demonstrated decreased awareness of rolling walker. Education provided on safe rw use in bathroom for fall prevention. Patient will require 24 hour assistance with [facility] follow up if discharged home. If not available, patient will require rehab. Progression toward goals: 
?       Improving appropriately and progressing toward goals ? Improving slowly and progressing toward goals ? Not making progress toward goals and plan of care will be adjusted PLAN: 
Patient continues to benefit from skilled intervention to address the above impairments. Continue treatment per established plan of care. Discharge Recommendations:  Home Health with 24 hour family assist; if not available then rehab Further Equipment Recommendations for Discharge:  TBD SUBJECTIVE:  
Patient stated I would really like to go to sleep.  RN cleared patient for participation. Patient agreeable with encouragement. OBJECTIVE DATA SUMMARY:  
Cognitive/Behavioral Status: 
Neurologic State: Alert;Confused Orientation Level: Oriented to person;Disoriented to time;Disoriented to place Cognition: Decreased attention/concentration;Decreased command following; Impaired decision making;Poor safety awareness(poor command following) Perception: Appears intact Perseveration: No perseveration noted Safety/Judgement: Decreased awareness of environment;Decreased awareness of need for assistance;Decreased awareness of need for safety;Decreased insight into deficits Functional Mobility and Transfers for ADLs: 
Bed Mobility: 
Rolling: Minimum assistance(verbal cues to sequence) Supine to Sit: Minimum assistance; Additional time;Assist x1 Sit to Supine: Minimum assistance;Assist x1(for LE management) Transfers: 
Sit to Stand: Contact guard assistance;Assist x1(verbal cues for hand placement) Functional Transfers Bathroom Mobility: Minimum assistance(verbal cues for rw management in small space) Toilet Transfer : Minimum assistance(assist for body alignment prior to sitting) Balance: 
Sitting: Intact Standing: Impaired Standing - Static: Constant support; Fair 
Standing - Dynamic : Constant support; Quin Hines ADL Intervention: 
Grooming (standing at sink with rolling walker) Grooming Assistance: Minimum assistance(verbal cues for sequencing and locating items) Washing Hands: Minimum assistance Cues: Tactile cues provided;Verbal cues provided;Visual cues provided Toileting Toileting Assistance: Contact guard assistance Bladder Hygiene: Supervision Clothing Management: Contact guard assistance Cues: Tactile cues provided;Verbal cues provided Adaptive Equipment: Grab bars; Lonzell Leavens Cognitive Retraining Safety/Judgement: Decreased awareness of environment;Decreased awareness of need for assistance;Decreased awareness of need for safety;Decreased insight into deficits Rolling Walker Safety: Educated patient about importance of keeping hands free at all times when using rolling walker for fall prevention. Provided information about walker bags/baskets/trays to assist with transporting items safely while in the home to prevent falls. Patient indicated understanding of same. Instructed patient on safe and appropriate hand placement during transfers (push up from sitting surface when standing up, reach back for sitting surface when sitting down, use grab bars, etc.), including never to pull up on rolling walker for fall prevention and safety. Instructed patient to push rolling walker up to surface (countertop, sink, etc.) and  it as opposed to abandoning rolling walker on the side for safety. Patient verbalized understanding of same. Pain: 
Pain Scale 1: Numeric (0 - 10) Pain Intensity 1: 0 Activity Tolerance:  
Patient tolerated activity well. Requested return to bed at end of session. After treatment:  
? Patient left in no apparent distress sitting up in chair ? Patient left in no apparent distress in bed 
? Call bell left within reach ? Nursing notified ? Caregiver present ? Bed alarm activated COMMUNICATION/COLLABORATION:  
The patients plan of care was discussed with: Registered Nurse Deepak Ponce OT Time Calculation: 20 mins

## 2019-05-07 NOTE — PROGRESS NOTES
General Surgery End of Shift Nursing Note Bedside shift change report given to Monica Cabral RN (oncoming nurse) by BERTHA GALVAN (offgoing nurse). Report included the following information SBAR, Kardex and MAR. Shift worked:   7a-7p Summary of shift:    The patient had a pleasant day, met with PT/OT for mobility, and also the risk management. Issues for physician to address:   Risk management felt it would be most appropriate for the MD to be involved with the [facility] tomorrow, as well as the pharmacist to review her medications with the patient and her family. The daughter's contact information is [facility](prefered)[phone] and [id number]. Number times ambulated in hallway past shift: 1 Number of times OOB to chair past shift: 3 Pain Management: 
Current medication: Morphine PRN Patient states pain is manageable on current pain medication: YES 
 
GI: 
 
Current diet:  DIET NUTRITIONAL SUPPLEMENTS No; Breakfast, Dinner; Ensure Clear DIET GI LITE (POST SURGICAL) Tolerating current diet: YES Passing flatus: YES Last Bowel Movement: today Appearance: Soft Respiratory: 
 
Incentive Spirometer at bedside: YES Patient instructed on use: YES Patient Safety: 
 
Falls Score: 4 Bed Alarm On? Yes Sitter? Yes Olivier William RN

## 2019-05-07 NOTE — PROGRESS NOTES
Problem: Mobility Impaired (Adult and Pediatric) Goal: *Acute Goals and Plan of Care (Insert Text) Description Physical Therapy Goals Initiated 5/1/2019 1. Patient will move from supine to sit and sit to supine , scoot up and down and roll side to side in bed with supervision/set-up within 7 day(s). 2.  Patient will transfer from bed to chair and chair to bed with minimal assistance/contact guard assist using the least restrictive device within 7 day(s). 3.  Patient will perform sit to stand with minimal assistance/contact guard assist within 7 day(s). 4.  Patient will ambulate with minimal assistance/contact guard assist for 50 feet with the least restrictive device within 7 day(s). Outcome: Progressing Towards Goal 
 PHYSICAL THERAPY TREATMENT Patient: Gianfranco Mcfadden (43 y.o. female) Date: 5/7/2019 Diagnosis: SBO (small bowel obstruction) ([facility] [K56.609] <principal problem not specified> Procedure(s) (LRB): 
LAPAROSCOPY GENERAL DIAGNOSTIC LYSIS OF ADHESIONS (N/A) 7 Days Post-Op Precautions: Fall(confusion) Chart, physical therapy assessment, plan of care and goals were reviewed. ASSESSMENT: 
Patient making steady progress toward goals. Patient making steady progress toward goals. Continues with mild confusion and impaired balance overall. Currently needing Reid x 1 for bed mobility and CGA for transfers. Amb approx 75 feet without an assistive device and CGA-Reid with minor path deviations and 1 LOB. Anticipate she will do much better with RW and daughter states that patient has one at home. Recommend  PT follow up with 24 hour supervision and use of RW for support. Prior level of function: Independent with mobility. Lives with daughter and family PLAN: 
Patient continues to benefit from skilled intervention to address the above impairments. Continue treatment per established plan of care. Discharge recommendations: Home health (to increase independence and safety) 24 supervision If above is not an option then recommend: Rehab at skilled nursing facility (SNF) (to regain functional baseline patient requires rehab) Patient's barriers to discharging home, in addition to above impairments: family availability to assist. 
 
Equipment recommendations for successful discharge (if) home: suggest she use RW. Has RW but lent it to family member. May need RW if she is to DC home SUBJECTIVE:  
Patient stated I used to have one but I lent it out.  OBJECTIVE DATA SUMMARY:  
Critical Behavior: 
Neurologic State: Confused, Alert Orientation Level: Oriented to person, Oriented to place, Disoriented to situation, Disoriented to time Cognition: Decreased attention/concentration, Decreased command following, Impaired decision making, Impulsive Safety/Judgement: Decreased awareness of need for assistance, Decreased awareness of environment, Decreased awareness of need for safety, Decreased insight into deficits Functional Mobility Training: 
Bed Mobility: 
  
Supine to Sit: Minimum assistance; Additional time;Assist x1 Transfers: 
Sit to Stand: Contact guard assistance;Assist x1 Stand to Sit: Contact guard assistance;Assist x1 Balance: 
Sitting: Intact Standing: Impaired Standing - Static: Constant support; Fair 
Standing - Dynamic : Constant support; Cornelius Tanvir Ambulation/Gait Training: 
Distance (ft): 75 Feet (ft) Assistive Device: Gait belt Ambulation - Level of Assistance: Minimal assistance; Additional time;Assist x1 Gait Abnormalities: Decreased step clearance; Path deviations Speed/Xochilt: Pace decreased (<100 feet/min); Slow Step Length: Left shortened;Right shortened Pain: 
No c/o pain Activity Tolerance:  
Good Please refer to the flowsheet for vital signs taken during this treatment. After treatment patient left:  
Up in chair Bed alarm/tab alert on 
Caregiver at bedside Call light within reach RN notified Family at bedside COMMUNICATION/COLLABORATION:  
The patients plan of care was discussed with: Physical Therapist, Occupational Therapist and Registered Nurse Gray Simmonds, PT, DPT Time Calculation: 20 mins

## 2019-05-07 NOTE — PROGRESS NOTES
Hospitalist Progress Note NAME: Brittany Ricardo :  1940 MRN:  [id number] Assessment / Plan: 
Acute metabolic encephalopathy - improving ? Aspiration pneumonia 
 etiology unclear likely multifactorial, polypharmacy vs dehydration Protonix, zosyn DC  2days ago Ct scan brain unremarkable , labs wnl . No bacterial  
Pt started on seroquel , her xanax was restarted then stopped , now is on xanax prn 
EEG : no seizures : 
Pt remains on Cefepime and Vanco. CXR does not show any evidence of PNA. WBC is wnl. Pt remains afebrile > 48 hours. MRSA Cx negative. BCx NGTD 3 days. Will discontinue Vancomycin. Will likely transition to oral abx for empiric therapy and course completion upon discharge. Hypokalemia - repleted Hyponatremia - resolved JOSH most likely prerenal - resolved Repeat labs tmr AM 
 
Anxiety: c/w  Xanax and  Prozac  
  
SBO s/p explorative laprascopy  With lysis of adhesions, Persistent SBO Care per surgery Repeat CT scan : 
Mild to moderate distention of proximal mid small bowel loops with transition 
to normal caliber distal small bowel. Diagnostic considerations include ileus 
and small bowel obstruction. Clinical correlation recommended. 2. Trace free peritoneal fluid. 3. Small bilateral pleural effusions. Body mass index is 25.91 kg/m². therefore classifying patient as overweight, NOS (body mass index [BMI] of 25 to 29.9 kg/m2) Code status: Full Prophylaxis: Lovenox Recommended Disposition: per surgery Subjective: Chief Complaint / Reason for Physician Visit Slightly confused but easily reoriented. Denies any complaints. Review of Systems: 
Symptom Y/N Comments  Symptom Y/N Comments Fever/Chills    Chest Pain n   
Poor Appetite    Edema Cough    Abdominal Pain n   
Sputum    Joint Pain SOB/ALARCON n   Pruritis/Rash Nausea/vomit n   Tolerating PT/OT Diarrhea    Tolerating Diet y GI lite Constipation    Other Could NOT obtain due to:   
 
Objective: VITALS:  
Last 24hrs VS reviewed since prior progress note. Most recent are: 
Patient Vitals for the past 24 hrs: 
 Temp Pulse Resp BP SpO2  
05/07/19 0730 98.4 °F (36.9 °C) 78 18 115/45 94 % 05/06/19 1901 99 °F (37.2 °C) 80 20 122/46 93 % 05/06/19 1459 98.7 °F (37.1 °C) 85 20 123/51 94 % 05/06/19 1430  80 25    
05/06/19 1400  95 22 102/51 96 % 05/06/19 1325  94 19  99 % 05/06/19 1324  97 17  97 % 05/06/19 1143  100 19 99/46   
05/06/19 1120 98.1 °F (36.7 °C) (!) 101 18 (!) 90/38 99 % Intake/Output Summary (Last 24 hours) at 5/7/2019 1008 Last data filed at 5/7/2019 0740 Gross per 24 hour Intake 3451.25 ml Output 475 ml Net 2976.25 ml PHYSICAL EXAM: 
General: WD, WN. Alert, cooperative, no acute distress   
EENT:  EOMI. Anicteric sclerae. MMM Resp:  CTA bilaterally, no wheezing or rales. No accessory muscle use CV:  Regular  rhythm,  No edema GI:  Soft, + distended, Non tender.  no BS heard Neurologic:  Alert and oriented X 3, normal speech, Psych:   Agitated Skin:  No rashes. No jaundice Reviewed most current lab test results and cultures  YES Reviewed most current radiology test results   YES Review and summation of old records today    NO Reviewed patient's current orders and MAR    YES 
PMH/SH reviewed - no change compared to H&P 
________________________________________________________________________ Care Plan discussed with: 
  Comments Patient y Family RN y   
Care Manager Consultant Multidiciplinary team rounds were held today with , nursing, pharmacist and clinical coordinator. Patient's plan of care was discussed; medications were reviewed and discharge planning was addressed. ________________________________________________________________________ Total NON critical care TIME:  20   Minutes Total CRITICAL CARE TIME Spent:   Minutes non procedure based Comments >50% of visit spent in counseling and coordination of care    
________________________________________________________________________ Sally Martinez MD  
 
Procedures: see electronic medical records for all procedures/Xrays and details which were not copied into this note but were reviewed prior to creation of Plan. LABS: 
I reviewed today's most current labs and imaging studies. Pertinent labs include: 
Recent Labs 05/06/19 
0345 WBC 9.3 HGB 11.8 HCT 33.5*  
 Recent Labs 05/07/19 
0839 05/06/19 
0345 05/05/19 
6636 NA  --  137  --   
K  --  2.8*  --   
CL  --  103  --   
CO2  --  24  --   
GLU  --  100  --   
BUN  --  10  --   
CREA  --  0.66  --   
CA  --  7.9*  --   
MG 2.1 2.1 2.0 Signed: Sally Martinez MD

## 2019-05-08 LAB
ANION GAP SERPL CALC-SCNC: 8 MMOL/L (ref 5–15)
BUN SERPL-MCNC: 7 MG/DL (ref 6–20)
BUN/CREAT SERPL: 10 (ref 12–20)
CALCIUM SERPL-MCNC: 7.8 MG/DL (ref 8.5–10.1)
CHLORIDE SERPL-SCNC: 110 MMOL/L (ref 97–108)
CO2 SERPL-SCNC: 23 MMOL/L (ref 21–32)
COMMENT, HOLDF: NORMAL
CREAT SERPL-MCNC: 0.69 MG/DL (ref 0.55–1.02)
GLUCOSE SERPL-MCNC: 94 MG/DL (ref 65–100)
MAGNESIUM SERPL-MCNC: 1.9 MG/DL (ref 1.6–2.4)
PHOSPHATE SERPL-MCNC: 2.4 MG/DL (ref 2.6–4.7)
POTASSIUM SERPL-SCNC: 3.9 MMOL/L (ref 3.5–5.1)
SAMPLES BEING HELD,HOLD: NORMAL
SODIUM SERPL-SCNC: 141 MMOL/L (ref 136–145)

## 2019-05-08 PROCEDURE — 80048 BASIC METABOLIC PNL TOTAL CA: CPT

## 2019-05-08 PROCEDURE — 74011250636 HC RX REV CODE- 250/636: Performed by: SURGERY

## 2019-05-08 PROCEDURE — 97535 SELF CARE MNGMENT TRAINING: CPT | Performed by: OCCUPATIONAL THERAPIST

## 2019-05-08 PROCEDURE — 74011000258 HC RX REV CODE- 258: Performed by: INTERNAL MEDICINE

## 2019-05-08 PROCEDURE — 83735 ASSAY OF MAGNESIUM: CPT

## 2019-05-08 PROCEDURE — 65270000029 HC RM PRIVATE

## 2019-05-08 PROCEDURE — 74011250637 HC RX REV CODE- 250/637: Performed by: INTERNAL MEDICINE

## 2019-05-08 PROCEDURE — 36415 COLL VENOUS BLD VENIPUNCTURE: CPT

## 2019-05-08 PROCEDURE — 97116 GAIT TRAINING THERAPY: CPT

## 2019-05-08 PROCEDURE — 74011250637 HC RX REV CODE- 250/637: Performed by: GENERAL ACUTE CARE HOSPITAL

## 2019-05-08 PROCEDURE — 74011250637 HC RX REV CODE- 250/637: Performed by: SURGERY

## 2019-05-08 PROCEDURE — 74011250636 HC RX REV CODE- 250/636: Performed by: INTERNAL MEDICINE

## 2019-05-08 PROCEDURE — 97530 THERAPEUTIC ACTIVITIES: CPT

## 2019-05-08 PROCEDURE — 84100 ASSAY OF PHOSPHORUS: CPT

## 2019-05-08 RX ORDER — SODIUM,POTASSIUM PHOSPHATES 280-250MG
1 POWDER IN PACKET (EA) ORAL 4 TIMES DAILY
Status: DISPENSED | OUTPATIENT
Start: 2019-05-08 | End: 2019-05-10

## 2019-05-08 RX ORDER — AMOXICILLIN AND CLAVULANATE POTASSIUM 875; 125 MG/1; MG/1
1 TABLET, FILM COATED ORAL EVERY 12 HOURS
Status: DISCONTINUED | OUTPATIENT
Start: 2019-05-08 | End: 2019-05-10 | Stop reason: HOSPADM

## 2019-05-08 RX ADMIN — SODIUM CHLORIDE AND POTASSIUM CHLORIDE: 9; 1.49 INJECTION, SOLUTION INTRAVENOUS at 16:15

## 2019-05-08 RX ADMIN — CEFEPIME 2 G: 2 INJECTION, POWDER, FOR SOLUTION INTRAVENOUS at 01:17

## 2019-05-08 RX ADMIN — ALPRAZOLAM 0.5 MG: 0.5 TABLET ORAL at 23:04

## 2019-05-08 RX ADMIN — POTASSIUM & SODIUM PHOSPHATES POWDER PACK 280-160-250 MG 1 PACKET: 280-160-250 PACK at 09:52

## 2019-05-08 RX ADMIN — AMOXICILLIN AND CLAVULANATE POTASSIUM 1 TABLET: 875; 125 TABLET, FILM COATED ORAL at 13:44

## 2019-05-08 RX ADMIN — POTASSIUM & SODIUM PHOSPHATES POWDER PACK 280-160-250 MG 1 PACKET: 280-160-250 PACK at 17:27

## 2019-05-08 RX ADMIN — Medication 10 ML: at 23:05

## 2019-05-08 RX ADMIN — Medication 10 ML: at 01:18

## 2019-05-08 RX ADMIN — SODIUM CHLORIDE AND POTASSIUM CHLORIDE: 9; 1.49 INJECTION, SOLUTION INTRAVENOUS at 06:54

## 2019-05-08 RX ADMIN — FLUOXETINE HYDROCHLORIDE 20 MG: 20 SOLUTION ORAL at 09:52

## 2019-05-08 RX ADMIN — Medication 10 ML: at 09:53

## 2019-05-08 RX ADMIN — POTASSIUM & SODIUM PHOSPHATES POWDER PACK 280-160-250 MG 1 PACKET: 280-160-250 PACK at 23:05

## 2019-05-08 RX ADMIN — AMOXICILLIN AND CLAVULANATE POTASSIUM 1 TABLET: 875; 125 TABLET, FILM COATED ORAL at 23:04

## 2019-05-08 RX ADMIN — ENOXAPARIN SODIUM 40 MG: 40 INJECTION SUBCUTANEOUS at 09:51

## 2019-05-08 RX ADMIN — QUETIAPINE FUMARATE 25 MG: 25 TABLET ORAL at 23:04

## 2019-05-08 NOTE — PROGRESS NOTES
PHYSICAL THERAPY TREATMENT: WEEKLY REASSESSMENT Patient: Hannah Rueda (87 y.o. female) Date: 5/8/2019 Diagnosis: SBO (small bowel obstruction) ([facility] 75.) [K56.609] <principal problem not specified> Procedure(s) (LRB): 
LAPAROSCOPY GENERAL DIAGNOSTIC LYSIS OF ADHESIONS (N/A) 8 Days Post-Op Precautions: Fall(confusion) Chart, physical therapy assessment, plan of care and goals were reviewed. ASSESSMENT: 
Patient progressing overall towards goals with improved independence with bed mobility, transfers, and additional gait endurance. She remains somewhat confused and believed that lunch had been served in the am. She was able to gait train x150' using a RW with CGA and additional time. Good overall melany using the walker but cued to void flexed posture. She preferred to avoid the RW to complete a bathroom transfer post session with noted increase in trunk sway and path deviations without support via DME. Patient's progression toward goals since last assessment: Patient met her sit to stand and gait distance goals. Advanced for the coming week PLAN: 
Goals have been updated based on progression since last assessment. Patient continues to benefit from skilled intervention to address the above impairments. Continue to follow the patient 4 times a week to address goals. Planned Interventions: 
?              Bed Mobility Training             ? Neuromuscular Re-Education ? Transfer Training                   ? Orthotic/Prosthetic Training 
? Gait Training                         ? Modalities ? Therapeutic Exercises           ? Edema Management/Control ? Therapeutic Activities            ? Patient and Family Training/Education ? Other (comment): 
Discharge Recommendations: Home Health and additional family support in home Further Equipment Recommendations for Discharge: rolling walker if she doesn't own SUBJECTIVE:  
Patient stated ? I know why they are keeping me attached to the IV. It is to keep me confined OBJECTIVE DATA SUMMARY:  
Critical Behavior: 
Neurologic State: Alert, Confused Orientation Level: Oriented to place, Oriented to person Cognition: Decreased attention/concentration Safety/Judgement: Decreased awareness of environment, Decreased awareness of need for assistance, Decreased awareness of need for safety, Decreased insight into deficits Strength:  
  
  
  
  
  
  
  
 
Functional Mobility Training: 
Bed Mobility: 
Rolling: Contact guard assistance Supine to Sit: Minimum assistance Sit to Supine: Contact guard assistance Transfers: 
Sit to Stand: Contact guard assistance Stand to Sit: Contact guard assistance Balance: 
Sitting: Intact Sitting - Static: Good (unsupported) Sitting - Dynamic: Fair (occasional) Standing: Impaired Standing - Static: Fair Standing - Dynamic : Fair;Constant support Ambulation/Gait Training: 
Distance (ft): 150 Feet (ft) Assistive Device: Gait belt;Walker, rolling Ambulation - Level of Assistance: Contact guard assistance Gait Abnormalities: Path deviations Speed/Xochilt: Pace decreased (<100 feet/min) Step Length: Left shortened;Right shortened Stairs: 
  
  
  
Neuro Re-Education: 
Therapeutic Exercises:  
 
Pain: 
Pain Scale 1: Numeric (0 - 10) Pain Intensity 1: 0 Activity Tolerance:  
Please refer to the flowsheet for vital signs taken during this treatment. After treatment:  
?  Patient left in no apparent distress sitting up in chair ? Patient left in no apparent distress in bed (patient deferred chair) ? Call bell left within reach ? Nursing notified ? Caregiver present ? Bed alarm activated COMMUNICATION/COLLABORATION:  
The patient?s plan of care was discussed with: Registered Nurse Denis Vanessa, PT, DPT Time Calculation: 29 mins

## 2019-05-08 NOTE — PROGRESS NOTES
RAPID RESPONSE TEAM- Follow Up Rounded on patient due to recent transfer out of CCU (5/6/19). Discussed with primary RN, Andrew Lynn. No acute concerns, VSS. RN at bedside, patient with some confusion requiring tele-sitter. No RRT interventions indicated at this time. Please call with any questions or concerns. Gus Lyons Rapid Response RN Alyssa Griffin

## 2019-05-08 NOTE — PROGRESS NOTES
General Surgery End of Shift Nursing Note Bedside shift change report given to damion (oncoming nurse) by Mabel Hawk (offgoing nurse). Report included the following information SBAR, Kardex, Intake/Output, MAR and Recent Results. Shift worked:   D Summary of shift:    0 Issues for physician to address:   0 Number times ambulated in hallway past shift: 0 Number of times OOB to chair past shift: 1 Pain Management: 
Current medication: 0 Patient states pain is manageable on current pain medication: YES 
 
GI: 
 
Current diet:  DIET NUTRITIONAL SUPPLEMENTS No; Breakfast, Dinner; Ensure Clear DIET GI LITE (POST SURGICAL) Tolerating current diet: YES Passing flatus: YES Last Bowel Movement: yesterday Appearance: 0 Respiratory: 
 
Incentive Spirometer at bedside: YES Patient instructed on use: YES Patient Safety: 
 
Falls Score: 1 Bed Alarm On? yhes Sitter? tele Zeny Jean RN

## 2019-05-08 NOTE — PROGRESS NOTES
General Surgery End of Shift Nursing Note Bedside shift change report given to Day shift nurse (oncoming nurse) by Isra Gilliland (offgoing nurse). Report included the following information SBAR. Shift worked:   Wolf Perla Summary of shift:    Resting throughout the night but got up to use the bathroom frequently Issues for physician to address:   Discuss medications during interdisciplinary rounds Number times ambulated in hallway past shift: 1 Number of times OOB to chair past shift: 1 Pain Management: 
Current medication: morphine (did not request) Patient states pain is manageable on current pain medication: YES 
 
GI: 
 
Current diet:  DIET NUTRITIONAL SUPPLEMENTS No; Breakfast, Dinner; Ensure Clear DIET GI LITE (POST SURGICAL) Tolerating current diet: YES Passing flatus: YES Last Bowel Movement: today Appearance: soft Respiratory: 
 
Incentive Spirometer at bedside: YES Patient instructed on use: YES Patient Safety: 
 
Falls Score: 5 Bed Alarm On? Yes Sitter? DMITRIY Vargas

## 2019-05-08 NOTE — PROGRESS NOTES
General Surgery End of Shift Nursing Note Bedside shift change report given to Denzel Elena (oncoming nurse) by Reilly Campo (offgoing nurse). Report included the following information SBAR, Kardex, Intake/Output, MAR and Recent Results. Shift worked:   3 pm to 7 pm  
Summary of shift:    Pt worked with therapy. No complaints. Pt is tolerating diet. Issues for physician to address:   none Number times ambulated in hallway past shift: 0 Number of times OOB to chair past shift: 1 Pain Management: 
Current medication: tylenol and morphine Patient states pain is manageable on current pain medication: no requests GI: 
 
Current diet:  DIET NUTRITIONAL SUPPLEMENTS No; Breakfast, Dinner; Ensure Clear DIET GI LITE (POST SURGICAL) Tolerating current diet: YES Passing flatus: YES Last Bowel Movement: yesterday Respiratory: 
 
Incentive Spirometer at bedside: YES Patient instructed on use: YES Patient Safety: 
 
Falls Score: 4 Bed Alarm On? Yes Sitter? No; telesitter Cady Boucher

## 2019-05-08 NOTE — PROGRESS NOTES
Admit Date: 2019 POD 8 Days Post-Op Procedure:  Procedure(s): LAPAROSCOPY GENERAL DIAGNOSTIC LYSIS OF ADHESIONS Subjective:  
 
Patient less confused today.  + loose BMs. No nausea or abdominal pain. Carlos po. Still very weak. Objective:  
 
Blood pressure 144/67, pulse 81, temperature 97.9 °F (36.6 °C), resp. rate 16, height 5' 7\" (1.702 m), weight 165 lb 6.4 oz (75 kg), SpO2 93 %. Temp (24hrs), Av.5 °F (36.9 °C), Min:97.9 °F (36.6 °C), Max:99.1 °F (37.3 °C) Physical Exam:  GENERAL: alert, cooperative, no distress, appears stated age, LUNG: clear to auscultation bilaterally, HEART: regular rate and rhythm, ABDOMEN: soft, non-tender. Bowel sounds normal. Wounds c/d/i. EXTREMITIES:  extremities normal, atraumatic, no cyanosis or edema Labs:  
Recent Results (from the past 24 hour(s)) METABOLIC PANEL, BASIC Collection Time: 19  5:29 AM  
Result Value Ref Range Sodium 141 136 - 145 mmol/L Potassium 3.9 3.5 - 5.1 mmol/L Chloride 110 (H) 97 - 108 mmol/L  
 CO2 23 21 - 32 mmol/L Anion gap 8 5 - 15 mmol/L Glucose 94 65 - 100 mg/dL BUN 7 6 - 20 MG/DL Creatinine 0.69 0.55 - 1.02 MG/DL  
 BUN/Creatinine ratio 10 (L) 12 - 20 GFR est AA >60 >60 ml/min/1.73m2 GFR est non-AA >60 >60 ml/min/1.73m2 Calcium 7.8 (L) 8.5 - 10.1 MG/DL MAGNESIUM Collection Time: 19  5:29 AM  
Result Value Ref Range Magnesium 1.9 1.6 - 2.4 mg/dL PHOSPHORUS Collection Time: 19  5:29 AM  
Result Value Ref Range Phosphorus 2.4 (L) 2.6 - 4.7 MG/DL  
SAMPLES BEING HELD Collection Time: 19  5:29 AM  
Result Value Ref Range SAMPLES BEING HELD LV   
 COMMENT Add-on orders for these samples will be processed based on acceptable specimen integrity and analyte stability, which may vary by analyte. Data Review images and reports reviewed Assessment:  
 
Active Problems: 
  SBO (small bowel obstruction) ([facility] [address] (2019) Plan/Recommendations/Medical Decision Making:  
 
Continue present treatment GI lite diet Presumed aspiration pneumonia, appreciate pulmonary assistance. Work with PT. Discharge planning. ? May require snf? Ed León. Holly Doyle MD, [facility]. [facility]

## 2019-05-08 NOTE — PROGRESS NOTES
Problem: Self Care Deficits Care Plan (Adult) Goal: *Acute Goals and Plan of Care (Insert Text) Description Occupational Therapy Goals Initiated 5/1/2019 1. Patient will perform grooming in sitting with supervision/set-up within 7 day(s). 5/8/2019 Progress to groom in standing with S. 
2.  Patient will perform self-feeding with supervision/set-up within 7 day(s). 5/8/2019 continue goal 
3. Patient will perform upper body dressing with supervision/set-up within 7 day(s). 5/8/2019 continue goal 
4. Patient will perform toilet transfers to [facility] with moderate assistance  within 7 day(s). 5/8/2019 upgrade to supervision 5. Patient will perform all aspects of toileting with moderate assistance  within 7 day(s). 5/8/2019 upgrade to Supervision 6. Patient will participate in [facility] within 7 day(s). 5/8/2019 Achieved Add goal: 7. Patient will ambulate into bathroom with supervision using best equipment within 7 days. 8.  Patient will  Tolerate standing adls for at least 10 minutes demonstrating safe technique within 7 days. Outcome: Progressing Towards Goal 
 OCCUPATIONAL THERAPY TREATMENT: WEEKLY REASSESSMENT Patient: Roger Schmitt (55 y.o. female) Date: 5/8/2019 Diagnosis: [facility]BO [facility] obstruction) ([facility] [K56.609] <principal problem not specified> Procedure(s) (LRB): 
LAPAROSCOPY GENERAL DIAGNOSTIC LYSIS OF ADHESIONS (N/A) 8 Days Post-Op Precautions: Fall(confusion) Chart, occupational therapy assessment, plan of care, and goals were reviewed. ASSESSMENT: 
Pt participated in self care activities with S/CGA and performed mobility with minimal assistance/CGA this date. Pt demonstrates poor safety awareness, impaired memory and generally slowed cognitive functioning. Pt performed the [facility] cognitive screening test and scored 14/30 indicating severe cognitive impairment. Pt will need rehab at discharge to maximize independence and safety during adls and mobility. Progression toward goals: ?            Improving appropriately and progressing toward goals ? Improving slowly and progressing toward goals ? Not making progress toward goals and plan of care will be adjusted PLAN: 
Goals have been updated based on progression since last assessment. Patient continues to benefit from skilled intervention to address the above impairments. Continue to follow patient 4 times a week to address goals. Planned Interventions: 
?                    Self Care Training                  ? Therapeutic Activities ? Functional Mobility Training    ? Cognitive Retraining 
? Therapeutic Exercises           ? Endurance Activities ? Balance Training                   ? Neuromuscular Re-Education ? Visual/Perceptual Training     ? Home Safety Training 
? Patient Education                 ? Family Training/Education ? Other (comment): 
Discharge Recommendations: Rehab Further Equipment Recommendations for Discharge: tbd SUBJECTIVE:  
Patient stated ? I've always had a bad memory. ? OBJECTIVE DATA SUMMARY:  
Cognitive/Behavioral Status: 
Neurologic State: Alert Orientation Level: Oriented to person;Oriented to place;Oriented to time;Disoriented to situation Cognition: Follows commands Perception: Appears intact Perseveration: No perseveration noted Safety/Judgement: Decreased awareness of environment;Decreased awareness of need for assistance;Decreased awareness of need for safety; Fall prevention Functional Mobility and Transfers for ADLs: 
Bed Mobility: 
Rolling: Supervision Supine to Sit: Minimum assistance Sit to Supine: Supervision;Stand-by assistance Transfers: 
Sit to Stand: Contact guard assistance Functional Transfers Bathroom Mobility: Minimum assistance Toilet Transfer : Contact guard assistance Adaptive Equipment: Grab bars; Jony Segovia (comment) Balance: 
Sitting: Intact Sitting - Static: Good (unsupported) Sitting - Dynamic: Fair (occasional) Standing: Impaired Standing - Static: Fair Standing - Dynamic : Fair;Constant support ADL Intervention: Toileting Toileting Assistance: Supervision Bladder Hygiene: Supervision Clothing Management: Minimum assistance Cues: Tactile cues provided Adaptive Equipment: Grab bars; Walker(poor safety aWareness with RW) Cognitive Retraining Orientation Retraining: Reorienting;Time 
Problem Solving: Identifying the problem Executive Functions: ([address] testing indicated poor score. Overall 14/30 score on M) Attention to Task: Single task Safety/Judgement: Decreased awareness of environment;Decreased awareness of need for assistance;Decreased awareness of need for safety; Fall prevention Cues: Verbal cues provided;Visual cues provided [place] Cognitive Assessment [facility]): 
 
 
 Patient evaluated with [place] Cognitive Assessment [facility]) to assess cognition in areas of visuospatial, executive, naming, memory, attention, language, abstraction, delayed recall, orientation. Patient scored 14/30 which indicates severe cognitive impairment on this test.  . Normal score is greater than or equal to 26/30. Patient with low score in the areas of visuospatial executive, memory/delayed recall, attention, language, 
  
  
 
  
Pain: 
Pain Scale 1: Numeric (0 - 10) Pain Intensity 1: 0 Activity Tolerance: No complaints After treatment:  
? Patient left in no apparent distress sitting up in chair ? Patient left in no apparent distress in bed 
? Call bell left within reach ? Nursing notified ? Caregiver present ? Bed alarm activated COMMUNICATION/COLLABORATION:  
The patient?s plan of care was discussed with: Registered Nurse Shalini Leahy, OTR/L 
 Time Calculation: 39 mins

## 2019-05-09 LAB
BACTERIA SPEC CULT: NORMAL
MAGNESIUM SERPL-MCNC: 1.8 MG/DL (ref 1.6–2.4)
SERVICE CMNT-IMP: NORMAL

## 2019-05-09 PROCEDURE — 65270000029 HC RM PRIVATE

## 2019-05-09 PROCEDURE — 36415 COLL VENOUS BLD VENIPUNCTURE: CPT

## 2019-05-09 PROCEDURE — 74011250636 HC RX REV CODE- 250/636: Performed by: SURGERY

## 2019-05-09 PROCEDURE — 97535 SELF CARE MNGMENT TRAINING: CPT | Performed by: OCCUPATIONAL THERAPIST

## 2019-05-09 PROCEDURE — 74011250637 HC RX REV CODE- 250/637: Performed by: SURGERY

## 2019-05-09 PROCEDURE — 97116 GAIT TRAINING THERAPY: CPT | Performed by: PHYSICAL THERAPIST

## 2019-05-09 PROCEDURE — 83735 ASSAY OF MAGNESIUM: CPT

## 2019-05-09 PROCEDURE — 94760 N-INVAS EAR/PLS OXIMETRY 1: CPT

## 2019-05-09 PROCEDURE — 74011250637 HC RX REV CODE- 250/637: Performed by: GENERAL ACUTE CARE HOSPITAL

## 2019-05-09 RX ADMIN — FLUOXETINE HYDROCHLORIDE 20 MG: 20 SOLUTION ORAL at 09:47

## 2019-05-09 RX ADMIN — Medication 1 CAPSULE: at 09:48

## 2019-05-09 RX ADMIN — POTASSIUM & SODIUM PHOSPHATES POWDER PACK 280-160-250 MG 1 PACKET: 280-160-250 PACK at 13:01

## 2019-05-09 RX ADMIN — Medication 1 CAPSULE: at 17:45

## 2019-05-09 RX ADMIN — SODIUM CHLORIDE AND POTASSIUM CHLORIDE: 9; 1.49 INJECTION, SOLUTION INTRAVENOUS at 10:34

## 2019-05-09 RX ADMIN — POTASSIUM & SODIUM PHOSPHATES POWDER PACK 280-160-250 MG 1 PACKET: 280-160-250 PACK at 17:45

## 2019-05-09 RX ADMIN — SODIUM CHLORIDE AND POTASSIUM CHLORIDE: 9; 1.49 INJECTION, SOLUTION INTRAVENOUS at 01:09

## 2019-05-09 RX ADMIN — POTASSIUM & SODIUM PHOSPHATES POWDER PACK 280-160-250 MG 1 PACKET: 280-160-250 PACK at 21:51

## 2019-05-09 RX ADMIN — SODIUM CHLORIDE AND POTASSIUM CHLORIDE: 9; 1.49 INJECTION, SOLUTION INTRAVENOUS at 20:30

## 2019-05-09 RX ADMIN — POTASSIUM & SODIUM PHOSPHATES POWDER PACK 280-160-250 MG 1 PACKET: 280-160-250 PACK at 09:47

## 2019-05-09 RX ADMIN — AMOXICILLIN AND CLAVULANATE POTASSIUM 1 TABLET: 875; 125 TABLET, FILM COATED ORAL at 09:47

## 2019-05-09 RX ADMIN — AMOXICILLIN AND CLAVULANATE POTASSIUM 1 TABLET: 875; 125 TABLET, FILM COATED ORAL at 21:51

## 2019-05-09 RX ADMIN — ENOXAPARIN SODIUM 40 MG: 40 INJECTION SUBCUTANEOUS at 09:48

## 2019-05-09 RX ADMIN — Medication 10 ML: at 05:17

## 2019-05-09 RX ADMIN — Medication 10 ML: at 17:46

## 2019-05-09 RX ADMIN — Medication 10 ML: at 21:52

## 2019-05-09 NOTE — PROGRESS NOTES
Problem: Self Care Deficits Care Plan (Adult) Goal: *Acute Goals and Plan of Care (Insert Text) Description Occupational Therapy Goals Initiated 5/1/2019 1. Patient will perform grooming in sitting with supervision/set-up within 7 day(s). 5/8/2019 Progress to groom in standing with S. 
2.  Patient will perform self-feeding with supervision/set-up within 7 day(s). 5/8/2019 continue goal 
3. Patient will perform upper body dressing with supervision/set-up within 7 day(s). 5/8/2019 continue goal 
4. Patient will perform toilet transfers to [facility] with moderate assistance  within 7 day(s). 5/8/2019 upgrade to supervision 5. Patient will perform all aspects of toileting with moderate assistance  within 7 day(s). 5/8/2019 upgrade to Supervision 6. Patient will participate in [facility] within 7 day(s). 5/8/2019 Achieved Add goal: 7. Patient will ambulate into bathroom with supervision using best equipment within 7 days. 8.  Patient will  Tolerate standing adls for at least 10 minutes demonstrating safe technique within 7 days. Outcome: Progressing Towards Goal 
 OCCUPATIONAL THERAPY TREATMENT Patient: Munir Bautista (86 y.o. female) Date: 5/9/2019 Diagnosis: [facility] [K56.609] <principal problem not specified> Procedure(s) (LRB): 
LAPAROSCOPY GENERAL DIAGNOSTIC LYSIS OF ADHESIONS (N/A) 9 Days Post-Op Precautions: Fall(confusion) Chart, occupational therapy assessment, plan of care, and goals were reviewed. ASSESSMENT: 
Patient is making progress in self-care. She continues to present with a flat affect, but is slightly more clear cognitively today. Provided challenges throughout for memory and she was able to remember at least 75% of the details after 5 minutes. Patient still requires intermittent cues throughout for safety and problem-solving. She tends to carry the walker also and needs cues to put it down on the floor.   She actually did okay without the RW with CGA during ADL. Patient indicates that her daughter will be available 24/7. If this is the case, and she will have 24 hour supervision, she will be safe to return home with home health OT. If not, continue to recommend SNF for rehab. Progression toward goals: 
?       Improving appropriately and progressing toward goals ? Improving slowly and progressing toward goals ? Not making progress toward goals and plan of care will be adjusted PLAN: 
Patient continues to benefit from skilled intervention to address the above impairments. Continue treatment per established plan of care. Discharge Recommendations:  Home with 24 hour supervision if available, otherwise SNF Further Equipment Recommendations for Discharge:  None anticipated SUBJECTIVE:  
Patient stated ?2103.?  (She was able to remember her room number and locate her room after walking in the arroyo.) OBJECTIVE DATA SUMMARY:  
Cognitive/Behavioral Status: 
Neurologic State: Alert Orientation Level: Disoriented to time;Oriented to person;Oriented to place Cognition: Follows commands Perception: Appears intact Perseveration: No perseveration noted Safety/Judgement: Awareness of environment;Decreased awareness of need for safety;Decreased awareness of need for assistance Functional Mobility and Transfers for ADLs: 
Bed Mobility: 
Rolling: Supervision Supine to Sit: Supervision Transfers: 
Sit to Stand: Stand-by assistance Functional Transfers Bathroom Mobility: Contact guard assistance Toilet Transfer : Stand-by assistance Adaptive Equipment: Grab bars Bed to Chair: Contact guard assistance Balance: 
Sitting: Intact Sitting - Static: Good (unsupported) Sitting - Dynamic: Good (unsupported) Standing: Impaired Standing - Static: Good;Constant support Standing - Dynamic : Good(initially fair) ADL Intervention: 
  
 
Grooming Washing Face: Stand-by assistance Washing Hands: Stand-by assistance(Additional time) Brushing/Combing Hair: Stand-by assistance Cues: Verbal cues provided Lower Body Dressing Assistance Socks: Contact guard assistance;Proximal stability Position Performed: Seated edge of bed Cues: Verbal cues provided Toileting Bladder Hygiene: Supervision Clothing Management: Stand-by assistance Cues: Verbal cues provided Adaptive Equipment: Walker;Grab bars(verbal cues) Cognitive Retraining Orientation Retraining: Time Organizing/Sequencing: Breaking task down Attention to Task: Single task Maintains Attention For (Time): 1 minute Following Commands: Follows one step commands/directions Safety/Judgement: Awareness of environment;Decreased awareness of need for safety;Decreased awareness of need for assistance Cues: Verbal cues provided;Visual cues provided Pain: No complaints Activity Tolerance:  
Fair After treatment:  
? Patient left in no apparent distress sitting up in chair ? Patient left in no apparent distress in bed 
? Call bell left within reach ? Nursing notified ? Caregiver present ? Bed alarm activated COMMUNICATION/COLLABORATION:  
The patient?s plan of care was discussed with: Physical Therapist and Registered Nurse Yasir Stanford OTR/L Time Calculation: 19 mins

## 2019-05-09 NOTE — PROGRESS NOTES
General Surgery End of Shift Nursing Note Bedside shift change report given to Nury Bran (oncoming nurse) by Yakelin Willett (offgoing nurse). Report included the following information SBAR. Shift worked:   7a-7p Summary of shift:    Patient up to chair for all meals, ambulated the arroyo with the PCT, used the bathroom instead of the beside commode. Patient ate most of each meal today. Tele sitter d/c this morning in preparation for discharge to rehab tomorrow. Issues for physician to address:   See above Number times ambulated in hallway past shift: 1 Number of times OOB to chair past shift: 3 Pain Management: 
Current medication: see mar Patient states pain is manageable on current pain medication: YES 
 
GI: 
 
Current diet:  DIET NUTRITIONAL SUPPLEMENTS No; Breakfast, Dinner; Ensure Clear DIET GI LITE (POST SURGICAL) Tolerating current diet: YES Passing flatus: NO 
Last Bowel Movement: today Appearance: small amount, loose Respiratory: 
 
Incentive Spirometer at bedside: YES Patient instructed on use: YES Patient Safety: 
 
Falls Score: 4 Bed Alarm On? Yes Sitter? No 
 
Rodolfo Severs

## 2019-05-09 NOTE — PROGRESS NOTES
Admit Date: 2019 POD 9 Days Post-Op Procedure:  Procedure(s): LAPAROSCOPY GENERAL DIAGNOSTIC LYSIS OF ADHESIONS Subjective:  
 
Patient more at baseline MS today. C/o loose stools. Objective:  
 
Blood pressure 100/52, pulse 77, temperature 97.9 °F (36.6 °C), resp. rate 16, height 5' 7\" (1.702 m), weight 157 lb 6.5 oz (71.4 kg), SpO2 94 %. Temp (24hrs), Av.2 °F (36.8 °C), Min:97.9 °F (36.6 °C), Max:98.7 °F (37.1 °C) Physical Exam:  GENERAL: alert, cooperative, no distress, appears stated age, LUNG: clear to auscultation bilaterally, HEART: regular rate and rhythm, ABDOMEN: soft, non-tender. Bowel sounds normal. Wounds c/d/i. EXTREMITIES:  extremities normal, atraumatic, no cyanosis or edema Labs:  
Recent Results (from the past 24 hour(s)) MAGNESIUM Collection Time: 19  5:24 AM  
Result Value Ref Range Magnesium 1.8 1.6 - 2.4 mg/dL Data Review images and reports reviewed Assessment:  
 
Active Problems: 
  SBO (small bowel obstruction) ([address] (2019) Plan/Recommendations/Medical Decision Making:  
 
Continue present treatment GI lite diet Presumed aspiration pneumonia, appreciate pulmonary assistance. Work with PT. Discharge planning. OT recommending rehab for multiple concerns. Add Probiotics today. Ready for d/c when rehab placement available. Kaley Hahn. Víctor Mckinley MD, [facility] [address] [facility]

## 2019-05-09 NOTE — PROGRESS NOTES
Occupational Therapy 3171:  Patient just received breakfast and wanted to eat. 4495:  Patient is still working on her breakfast. 
 
1043:  Patient recently got back to bed and is sleeping soundly.

## 2019-05-09 NOTE — PROGRESS NOTES
Nutrition Assessment: 
 
RECOMMENDATIONS:  
Continue diet per surgeon Continue PO supplements ASSESSMENT:  
Chart reviewed. Pt is less confused and DC planning is underway. Noted plan for pt to be discharged tomorrow. Her appetite has been improving every day and now she is consuming the majority of her meals. Labs reviewed, WNL from yesterday. BM noted today. Dietitians Intervention(s)/Plan(s): Continue diet and supplements SUBJECTIVE/OBJECTIVE:  
 
Diet Order: Other (comment)(GI Lite + Ensure Clear BID ) 
% Eaten:   
Patient Vitals for the past 72 hrs: 
 % Diet Eaten 05/09/19 1431 100 % 05/09/19 0955 75 % 05/08/19 1727 80 % 05/08/19 1311 40 % 05/06/19 1755 50 % Pertinent Medications:augmentin, victorino Q, neutraphos; [email or web address]). Chemistries: 
Lab Results Component Value Date/Time Sodium 141 05/08/2019 05:29 AM  
 Potassium 3.9 05/08/2019 05:29 AM  
 Chloride 110 (H) 05/08/2019 05:29 AM  
 CO2 23 05/08/2019 05:29 AM  
 Anion gap 8 05/08/2019 05:29 AM  
 Glucose 94 05/08/2019 05:29 AM  
 BUN 7 05/08/2019 05:29 AM  
 Creatinine 0.69 05/08/2019 05:29 AM  
 BUN/Creatinine ratio 10 (L) 05/08/2019 05:29 AM  
 GFR est AA >60 05/08/2019 05:29 AM  
 GFR est non-AA >60 05/08/2019 05:29 AM  
 Calcium 7.8 (L) 05/08/2019 05:29 AM  
 Albumin 3.0 (L) 05/03/2019 11:30 PM  
  
Anthropometrics: Height: 5' 7\" (170.2 cm) Weight: 71.4 kg (157 lb 6.5 oz)   []bed scale    []stated   []unknown IBW (%IBW):   ( ) UBW (%UBW):   (  %) BMI: Body mass index is 24.65 kg/m². This BMI is indicative of: 
[]Underweight   [x]Normal   []Overweight   [] Obesity   [] Extreme Obesity (BMI>40) Estimated Nutrition Needs (Based on): 7773 Kcals/day(BMR (1286) x 1. 3AF) , 77 g(1.0 g/kg bw) Protein Carbohydrate: At Least 130 g/day  Fluids: 1700 mL/day Last BM: 5/9   [x]Active     []Hyperactive  []Hypoactive       [] Absent   BS Skin:    [] Intact   [x] Incision  [] Breakdown   [] DTI   [] Tears/Excoriation/Abrasion  [x]Edema(trace-BLE)  [] Other: Wt Readings from Last 30 Encounters:  
05/09/19 71.4 kg (157 lb 6.5 oz)  
07/26/18 81.6 kg (180 lb)  
06/26/18 82.1 kg (181 lb) 02/17/17 82.2 kg (181 lb 4 oz) 06/09/16 80.7 kg (178 lb) 04/29/16 81.2 kg (179 lb) 08/01/14 77.1 kg (170 lb) 05/05/13 80.3 kg (177 lb)  
11/12/12 80.3 kg (177 lb 1 oz) 11/05/12 81.6 kg (180 lb)  
02/21/11 76.2 kg (168 lb) NUTRITION DIAGNOSES:  
Problem:  Inadequate protein-energy intake Etiology: related to SBO, acute met encephalopathy Signs/Symptoms: as evidenced by CL/NPO x 5 days Previous dx re: inadequate protein energy intake resolved. NUTRITION INTERVENTIONS: 
Meals/Snacks: General/healthful diet   Supplements: Commercial supplement GOAL:  
Pt will consume >70% of meals/supplements in 4-6 days. NUTRITION MONITORING AND EVALUATION Previous Goal: progress diet as medically feasible within 2-4 days Previous Goal Met: Yes Previous Recommendations Implemented: Yes Cultural, Faith, or Ethnic Dietary Needs: None LEARNING NEEDS (Diet, Food/Nutrient-Drug Interaction):  
 [x] None Identified 
 [] Identified and Education Provided/Documented 
 [] Identified and Pt declined/was not appropriate [x] Interdisciplinary Care Plan Reviewed/Documented  
 [x] Participated in Discharge Planning: per surgeon 
 [] Interdisciplinary Rounds NUTRITION RISK:  
 [] High              [] Moderate           [x]  Low  []  Minimal/Uncompromised Fani Duran RD, [facility] Pager [phone] Weekend Pager [phone]

## 2019-05-09 NOTE — PROGRESS NOTES
Plan: 
-[facility] [place], [facility]. for rehab 
-Family to transport UPDATE 3:18PM 
Therapy/MD concerns about safety of pt returning straight to home. CM discussed with pt and dtr, Mayi Saavedra, at bedside. Dtr reported that they have been discussing and think pt would benefit from short-term stay. Preference still [facility]. CM PC to [place] at [facility] for [place] to come reassess pt. CM notified [place] that tele-sitter removed from room this morning and [place] d/radha today. Plan for [place], if [facility] able to accept. Family plans to transport. CM will continue to follow and assist with d/c planning. Angelia Salmon MSW Care Manager

## 2019-05-09 NOTE — PROGRESS NOTES
Problem: Mobility Impaired (Adult and Pediatric) Goal: *Acute Goals and Plan of Care (Insert Text) Description Physical Therapy Goals Initiated 5/1/2019, reviewed and updated 5/8/2019 1. Patient will move from supine to sit and sit to supine , scoot up and down and roll side to side in bed with supervision/set-up within 7 day(s). 2.  Patient will transfer from bed to chair and chair to bed with minimal assistance/contact guard assist using the least restrictive device within 7 day(s). 3.  Patient will perform sit to stand with contact guard assist within 7 day(s). 4.  Patient will ambulate with minimal assistance/contact guard assist for 200 feet with the least restrictive device within 7 day(s). 1.  Patient will move from supine to sit and sit to supine , scoot up and down and roll side to side in bed with supervision/set-up within 7 day(s). 2.  Patient will transfer from bed to chair and chair to bed with minimal assistance/contact guard assist using the least restrictive device within 7 day(s). 3.  Patient will perform sit to stand with minimal assistance/contact guard assist within 7 day(s). 4.  Patient will ambulate with minimal assistance/contact guard assist for 50 feet with the least restrictive device within 7 day(s). Note: PHYSICAL THERAPY TREATMENT Patient: Regino Andujar (87 y.o. female) Date: 5/9/2019 Diagnosis: [facility] (small bowel obstruction) ([facility] [K56.609] <principal problem not specified> Procedure(s) (LRB): 
LAPAROSCOPY GENERAL DIAGNOSTIC LYSIS OF ADHESIONS (N/A) 9 Days Post-Op Precautions: Fall(confusion) Chart, physical therapy assessment, plan of care and goals were reviewed. ASSESSMENT: 
Patient with good improvement in mobility and balance today. Patient slightly confused at times but improved over last session. Supine in bed upon arrival, agreeable to PT. Bed mobility with supervision.   Good sitting balance EOB. Assisted patient into bathroom with rolling walker and CGA. After leaving restroom patient ambulated additional 250', 100' with rolling walker and 150' with no assistive device. Patient's gait improved without the walker. Patient with slightly decreased step length and pace but no LOB except with high level balance activities. Up in chair at end of session. Recommend home with HHPT and 24/7 for safety at discharge. Progression toward goals: 
?    Improving appropriately and progressing toward goals ? Improving slowly and progressing toward goals ? Not making progress toward goals and plan of care will be adjusted PLAN: 
Patient continues to benefit from skilled intervention to address the above impairments. Continue treatment per established plan of care. Discharge Recommendations:  Home Health Further Equipment Recommendations for Discharge:  none SUBJECTIVE:  
Patient stated ? Yes, I can walk.? OBJECTIVE DATA SUMMARY:  
Critical Behavior: 
Neurologic State: Alert Orientation Level: Oriented to person, Oriented to place, Oriented to time Cognition: Follows commands Safety/Judgement: Decreased awareness of environment, Decreased awareness of need for assistance, Decreased awareness of need for safety, Fall prevention Functional Mobility Training: 
Bed Mobility: 
Rolling: Supervision Supine to Sit: Supervision Transfers: 
Sit to Stand: Stand-by assistance Stand to Sit: Stand-by assistance Bed to Chair: Contact guard assistance Balance: 
Sitting: Intact Sitting - Static: Good (unsupported) Sitting - Dynamic: Good (unsupported) Standing: Impaired Standing - Static: Good;Constant support Standing - Dynamic : Good(initially fair) Ambulation/Gait Training: 
Distance (ft): 250 Feet (ft) Assistive Device: Walker, rolling;Gait belt Ambulation - Level of Assistance: Contact guard assistance Gait Abnormalities: Decreased step clearance Speed/Xochilt: Pace decreased (<100 feet/min) Step Length: Left shortened;Right shortened Neuro Re-Education: 
High level standing balance avtivites with CGA Pain: 
  
  
  
  
  
  
Activity Tolerance:  
good Please refer to the flowsheet for vital signs taken during this treatment. After treatment:  
?    Patient left in no apparent distress sitting up in chair ? Patient left in no apparent distress in bed 
? Call bell left within reach ? Nursing notified ? Caregiver present ? Bed alarm activated COMMUNICATION/COLLABORATION:  
The patient?s plan of care was discussed with: Registered Nurse Andrew Navarrete, PT Time Calculation: 14 mins

## 2019-05-10 VITALS
WEIGHT: 161 LBS | SYSTOLIC BLOOD PRESSURE: 127 MMHG | RESPIRATION RATE: 16 BRPM | HEIGHT: 67 IN | BODY MASS INDEX: 25.27 KG/M2 | DIASTOLIC BLOOD PRESSURE: 51 MMHG | TEMPERATURE: 98.5 F | HEART RATE: 75 BPM | OXYGEN SATURATION: 95 %

## 2019-05-10 PROCEDURE — 74011250637 HC RX REV CODE- 250/637: Performed by: GENERAL ACUTE CARE HOSPITAL

## 2019-05-10 PROCEDURE — 74011250636 HC RX REV CODE- 250/636: Performed by: SURGERY

## 2019-05-10 PROCEDURE — 74011250637 HC RX REV CODE- 250/637: Performed by: SURGERY

## 2019-05-10 PROCEDURE — 94760 N-INVAS EAR/PLS OXIMETRY 1: CPT

## 2019-05-10 RX ADMIN — AMOXICILLIN AND CLAVULANATE POTASSIUM 1 TABLET: 875; 125 TABLET, FILM COATED ORAL at 08:40

## 2019-05-10 RX ADMIN — Medication 1 CAPSULE: at 08:40

## 2019-05-10 RX ADMIN — Medication 5 ML: at 05:06

## 2019-05-10 RX ADMIN — ENOXAPARIN SODIUM 40 MG: 40 INJECTION SUBCUTANEOUS at 08:40

## 2019-05-10 RX ADMIN — FLUOXETINE HYDROCHLORIDE 20 MG: 20 SOLUTION ORAL at 08:40

## 2019-05-10 RX ADMIN — SODIUM CHLORIDE AND POTASSIUM CHLORIDE: 9; 1.49 INJECTION, SOLUTION INTRAVENOUS at 05:06

## 2019-05-10 NOTE — PROGRESS NOTES
Medicare pt has received, reviewed, and signed 2nd IM letter informing them of their right to appeal the discharge. Signed copy has been placed on pt bedside chart. Dwayne Burgos [phone]

## 2019-05-10 NOTE — PROGRESS NOTES
Plan: 
-D/c to [facility] for rehab 
-Family to transport by car UPDATE 9:00AM 
PC to Rachael with [facility]. She has seen pt this morning and they are prepared to accept today after they speak with pt's dtr regarding disposition. Family to transport. CM to notify attending and pt's family after final okay from [phone] 10:39AM 
CM left VM for Rachael and sent message in CC to check on status of admission. UPDATE 10:54AM 
PC from pt's dtr, Siomara Rendon, stating she spoke with Elizabeth. Plan for family to transport. Will arrive at hospital within the hour to  pt. Nursing aware. CM spoke with Rachael who confirmed they are expecting pt. CM reviewed any pt needs (NO O2, wounds, IV abx, or contact). Nursing call report to [phone]. Pt ready for d/c from CM perspective. Medicare pt has received, reviewed, and signed 2nd IM letter informing them of their right to appeal the discharge. Signed copy has been placed on pt bedside chart. CM available for any additional needs. Care Management Interventions PCP Verified by CM: Yes Mode of Transport at Discharge: Other (see comment)(Pt's family ) Transition of Care Consult (CM Consult): SNF Partner SNF: Yes Discharge Durable Medical Equipment: No 
Physical Therapy Consult: Yes Occupational Therapy Consult: Yes Speech Therapy Consult: No 
Current Support Network: Relative's Home Confirm Follow Up Transport: Family Plan discussed with Pt/Family/Caregiver: Yes Freedom of Choice Offered: Yes Discharge Location Discharge Placement: Skilled nursing facility([facility]) DANIEL Roche Care Manager

## 2019-05-10 NOTE — DISCHARGE SUMMARY
Physician Discharge Summary     Patient ID:  Arpit Hoskins  [id number]  89 y.o.  1940    Admit Date: 4/28/2019    Discharge Date: 5/10/2019    Admission Diagnoses: SBO (small bowel obstruction) ([facility] [K56.609]    Discharge Diagnoses: Active Problems:    SBO (small bowel obstruction) ([facility] (4/28/2019)         Admission Condition: Poor    Discharge Condition: Good    Last Procedure: Procedure(s):  [address] Course:   Pt admitted with high grade small bowel obstruction. Taken to surgery and found to have closed loop obstruction secondary to adhesive band entrapping small bowel mesenteric root. She did not require bowel resection. Postoperatively she had delayed return of bowel function and mental status changes. This has improved and she is now suitable for d/c to rehab facility. Consults: Hospitalist and Pulmonary/Critical Care    [facility]    Patient Instructions:   Current Discharge Medication List      CONTINUE these medications which have NOT CHANGED    Details   clemastine 2.68 mg tab tablet Take 1.34 mg by mouth daily as needed (Allergies). raNITIdine (ZANTAC) 150 mg tablet Take 150 mg by mouth daily as needed for Indigestion (acid reflux). polysorbate 80/glycerin (REFRESH DRY EYE THERAPY OP) Apply 2 Drops to eye daily. vitamin E (AQUA GEMS) 400 unit capsule Take 400 Units by mouth daily. ALPRAZolam (XANAX) 0.5 mg tablet Take 0.5 mg by mouth two (2) times a day. meclizine (ANTIVERT) 25 mg tablet Take 25 mg by mouth three (3) times daily as needed for Dizziness (vertigo). Indications: sensation of spinning or whirling      vit C/E/Zn/coppr/lutein/zeaxan (PRESERVISION AREDS 2 PO) Take 40 mg by mouth daily. Indications: eye lubrication      fluoxetine (PROZAC) 40 mg capsule Take 40 mg by mouth every morning. aspirin 81 mg tablet Take 81 mg by mouth every morning.       CALCIUM CARBONATE/VITAMIN D3 (CALTRATE-600 PLUS VITAMIN D3 PO) Take 1 Tab by mouth every morning. STOP taking these medications       vit A/vit C/vit E/zinc/copper (ICAPS AREDS PO) Comments:   Reason for Stopping:             Activity: No heavy lifting for 4 weeks  Diet: Regular Diet  Wound Care: Keep wound clean and dry    Follow-up with Dr. Adams Petersen on 5/17/19.   Follow-up tests/labs none    Signed:  Norm Glover MD  [facility]  5/10/2019  10:45 AM

## 2019-05-10 NOTE — PROGRESS NOTES
CM verified that initial IP order written 4/28/2019 at 1531. Patient eligible for transfer to SNF on 5/1/2019. Jamar Pisano RN, BSN, ACM [address] [facility]   [phone]

## 2019-05-10 NOTE — PROGRESS NOTES
Transition Note to SNF/Rehab 
 
SNF/Rehab Transition: 
Patient has been accepted to [address] and meets criteria for admission. Patient will transported by car and expected to leave at morning. Communication to SNF/Rehab: 
Bedside RN, Yao Palacios, has been notified to update the transition plan to the facility and call report (phone number [phone]). Discharge information has been updated on the AVS. Discharge instructions to be fax'd to facility at [facility] ). Communication to Patient/Family: Met with patient and , patients  daughter and they are agreeable to the transition plan. SNF/Rehab Transition: 
Patient to follow-up with Home Health: N/A 
PCP/Specialist: Dr. Lalo Hull Ambulatory Care Management: N/A Reviewed and confirmed with facility, [facility], can manage the patient care needs for the following:  
 
Hoda Lunch with (X) only those applicable: 
 
Medication: 
[x]  Medications will be available at the facility []  IV Antibiotics []  Controlled Substance - hard copy to be sent with patient  
[]  Weekly Labs Equipment: 
[]  CPAP/BiPAP []  Wound Vacuum 
[]  Feliz or Urinary Device 
[]  PICC/Central Line 
[]  Nebulizer 
[]  Ventilator Treatment: 
[]Isolation (for MRSA, VRE, etc.) []Surgical Drain Management []Tracheostomy Care 
[]Dressing Changes []Dialysis with transportation []PEG Care []Oxygen []Daily Weights for Heart Failure Dietary: 
[]Any diet limitations []Tube Feedings []Total Parenteral Management (TPN) Financial Resources: 
[]Medicaid Application - N/A 
[]UAI Completed- N/A 
[]Level II Completed- N/A Advanced Care Plan: 
[x]Surrogate Decision Maker of Care 
[]POA []Communicated Code Status Full Other: Dtr/son NOK. Family  to transport.

## 2019-05-10 NOTE — DISCHARGE INSTRUCTIONS
Patient Education        Bowel Blockage (Intestinal Obstruction): Care Instructions  Your Care Instructions  A bowel blockage, also called an intestinal obstruction, can prevent gas, fluids, or solids from moving through the intestines normally. It can cause constipation and, rarely, diarrhea. You may have pain, nausea, vomiting, and cramping. Most of the time, complete blockages require a stay in the hospital and possibly surgery. But if your bowel is only partly blocked, your doctor may tell you to wait until it clears on its own and you are able to pass gas and stool. If so, there are things you can do at home to help make you feel better. If you have had surgery for a bowel blockage, there are things you can do at home to make sure you heal well. You can also make some changes to keep your bowel from becoming blocked again. Follow-up care is a key part of your treatment and safety. Be sure to make and go to all appointments, and call your doctor if you are having problems. It's also a good idea to know your test results and keep a list of the medicines you take. How can you care for yourself at home? If your doctor has told you to wait at home for a blockage to clear on its own:  · Follow your doctor's instructions. These may include eating a liquid diet to avoid complete blockage. · Be safe with medicines. Take your medicines exactly as prescribed. Call your doctor if you think you are having a problem with your medicine. · Put a heating pad set on low on your belly to relieve mild cramps and pain. To prevent another blockage  · Try to eat smaller amounts of food more often. For example, have 5 or 6 small meals throughout the day instead of 2 or 3 large meals. · Chew your food very well. Try to chew each bite about 20 times or until it is liquid. · Avoid high-fiber foods and raw fruits and vegetables with skins, husks, strings, or seeds.  These can form a ball of undigested material that can cause a blockage if a part of your bowel is scarred or narrowed. · Check with your doctor before you eat whole-grain products or use a fiber supplement such as Citrucel or Metamucil. · To help you have regular bowel movements, eat at regular times, do not strain during a bowel movement, and drink at least 8 to 10 glasses of water each day. If you have kidney, heart, or liver disease and have to limit fluids, talk with your doctor or before you increase the amount of fluids you drink. · Drink high-calorie liquid formulas if your doctor says to. Severe symptoms may make it hard for your body to take in vitamins and minerals. · Get regular exercise. It helps you digest your food better. Get at least 30 minutes of physical activity on most days of the week. Walking is a good choice. When should you call for help? Call your doctor now or seek immediate medical care if:    · You have a fever.     · You are vomiting.     · You have new or worse belly pain.     · You cannot pass stools or gas.    Watch closely for changes in your health, and be sure to contact your doctor if you have any problems. Where can you learn more? Go to http://alesia-saud.info/. Enter A707 in the search box to learn more about \"Bowel Blockage (Intestinal Obstruction): Care Instructions. \"  Current as of: March 27, 2018  Content Version: 11.9  © 8199-5346 Healthwise, Incorporated. Care instructions adapted under license by [employer] (which disclaims liability or warranty for this information). If you have questions about a medical condition or this instruction, always ask your healthcare professional. [place] [address] any warranty or liability for your use of this information.

## 2019-05-10 NOTE — ROUTINE PROCESS
[facility] to [address] [place] [address] [place] 
                                                                      66 y.o.   female 
- 
[facility]   Room: 2103/01    [facility] 2 GENERAL SURGERY  Unit Phone# :     [phone] [address] 
[facility] [address] [address] Dept: [phone] Loc: [id number] SITUATION Admitted:  4/28/2019         Attending Provider:  Jami Stark MD    
 
Consultations:  IP CONSULT TO HOSPITALIST 
 
PCP:  Gilbert Ovalle MD   [phone] Treatment Team: Attending Provider: Jami Stark MD; Consulting Provider: Jami Stark MD; Consulting Provider: Scott Wallace MD; Care Manager: Joselito Meyer Admitting Dx:  SBO (small bowel obstruction) ([facility] [R65.920] Principal Problem: <principal problem not specified> 
 
10 Days Post-Op of  
Procedure(s): LAPAROSCOPY GENERAL DIAGNOSTIC LYSIS OF ADHESIONS  
BY: Jami Stark MD             ON: 4/30/2019 Code Status: Full Code Advance Directives:  
Advance Care Planning 4/28/2019 Primary Decision Maker Relationship to Patient -  
Confirm Advance Directive None Patient Would Like to Complete Advance Directive No  
 (Send w/pa-tient) No Doesnt Have Isolation:  There are currently no Active Isolations       MDRO: No current active infections Pain Medications given:      Last dose:  
 
Special Equipment needed: no  Type of equipment: 
 
(Not currently on dialysis) (Not currently on dialysis) BACKGROUND Allergies: Allergies Allergen Reactions  Demerol [Meperidine] Nausea and Vomiting  Codeine Not Reported This Time  Penicillins Rash and Itching Has tolerated zosyn Past Medical History:  
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  
 2 \"mini-byers\" -as of 2/1/5/17 pt denies any residual effect  Unspecified adverse effect of anesthesia   
 sister-almost dies after surgeries. pt has never head of malignant hyperthermia and pt has never had difficulty during anesthesia Past Surgical History:  
Procedure Laterality Date  COLONOSCOPY N/A 6/9/2016 COLONOSCOPY performed by Anali Cee MD at [facility] ENDOSCOPY  
 HX BREAST BIOPSY Bilateral   
 benign  HX HYSTERECTOMY  HX LAP CHOLECYSTECTOMY  HX SALPINGO-OOPHORECTOMY \"left one ovary\"  HX SHOULDER ARTHROSCOPY Left  HX TONSILLECTOMY  HX TUBAL LIGATION    
 OK COLONOSCOPY FLX DX W/COLLJ SPEC WHEN PFRMD  2/21/2011 Medications Prior to Admission Medication Sig  clemastine 2.68 mg tab tablet Take 1.34 mg by mouth daily as needed (Allergies).  raNITIdine (ZANTAC) 150 mg tablet Take 150 mg by mouth daily as needed for Indigestion (acid reflux).  polysorbate 80/glycerin (REFRESH DRY EYE THERAPY OP) Apply 2 Drops to eye daily.  vitamin E (AQUA GEMS) 400 unit capsule Take 400 Units by mouth daily.  ALPRAZolam (XANAX) 0.5 mg tablet Take 0.5 mg by mouth two (2) times a day.  meclizine (ANTIVERT) 25 mg tablet Take 25 mg by mouth three (3) times daily as needed for Dizziness (vertigo). Indications: sensation of spinning or whirling  vit C/E/Zn/coppr/lutein/zeaxan (PRESERVISION AREDS 2 PO) Take 40 mg by mouth daily. Indications: eye lubrication  fluoxetine (PROZAC) 40 mg capsule Take 40 mg by mouth every morning.  aspirin 81 mg tablet Take 81 mg by mouth every morning.  CALCIUM CARBONATE/VITAMIN D3 (CALTRATE-600 PLUS VITAMIN D3 PO) Take 1 Tab by mouth every morning. Hard scripts included in transfer packet no Vaccinations:   
Immunization History Administered Date(s) Administered  Pneumococcal Polysaccharide (PPSV-23) 05/06/2013 Readmission Risks:    Known Risks: The Charlson CoMorbitiy Index tool is an evidenced based tool that has more automatic generated information. The tool looks at many different items such as the age of the patient, how many times they were admitted in the last calendar year, current length of stay in the hospital and their diagnosis. All of these items are pulled automatically from information documented in the chart from various places and will generate a score that predicts whether a patient is at low (less than 13), medium (13-20) or high (21 or greater) risk of being readmitted. ASSESSMENT Temp: 98.5 °F (36.9 °C) (05/10/19 0749) Pulse (Heart Rate): 93 (05/10/19 0749) Resp Rate: 16 (05/10/19 0749)           BP: 140/68 (05/10/19 0749) O2 Sat (%): 98 % (05/10/19 0749) Weight: 73 kg (161 lb)    Height: 5' 7\" (170.2 cm) (04/30/19 1546) If above not within 1 hour of discharge: 
 
BP:_____  P:____  R:____ T:_____ O2 Sat: ___%  O2: ______ Active Orders Diet DIET GI LITE (POST SURGICAL) Orientation: oriented to time, place, person and situation Active Behaviors: None Active Lines/Drains:  (Peg Tube / Feliz / CL or S/L?): no 
 
Urinary Status: Voiding, Bathroom privileges     Last BM: Last Bowel Movement Date: 05/08/19 Skin Integrity: Incision (comment)(abd lap sites) Mobility: No limitations Weight Bearing Status: WBAT (Weight Bearing as Tolerated) Gait Training Assistive Device: Walker, rolling, Gait belt Ambulation - Level of Assistance: Contact guard assistance Distance (ft): 250 Feet (ft) Lab Results Component Value Date/Time  Glucose 94 05/08/2019 05:29 AM  
 HGB 11.8 05/06/2019 03:45 AM  
 HGB 11.9 05/04/2019 06:35 AM  
  
  RECOMMENDATION See After Visit Summary (AVS) for: · Discharge instructions · After [facility] · Special equipment needed (entered pre-discharge by Care Management) · Medication Reconciliation · Follow up Appointment(s) Report given/sent by:  Akshat Waddell RN Verbal report given to: Amanda Moreau at [facility]  FAXED to:     
    
Estimated discharge time:  5/10/2019 at 1137

## 2019-05-10 NOTE — PROGRESS NOTES
Bedside shift change report recieved Johanne Dowell RN. Report included the following information SBAR, Kardex, Intake/Output, MAR, Accordion and Recent Results. Pt awake in bed, denies any pain at this time.  
 
Maged Ambriz rn

## 2019-05-14 ENCOUNTER — PATIENT OUTREACH (OUTPATIENT)
Dept: CASE MANAGEMENT | Age: 79
End: 2019-05-14

## 2019-05-14 NOTE — PROGRESS NOTES
Community Care Team documentation for patient in [facility]  Initial Follow Up       Patient was discharged to [facility]. Information included in this progress note has been provided to SNF. Hospital Admission and Diagnosis:  [facility] 4/28 - 5/10  SBO (small bowel obstruction)    RRAT Score:  15       Advance Care Planning:  Not on file    PCP : Jolanta Snell MD    SNF Attending: Robert Dowell MD    Spoke with SNF team. Ensured patient arrived to SNF safely with admission packet in order. PT/OT did not  following initial eval, pt was high level. Pt discharged 5/13 home with daughter. Home health offered, pt declined. CCT to sign off at this time. Community Care Team will follow up weekly with [facility] until discharge. Medications were not reconciled and general patient assessment was not completed during this [facility] outreach.

## 2019-10-09 NOTE — PROGRESS NOTES
Hospitalist Progress Note NAME: Jessica Castro :  1940 MRN:  [id number] Assessment / Plan: 
Acute metabolic encephalopathy - improving ? Aspiration pneumonia 
 etiology unclear likely multifactorial, polypharmacy vs dehydration Protonix, zosyn DC  2days ago Ct scan brain unremarkable , labs wnl . No bacterial  
Pt started on seroquel , her xanax was restarted then stopped , now is on xanax prn 
EEG : no seizures : 
Pt remains on Cefepime and Vanco. CXR does not show any evidence of PNA. WBC is wnl. Pt remains afebrile > 48 hours. MRSA Cx negative. BCx NGTD 3 days. Will discontinue Vancomycin. Will likely transition to oral abx for empiric therapy and course completion upon discharge. : 
Pt's mental status continues to improve - likely at baseline. WBC wnl, remains afebrile. BCx NGTD 4 days. Vancomycin discontinued yesterday. Still remains on Cefepime - will change this to Augmentin - will place end date so give rx on discharge []. Do not have anything further to add at this point - will sign off. Thank you for the consult. Hypophosphatemia - repleted Hypokalemia - resolved Hyponatremia - resolved JOSH most likely prerenal - resolved Anxiety: c/w  Xanax and  Prozac  
  
SBO s/p explorative laprascopy  With lysis of adhesions, Persistent SBO Care per surgery Repeat CT scan : 
Mild to moderate distention of proximal mid small bowel loops with transition 
to normal caliber distal small bowel. Diagnostic considerations include ileus 
and small bowel obstruction. Clinical correlation recommended. 2. Trace free peritoneal fluid. 3. Small bilateral pleural effusions. Body mass index is 25.91 kg/m². therefore classifying patient as overweight, NOS (body mass index [BMI] of 25 to 29.9 kg/m2) Code status: Full Prophylaxis: Lovenox Recommended Disposition: per surgery Subjective: Chief Complaint / Reason for Physician Visit MS improved. Answers questions appropriately. Denies any complaints. Review of Systems: 
Symptom Y/N Comments  Symptom Y/N Comments Fever/Chills    Chest Pain n   
Poor Appetite    Edema Cough    Abdominal Pain n   
Sputum    Joint Pain SOB/ALARCON n   Pruritis/Rash Nausea/vomit n   Tolerating PT/OT Diarrhea    Tolerating Diet y GI lite Constipation    Other Could NOT obtain due to:   
 
Objective: VITALS:  
Last 24hrs VS reviewed since prior progress note. Most recent are: 
Patient Vitals for the past 24 hrs: 
 Temp Pulse Resp BP SpO2  
05/08/19 0740 98.3 °F (36.8 °C) 83 16 149/61 95 % 05/08/19 0322 97.9 °F (36.6 °C) 81 16 144/67 93 % 05/07/19 2358 98.1 °F (36.7 °C) 85 16 147/60 92 % 05/07/19 1917 99.1 °F (37.3 °C) 82 17 137/57 94 % 05/07/19 1501 98.8 °F (37.1 °C) 78 18 120/45 94 % Intake/Output Summary (Last 24 hours) at 5/8/2019 9754 Last data filed at 5/8/2019 5375 Gross per 24 hour Intake 1187.5 ml Output 650 ml Net 537.5 ml PHYSICAL EXAM: 
General: WD, WN. Alert, cooperative, no acute distress   
EENT:  EOMI. Anicteric sclerae. MMM Resp:  CTA bilaterally, no wheezing or rales. No accessory muscle use CV:  Regular  rhythm,  No edema GI:  Soft, + distended, Non tender.  no BS heard Neurologic:  Alert and oriented X 3, normal speech, Psych:   Fair insight Skin:  No rashes. No jaundice Reviewed most current lab test results and cultures  YES Reviewed most current radiology test results   YES Review and summation of old records today    NO Reviewed patient's current orders and MAR    YES 
PMH/SH reviewed - no change compared to H&P 
________________________________________________________________________ Care Plan discussed with: 
  Comments Patient x Family RN x Care Manager Consultant                   [facility] rounds were held today with case manager, nursing, pharmacist and clinical coordinator. Patient's plan of care was discussed; medications were reviewed and discharge planning was addressed. ________________________________________________________________________ Total NON critical care TIME:  25   Minutes Total CRITICAL CARE TIME Spent:   Minutes non procedure based Comments >50% of visit spent in counseling and coordination of care    
________________________________________________________________________ Pedrito Victoria MD  
 
Procedures: see electronic medical records for all procedures/Xrays and details which were not copied into this note but were reviewed prior to creation of Plan. LABS: 
I reviewed today's most current labs and imaging studies. Pertinent labs include: 
Recent Labs 05/06/19 
0345 WBC 9.3 HGB 11.8 HCT 33.5*  
 Recent Labs 05/08/19 
8723 05/07/19 2016 05/06/19 
0345   --  137  
K 3.9  --  2.8*  
*  --  103 CO2 23  --  24  
GLU 94  --  100 BUN 7  --  10  
CREA 0.69  --  0.66  
CA 7.8*  --  7.9*  
MG 1.9 2.1 2.1 PHOS 2.4*  --   --   
 
 
Signed: Pedrito Victoria MD 
 Is This A New Presentation, Or A Follow-Up?: Skin Lesion What Type Of Note Output Would You Prefer (Optional)?: Bullet Format How Severe Is Your Skin Lesion?: moderate Has Your Skin Lesion Been Treated?: not been treated

## 2019-11-11 NOTE — PROGRESS NOTES
Care Management: 
 
MAR: Home with family and [facility]. Waiting to hear if [facility] can accept. Post bowel surgery and in ICU secondary to ? Aspiration. She is doing well and has transfer orders out to floor. Jamilah been consulted via [facility] and I have sent message today to follow up to see if they can accept. DTR Kalani Hinojosa is aaronk. Cherrie [facility] ac [phone] Christine Arevalo is seen in follow-up.  I had consulted on her in August with a missed AB.  Expectant management was undertaken and she believes she passed tissue about a week later at the end of August.  She then started back on Ortho-Novum 1/35's.  Is currently in her third cycle of this.  Her to menses however were quite heavy and now midcycle she had significant bleeding and cramping in this week and passed tissue which is strongly suspicious for placental tissue.  She saved the tissue in the refrigerator and brought it in today.  Review of it by myself is suspicious for placenta and sac.  Patient thinks that she did get pregnant previously on OCPs.  She and her  are still contemplating an additional pregnancy but would like to wait till next year.    We did an endovaginal ultrasound in the office (per Dr. Johnson) which showed no evidence of any retained products of conception and a thin endometrial plate.  We are going to check an hCG and send the specimen for pathology.  Most likely this is retained products dating back to her miscarriage in August but we cannot completely rule out her getting pregnant on OCPs and this being a new miscarriage.    Addendum:  Hcg returned < 1    Pt to be called with pathology report.    20 minutes spent, > 50% counseling

## 2021-06-16 ENCOUNTER — OFFICE VISIT (OUTPATIENT)
Dept: NEUROLOGY | Age: 81
End: 2021-06-16
Payer: MEDICARE

## 2021-06-16 VITALS
SYSTOLIC BLOOD PRESSURE: 118 MMHG | DIASTOLIC BLOOD PRESSURE: 60 MMHG | HEART RATE: 87 BPM | OXYGEN SATURATION: 96 % | WEIGHT: 168 LBS | RESPIRATION RATE: 16 BRPM | TEMPERATURE: 98.1 F | BODY MASS INDEX: 26.31 KG/M2

## 2021-06-16 DIAGNOSIS — G45.9 TIA (TRANSIENT ISCHEMIC ATTACK): ICD-10-CM

## 2021-06-16 DIAGNOSIS — M35.9 AUTOIMMUNE DISEASE (HCC): ICD-10-CM

## 2021-06-16 DIAGNOSIS — R29.818 EXTRAPYRAMIDAL SYMPTOM: ICD-10-CM

## 2021-06-16 DIAGNOSIS — H02.403 PTOSIS OF BOTH EYELIDS: ICD-10-CM

## 2021-06-16 DIAGNOSIS — R29.6 FALLS FREQUENTLY: ICD-10-CM

## 2021-06-16 DIAGNOSIS — R26.89 BALANCE DISORDER: Primary | ICD-10-CM

## 2021-06-16 DIAGNOSIS — E53.8 VITAMIN B12 DEFICIENCY: ICD-10-CM

## 2021-06-16 PROBLEM — R32 URINARY INCONTINENCE: Status: ACTIVE | Noted: 2019-07-26

## 2021-06-16 PROBLEM — K56.609 SBO (SMALL BOWEL OBSTRUCTION) (HCC): Status: RESOLVED | Noted: 2019-04-28 | Resolved: 2021-06-16

## 2021-06-16 PROCEDURE — G8400 PT W/DXA NO RESULTS DOC: HCPCS | Performed by: PSYCHIATRY & NEUROLOGY

## 2021-06-16 PROCEDURE — 1101F PT FALLS ASSESS-DOCD LE1/YR: CPT | Performed by: PSYCHIATRY & NEUROLOGY

## 2021-06-16 PROCEDURE — G8419 CALC BMI OUT NRM PARAM NOF/U: HCPCS | Performed by: PSYCHIATRY & NEUROLOGY

## 2021-06-16 PROCEDURE — 1090F PRES/ABSN URINE INCON ASSESS: CPT | Performed by: PSYCHIATRY & NEUROLOGY

## 2021-06-16 PROCEDURE — 99205 OFFICE O/P NEW HI 60 MIN: CPT | Performed by: PSYCHIATRY & NEUROLOGY

## 2021-06-16 PROCEDURE — G8536 NO DOC ELDER MAL SCRN: HCPCS | Performed by: PSYCHIATRY & NEUROLOGY

## 2021-06-16 PROCEDURE — G8432 DEP SCR NOT DOC, RNG: HCPCS | Performed by: PSYCHIATRY & NEUROLOGY

## 2021-06-16 PROCEDURE — G8427 DOCREV CUR MEDS BY ELIG CLIN: HCPCS | Performed by: PSYCHIATRY & NEUROLOGY

## 2021-06-16 RX ORDER — ERGOCALCIFEROL 1.25 MG/1
50000 CAPSULE ORAL
COMMUNITY

## 2021-06-16 RX ORDER — UREA 10 %
100 LOTION (ML) TOPICAL DAILY
COMMUNITY

## 2021-06-16 NOTE — PATIENT INSTRUCTIONS
As per discussion    You definitely have balance issues leading to fairly significant falls luckily you have had no serious injuries    To further evaluate this working to do an MRI of your brain we might have to do additional testing after that we will determine that based on what we find    We are also going to do an EEG to make sure this is not some sort of atypical seizure presentation and were going to do a Holter monitor to make sure this is not some unusual irregular heart rate rhythm    Finally working to do some blood work to see if there is any infectious inflammatory metabolic components along with an EMG which looks at how the nerves and the muscles talk to each other    In the meantime I want you to follow safety precautions some of which are listed below    If you need to get a hold of me my chart is the most efficient means of communication    Office Policies    o Phone calls/patient messages:  Please allow up to 24 hours for someone in the office to contact you about your call or message. Be mindful your provider may be out of the office or your message may require further review. We encourage you to use [employer] for your messages as this is a faster, more efficient way to communicate with our office    o Medication Refills:  Prescription medications require up to 48 business hours to process. We encourage you to use [employer] for your refills. For controlled medications: Please allow up to 72 business hours to process. Certain medications may require you to  a written prescription at our office. NO narcotic/controlled medications will be prescribed after 4pm Monday through Friday or on weekends    o Form/Paperwork Completion:  We ask that you allow 7-14 business days. You may also download your forms to [employer] to have your doctor print off.            Preventing Falls: Care Instructions  Your Care Instructions     Getting around your home safely can be a challenge if you have injuries or health problems that make it easy for you to fall. Loose rugs and furniture in walkways are among the dangers for many older people who have problems walking or who have poor eyesight. People who have conditions such as arthritis, osteoporosis, or dementia also have to be careful not to fall. You can make your home safer with a few simple measures. Follow-up care is a key part of your treatment and safety. Be sure to make and go to all appointments, and call your doctor if you are having problems. It's also a good idea to know your test results and keep a list of the medicines you take. How can you care for yourself at home? Taking care of yourself  · You may get dizzy if you do not drink enough water. To prevent dehydration, drink plenty of fluids. Choose water and other caffeine-free clear liquids. If you have kidney, heart, or liver disease and have to limit fluids, talk with your doctor before you increase the amount of fluids you drink. · Exercise regularly to improve your strength, muscle tone, and balance. Walk if you can. Swimming may be a good choice if you cannot walk easily. · Have your vision and hearing checked each year or any time you notice a change. If you have trouble seeing and hearing, you might not be able to avoid objects and could lose your balance. · Know the side effects of the medicines you take. Ask your doctor or pharmacist whether the medicines you take can affect your balance. Sleeping pills or sedatives can affect your balance. · Limit the amount of alcohol you drink. Alcohol can impair your balance and other senses. · Ask your doctor whether calluses or corns on your feet need to be removed. If you wear loose-fitting shoes because of calluses or corns, you can lose your balance and fall. · Talk to your doctor if you have numbness in your feet. Preventing falls at home  · Remove raised doorway thresholds, throw rugs, and clutter.  Repair loose carpet or raised areas in the floor.  · Move furniture and electrical cords to keep them out of walking paths. · Use nonskid floor wax, and wipe up spills right away, especially on ceramic tile floors. · If you use a walker or cane, put rubber tips on it. If you use crutches, clean the bottoms of them regularly with an abrasive pad, such as steel wool. · Keep your house well lit, especially Saritha Caroline, and outside walkways. Use night-lights in areas such as hallways and bathrooms. Add extra light switches or use remote switches (such as switches that go on or off when you clap your hands) to make it easier to turn lights on if you have to get up during the night. · Install sturdy handrails on stairways. · Move items in your cabinets so that the things you use a lot are on the lower shelves (about waist level). · Keep a cordless phone and a flashlight with new batteries by your bed. If possible, put a phone in each of the main rooms of your house, or carry a cell phone in case you fall and cannot reach a phone. Or, you can wear a device around your neck or wrist. You push a button that sends a signal for help. · Wear low-heeled shoes that fit well and give your feet good support. Use footwear with nonskid soles. Check the heels and soles of your shoes for wear. Repair or replace worn heels or soles. · Do not wear socks without shoes on wood floors. · Walk on the grass when the sidewalks are slippery. If you live in an area that gets snow and ice in the winter, sprinkle salt on slippery steps and sidewalks. Preventing falls in the bath  · Install grab bars and nonskid mats inside and outside your shower or tub and near the toilet and sinks. · Use shower chairs and bath benches. · Use a hand-held shower head that will allow you to sit while showering.   · Get into a tub or shower by putting the weaker leg in first. Get out of a tub or shower with your strong side first.  · Repair loose toilet seats and consider installing a raised toilet seat to make getting on and off the toilet easier. · Keep your bathroom door unlocked while you are in the shower. Where can you learn more? Go to http://www.agu[employer].com/  Enter G117 in the search box to learn more about \"Preventing Falls: Care Instructions. \"  Current as of: December 7, 2020               Content Version: 12.8  © 2006-2021 [employer]. Care instructions adapted under license by [employer] (which disclaims liability or warranty for this information). If you have questions about a medical condition or this instruction, always ask your healthcare professional. [address] any warranty or liability for your use of this information.

## 2021-06-16 NOTE — PROGRESS NOTES
[facility] 83  In Office NEW Pt VISIT         Paras Lee is a [de-identified] y.o. female who presents today for the following:  Chief Complaint   Patient presents with    New Patient     3 falls since berto, balance issues         ASSESSMENT AND PLAN  Patient clearly has balance disorder  She has some symptoms consistent with extraparametal symptoms but does not meet criteria at this point time for Parkinson's    We will check blood work neuroimaging to assess for possibility of posterior fossa stroke or other etiology contributing to this possible cerebellar atrophy     Also can look at EMG to make sure there is no neuropathic process contributing to her symptoms she does have reduced vibration on the left side lower extremity    On the outside chance this is an unusual seizure presentation EEG will be ordered    On the outside chance that this is a significant but infrequent cardiac arrhythmia Holter monitor will be ordered    In the meantime safety precautions and fall precautions have been reviewed also print out on AVS    Patient and/or family was given time to ask questions and voice concerns. I believe all questions concerns were adequately addressed at this  office visit.   Patient and/or family also verbalized agreement and understanding of the above-stated plan    Patient remains a complex patient secondary to polypharmacy, significant comorbid conditions, and use of high-risk medications which complicate the decision making process related to patient's neurologic diagnosis        ICD-10-CM ICD-9-CM    1. Balance disorder  R26.89 781.99 MRI BRAIN W WO CONT      RPR      VITAMIN B12      TSH 3RD GENERATION      ACETYLCHOLINE BINDING AB      ACETYLCHOLINE BLOCKING AB      ACETYLCHOLINE REC MOD AB      CBC WITH AUTOMATED DIFF      METABOLIC PANEL, COMPREHENSIVE      ECG HOLTER MONITOR, ANALYSIS 48 HR      REFERRAL TO NEUROLOGY IN CLINIC PROCEDURES      SPEP AND MAURICE, SERUM      EEG 2. Falls frequently  R29.6 V15.88 MRI BRAIN W WO CONT      RPR      VITAMIN B12      TSH 3RD GENERATION      ACETYLCHOLINE BINDING AB      ACETYLCHOLINE BLOCKING AB      ACETYLCHOLINE REC MOD AB      CBC WITH AUTOMATED DIFF      METABOLIC PANEL, COMPREHENSIVE      ECG HOLTER MONITOR, ANALYSIS 48 HR      REFERRAL TO NEUROLOGY IN CLINIC PROCEDURES      SPEP AND MAURICE, SERUM      EEG   3. TIA (transient ischemic attack)  G45.9 435.9 MRI BRAIN W WO CONT   4. Autoimmune disease ([facility] [address]  M35.9 279.49 MRI BRAIN W WO CONT      RPR      VITAMIN B12      TSH 3RD GENERATION      ACETYLCHOLINE BINDING AB      ACETYLCHOLINE BLOCKING AB      ACETYLCHOLINE REC MOD AB      CBC WITH AUTOMATED DIFF      METABOLIC PANEL, COMPREHENSIVE      ECG HOLTER MONITOR, ANALYSIS 48 HR      REFERRAL TO NEUROLOGY IN CLINIC PROCEDURES      SPEP AND MAURICE, SERUM   5. Vitamin B12 deficiency  E53.8 266.2 VITAMIN B12   6. Ptosis of both eyelids  H02.403 374.30 MRI BRAIN W WO CONT      RPR      VITAMIN B12      TSH 3RD GENERATION      ACETYLCHOLINE BINDING AB      ACETYLCHOLINE BLOCKING AB      ACETYLCHOLINE REC MOD AB      CBC WITH AUTOMATED DIFF      METABOLIC PANEL, COMPREHENSIVE      ECG HOLTER MONITOR, ANALYSIS 48 HR      REFERRAL TO NEUROLOGY IN CLINIC PROCEDURES      SPEP AND MAURICE, SERUM         I attest that 60 minutes was spent on today's visit reviewing medical records and diagnostic testing deemed pertinent to this patient's care, along with direct time spent at patient's visit including the history, physical assessment and plan, discussing diagnosis and management along with documentation.       HPI    Patient was referred to the practice for the following reasons: Balance issues    Patient is here with her daughter but history is obtained almost exclusively from the patient with validation from the daughter; daughter's name is Patricia    Since Christmas 2020: 3 significant falls; not dizzy based more off balance  First one: just fell down and \"took out the [employer] tree\"  February 2021: fell forward for no reason   May 2021: going up the steps and fell backwards, maybe had purse and lemonaid in her hand; fell down backward on concrete    Over past few years: sometimes drags LT foot; no consistent  Sense of smell: no issues  Bowel habits: hx of IBS  Handwriting changes: has gotten small   Hands shake: fine tremor not necessarily resting        2013 patient was hospitalized for dizziness and vertigo  2013 patient had MRI of the brain along with MRA head neck all of which were unremarkable and with no acute process  Ultimately she had positive orthostatic but not hypotensive blood pressure readings but these were taken after she was in IV fluids overnight    2013 neurology consult notes that were reviewed indicates she has chronic tinnitus and been previously evaluated by ENT and was thought to be more of an age-related issue      Other significant comorbid conditions/concerns  Irritable bowel syndrome  History of pneumonia  History of small bowel obstruction  History of peptic ulcer disease  History of skin cancer: Basal cell and squamous cell  Anxiety  Arthritis  DDD: Lumbar and thoracic  Discogenic low back pain  Stage III chronic kidney disease  Osteopenia      Pertinent diagnostic data      Results from Hospital Encounter encounter on 05/05/13    MRI BRAIN W WO CONT      MRA BRAIN WO CONT      MRA NECK W CONT            Allergies   Allergen Reactions    Demerol [Meperidine] Nausea and Vomiting    Codeine Not Reported This Time    Penicillins Rash and Itching     Has tolerated zosyn       Current Outpatient Medications   Medication Sig    cyanocobalamin (VITAMIN B12) 100 mcg tablet Take 100 mcg by mouth daily.  ergocalciferol (ERGOCALCIFEROL) 1,250 mcg (50,000 unit) capsule Take 50,000 Units by mouth.  clemastine 2.68 mg tab tablet Take 1.34 mg by mouth daily as needed (Allergies).     polysorbate 80/glycerin (REFRESH DRY EYE THERAPY OP) Apply 2 Drops to eye daily.  ALPRAZolam (XANAX) 0.5 mg tablet Take 0.5 mg by mouth two (2) times a day.  vit C/E/Zn/coppr/lutein/zeaxan (PRESERVISION AREDS 2 PO) Take 40 mg by mouth daily. Indications: eye lubrication    fluoxetine (PROZAC) 40 mg capsule Take 40 mg by mouth every morning.  aspirin 81 mg tablet Take 81 mg by mouth every morning.  CALCIUM CARBONATE/VITAMIN D3 (CALTRATE-600 PLUS VITAMIN D3 PO) Take 1 Tab by mouth every morning.  raNITIdine (ZANTAC) 150 mg tablet Take 150 mg by mouth daily as needed for Indigestion (acid reflux). (Patient not taking: Reported on 6/16/2021)    vitamin E (AQUA GEMS) 400 unit capsule Take 400 Units by mouth daily. (Patient not taking: Reported on 6/16/2021)    meclizine (ANTIVERT) 25 mg tablet Take 25 mg by mouth three (3) times daily as needed for Dizziness (vertigo). Indications: sensation of spinning or whirling (Patient not taking: Reported on 6/16/2021)     No current facility-administered medications for this visit. Past medical history/surgical history, family history, and social history have been reviewed for today's visit      ROS    A ten system review of constitutional, cardiovascular, respiratory, musculoskeletal, endocrine, skin, SHEENT, genitourinary, psychiatric and neurologic systems was obtained and is unremarkable except as mentioned under HPI          EXAMINATION:     Visit Vitals  /60 (BP 1 Location: Left upper arm, BP Patient Position: Sitting, BP Cuff Size: Adult)   Pulse 87   Temp 98.1 °F (36.7 °C)   Resp 16   Wt 168 lb (76.2 kg)   SpO2 96%   BMI 26.31 kg/m²         General appearance: Patient is well-developed and well-nourished in no apparent distress and well groomed. Psych/mental health:  Affect: Appropriate    PHQ  No flowsheet data found.     HEENT:   Normocephalic  With evidence of trauma: No  Full range of motion head neck: Yes  Tenderness to palpation of the head neck region: No      Cardiovascular: Extremities warm to touch:Yes  Extremity swelling: No  Discoloration: No  Evidence of PVD: No    Respiratory:   Dyspnea on exertion: No   Abnormal effort on casual observation: No   Use of portable oxygen: No   Evidence of cyanosis: No     Musculoskeletal:   Evidence of significant bone deformities: No   Spinal curvature: No     Integumentary:    Obvious bruising: No   Lacerations or discoloration on casual observation: No       Neurological Examination:   Mental Status:        MMSE  No flowsheet data found. Patient was alert and oriented x3  She knew that Father's Day was upcoming this weekend  there was nothing concerning on general observation and discussion.    Alert oriented and appropriate to general conversation  Normal processing on general observation  Followed conversation and responded seemingly appropriate throughout the office visit  No word finding difficulties noted on casual observation  Able to follow directions without difficulty     Cranial Nerves:    EOMs intact gaze is conjugate  No nystagmus is appreciated  Facial motor intact bilaterally  Hearing intact to conversation  Voice with normal projection, no evidence of secretion pooling  Shoulder shrug intact bilaterally  No tongue deviation appreciated     Motor:   Normal bulk  No tremor appreciated on today's exam  No abnormal movements appreciated on today's exam  Moves extremities spontaneously and with purpose  5/5 x 4  Difficulty getting out of chair wi arms crossed  Mild masked face but could be normal for older adult  Bradykinesia  No rigidity even with distraction      Sensation: Intact to light touch; decreased to Vibration at Left great toe    Coordination/Cerebellar:   FTN: Intact bilaterally but with bilateral terminal tremor  Unable to keep balance with feet together and eyes open and closed    Gait: Ambulates dependently but there is bradykinesia generalized mild, decreased associated movements mild shuffling of feet    Reflexes: Toes down going, knees brisk, ankles trace, UE +1    Fall risk assessment  No flowsheet data found. Follow-up and Dispositions    · Return in about 2 months (around 8/16/2021).            Wilson Boone MS, ANP-BC, [facility]

## 2021-06-17 LAB — TSH SERPL DL<=0.05 MIU/L-ACNC: 2.61 UIU/ML (ref 0.36–3.74)

## 2021-06-24 ENCOUNTER — OFFICE VISIT (OUTPATIENT)
Dept: NEUROLOGY | Age: 81
End: 2021-06-24
Payer: MEDICARE

## 2021-06-24 DIAGNOSIS — G60.9 IDIOPATHIC PERIPHERAL NEUROPATHY: Primary | ICD-10-CM

## 2021-06-24 PROCEDURE — 95885 MUSC TST DONE W/NERV TST LIM: CPT | Performed by: PSYCHIATRY & NEUROLOGY

## 2021-06-24 PROCEDURE — 95911 NRV CNDJ TEST 9-10 STUDIES: CPT | Performed by: PSYCHIATRY & NEUROLOGY

## 2021-06-24 PROCEDURE — 95886 MUSC TEST DONE W/N TEST COMP: CPT | Performed by: PSYCHIATRY & NEUROLOGY

## 2021-06-24 NOTE — PROCEDURES
ELECTRODIAGNOSTIC REPORT      Test Date:  2021    Patient: Lu Carrizales : 1940 Physician: Angeles Vogt M.D.   ID#: [id number] SEX: Female Ref. Phys: Batsheva Marroquin     Patient History / Exam:  Ion Davies [de-identified] y.o. female presents with imbalance. Query peripheral neuropathy    EMG & NCV Findings:  Evaluation of the left Fibular motor and the right Fibular motor nerves showed normal distal onset latency (L3.9, R4.6 ms), normal amplitude (L2.1, R2.7 mV), normal conduction velocity (B Fib-Ankle, L41, R46 m/s), and normal conduction velocity (Poplt-B Fib, L45, R45 m/s). The left tibial motor and the right tibial motor nerves showed normal distal onset latency (L5.9, R3.6 ms), normal amplitude (L6.1, R5.4 mV), and normal conduction velocity (Knee-Ankle, L41, R43 m/s). The left radial sensory nerve showed normal distal peak latency (2.4 ms) and normal amplitude (27.6 µV). The left Sup Fibular sensory and the right Sup Fibular sensory nerves showed no response (Lower leg). The left sural sensory and the right sural sensory nerves showed no response (Calf). All examined muscles (as indicated in the following table) showed no evidence of electrical instability. Impression: This is an abnormal study. 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 ___________________________  S.  Baltazar Hill M.D.    Nerve Conduction Studies  Anti Sensory Summary Table     Stim Site NR Onset (ms) Peak (ms) O-P Amp (µV) Norm Peak (ms) Norm O-P Amp Site1 Site2 Dist (cm) Norm Yusef (m/s)   Left Radial Anti Sensory (Base 1st Digit)   Wrist    1.8 2.4 27.6 <2.9 >15 Wrist Base 1st Digit 10.0    Left Sup Fibular Anti Sensory (Lat ankle)   Lower leg NR    <4.4 >5.0 Lower leg Lat ankle 10.0 >32   Right Sup Fibular Anti Sensory (Lat ankle)   Lower leg NR    <4.4 >5.0 Lower leg Lat ankle 10.0 >32   Left Sural Anti Sensory (Lat Mall)   Calf NR    <4.5 >4.0 Calf Lat Mall 14.0    Right Sural Anti Sensory (Lat Mall)   Calf NR    <4.5 >4.0 Calf Lat Mall 14.0      Motor Summary Table     Stim Site NR Onset (ms) Norm Onset (ms) O-P Amp (mV) Norm O-P Amp P-T Amp (mV) Site1 Site2 Dist (cm) Yusef (m/s)   Left Fibular Motor (Ext Dig Brev)   Ankle    3.9 <6.5 2.1 >1.1  Ankle Ext Dig Brev 8.0 21   B Fib    11.6  1.7   B Fib Ankle 31.5 41   Poplt    13.8  1.6   Poplt B Fib 10.0 45   Right Fibular Motor (Ext Dig Brev)   Ankle    4.6 <6.5 2.7 >1.1  Ankle Ext Dig Brev 8.0 17   B Fib    10.5  2.5   B Fib Ankle 27.0 46   Poplt    12.7  2.6   Poplt B Fib 10.0 45   Left Tibial Motor (Abd [place] Brev)   Ankle    5.9 <6.1 6.1 >4.4  Ankle Abd [place] Brev 8.0 14   Knee    16.3  5.5   Knee Ankle 43.0 41   Right Tibial Motor (Abd [place] Brev)   Ankle    3.6 <6.1 5.4 >4.4  Ankle Abd [place] Brev 8.0 22   Knee    12.6  2.6   Knee Ankle 39.0 43       EMG     Side Muscle Nerve Root Ins Act Fibs Psw Recrt Duration Amp Poly Comment   Left AntTibialis Dp Br Peron L4-5 Nml Nml Nml Nml Nml Nml Nml    Left MedGastroc Tibial S1-2 Nml Nml Nml Nml Nml Nml Nml    Left VastusLat Femoral L2-4 Nml Nml Nml Nml Nml Nml Nml    Left BicepsFemS Sciatic L5-S1 Nml Nml Nml Nml Nml Nml Nml    Left Tensor Fascia Kary Sciatic L5 Nml Nml Nml Nml Nml Nml Nml    Left 1stDorInt Ulnar C8-T1 Nml Nml Nml Nml Nml Nml Nml    Left Triceps Radial C6-7-8 Nml Nml Nml Nml Nml Nml Nml      Waveforms:

## 2021-07-14 ENCOUNTER — HOSPITAL ENCOUNTER (OUTPATIENT)
Dept: NON INVASIVE DIAGNOSTICS | Age: 81
Discharge: HOME | End: 2021-07-14
Payer: MEDICARE

## 2021-07-14 ENCOUNTER — APPOINTMENT (OUTPATIENT)
Dept: NEUROLOGY | Age: 81
End: 2021-07-14
Payer: MEDICARE

## 2021-07-14 ENCOUNTER — APPOINTMENT (OUTPATIENT)
Dept: MRI IMAGING | Age: 81
End: 2021-07-14
Payer: MEDICARE

## 2021-07-14 DIAGNOSIS — H02.403 PTOSIS OF BOTH EYELIDS: ICD-10-CM

## 2021-07-14 DIAGNOSIS — R29.6 FALLS FREQUENTLY: ICD-10-CM

## 2021-07-14 DIAGNOSIS — M35.9 AUTOIMMUNE DISEASE (HCC): ICD-10-CM

## 2021-07-14 DIAGNOSIS — R26.89 BALANCE DISORDER: ICD-10-CM

## 2021-07-14 PROCEDURE — 93225 XTRNL ECG REC<48 HRS REC: CPT

## 2021-07-20 PROCEDURE — 93227 XTRNL ECG REC<48 HR R&I: CPT | Performed by: INTERNAL MEDICINE

## 2021-07-26 ENCOUNTER — HOSPITAL ENCOUNTER (OUTPATIENT)
Dept: MRI IMAGING | Age: 81
Discharge: HOME | End: 2021-07-26
Payer: MEDICARE

## 2021-07-26 ENCOUNTER — HOSPITAL ENCOUNTER (OUTPATIENT)
Dept: NEUROLOGY | Age: 81
Discharge: HOME | End: 2021-07-26
Payer: MEDICARE

## 2021-07-26 DIAGNOSIS — H02.403 PTOSIS OF BOTH EYELIDS: ICD-10-CM

## 2021-07-26 DIAGNOSIS — R29.6 FALLS FREQUENTLY: ICD-10-CM

## 2021-07-26 DIAGNOSIS — M35.9 AUTOIMMUNE DISEASE (HCC): ICD-10-CM

## 2021-07-26 DIAGNOSIS — G45.9 TIA (TRANSIENT ISCHEMIC ATTACK): ICD-10-CM

## 2021-07-26 DIAGNOSIS — R26.89 BALANCE DISORDER: ICD-10-CM

## 2021-07-26 PROCEDURE — 70553 MRI BRAIN STEM W/O & W/DYE: CPT

## 2021-07-26 PROCEDURE — 95816 EEG AWAKE AND DROWSY: CPT

## 2021-07-26 PROCEDURE — 74011636320 HC RX REV CODE- 636/320

## 2021-07-26 PROCEDURE — 95816 EEG AWAKE AND DROWSY: CPT | Performed by: PSYCHIATRY & NEUROLOGY

## 2021-07-26 PROCEDURE — A9576 INJ PROHANCE MULTIPACK: HCPCS

## 2021-07-26 RX ADMIN — GADOTERIDOL 15 ML: 279.3 INJECTION, SOLUTION INTRAVENOUS at 14:06

## 2021-08-18 ENCOUNTER — OFFICE VISIT (OUTPATIENT)
Dept: NEUROLOGY | Age: 81
End: 2021-08-18
Payer: MEDICARE

## 2021-08-18 VITALS
SYSTOLIC BLOOD PRESSURE: 110 MMHG | OXYGEN SATURATION: 98 % | DIASTOLIC BLOOD PRESSURE: 70 MMHG | HEIGHT: 67 IN | WEIGHT: 168 LBS | RESPIRATION RATE: 16 BRPM | HEART RATE: 78 BPM | TEMPERATURE: 98 F | BODY MASS INDEX: 26.37 KG/M2

## 2021-08-18 DIAGNOSIS — G62.89 PERIPHERAL AXONAL NEUROPATHY: Primary | ICD-10-CM

## 2021-08-18 DIAGNOSIS — R29.6 FALLS FREQUENTLY: ICD-10-CM

## 2021-08-18 DIAGNOSIS — I67.9 SMALL VESSEL DISEASE, CEREBROVASCULAR: ICD-10-CM

## 2021-08-18 DIAGNOSIS — R26.89 BALANCE DISORDER: ICD-10-CM

## 2021-08-18 PROCEDURE — 1090F PRES/ABSN URINE INCON ASSESS: CPT | Performed by: PSYCHIATRY & NEUROLOGY

## 2021-08-18 PROCEDURE — G8400 PT W/DXA NO RESULTS DOC: HCPCS | Performed by: PSYCHIATRY & NEUROLOGY

## 2021-08-18 PROCEDURE — G8432 DEP SCR NOT DOC, RNG: HCPCS | Performed by: PSYCHIATRY & NEUROLOGY

## 2021-08-18 PROCEDURE — G8427 DOCREV CUR MEDS BY ELIG CLIN: HCPCS | Performed by: PSYCHIATRY & NEUROLOGY

## 2021-08-18 PROCEDURE — G8419 CALC BMI OUT NRM PARAM NOF/U: HCPCS | Performed by: PSYCHIATRY & NEUROLOGY

## 2021-08-18 PROCEDURE — 1101F PT FALLS ASSESS-DOCD LE1/YR: CPT | Performed by: PSYCHIATRY & NEUROLOGY

## 2021-08-18 PROCEDURE — G8536 NO DOC ELDER MAL SCRN: HCPCS | Performed by: PSYCHIATRY & NEUROLOGY

## 2021-08-18 PROCEDURE — 99215 OFFICE O/P EST HI 40 MIN: CPT | Performed by: PSYCHIATRY & NEUROLOGY

## 2021-08-18 RX ORDER — CLINDAMYCIN HYDROCHLORIDE 150 MG/1
CAPSULE ORAL
COMMUNITY
Start: 2021-08-12 | End: 2022-02-22

## 2021-08-18 NOTE — PATIENT INSTRUCTIONS
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  Most people have what we call idiopathic [meaning no known cause] neuropathy and this is certainly the cause of your balance and falls because your feet do not send the correct messages to your brain    There is nothing we can do to make this better so you just need to be more mindful when you are walking in terms of where your feet you are do not carry a lot of things in your arms make sure you hold onto banisters make sure the light is on when you are walking in the evening hours around your house or outside as your brain will rely more on your vision to allow your brain to know your body parts are in space    There is information below regarding peripheral neuropathy as well as fall precautions to help reduce your risk of future falls    Your MRI of the brain does show what we call small vessel disease it is mild and it is normal given your age. Continue on your baby aspirin daily which is the best prevention against the development of any further small vessel strokes. Your Holter monitor which was the test that looked at your heart rate was essentially unremarkable there were few irregular heartbeats but we all have that and it was nothing significant    At this point we will see you back as needed if you have any questions or concerns she can send us a note through my chart we will try to address it that way otherwise we will bring you into the office for further evaluation    Continue to follow-up with primary care and any of your other specialist as appropriate    Office Policies    o Phone calls/patient messages:  Please allow up to 24 hours for someone in the office to contact you about your call or message.  Be mindful your provider may be out of the office or your message may require further review. We encourage you to use [employer] for your messages as this is a faster, more efficient way to communicate with our office    o Medication Refills:  Prescription medications require up to 48 business hours to process. We encourage you to use [employer] for your refills. For controlled medications: Please allow up to 72 business hours to process. Certain medications may require you to  a written prescription at our office. NO narcotic/controlled medications will be prescribed after 4pm Monday through Friday or on weekends    o Form/Paperwork Completion:  We ask that you allow 7-14 business days. You may also download your forms to [employer] to have your doctor print off. Learning About Peripheral Neuropathy  What is peripheral neuropathy? Peripheral neuropathy is a problem that affects the peripheral nerves. These nerves lead from the spinal cord to other parts of the body. They control your sense of touch, how you feel pain and temperature, and your muscle strength. Most of the time the problem starts in the fingers and toes. As it gets worse, it moves into the limbs. It can cause pain and loss of feeling in the feet, legs, and hands. What causes it? There are several causes:  · Diabetes. This is the most common cause. If your blood sugar is too high for too long, it can damage the nerves. · Kidney problems. These can lead to toxic substances in the blood that damage nerves. · Low levels of thyroid hormone (hypothyroidism). This may cause swelling of the tissues around the nerves, which can put pressure on them. · Infectious or inflammatory diseases. Examples are HIV and Guillain-Barré syndrome. These diseases can damage the nerves. · Peripheral nerve injury. A physical injury can damage the nerves. Injuries can be from things like falls, car crashes, or playing sports. · Overusing alcohol and not eating a healthy diet.  These can lead to your body not having enough of certain vitamins, such as vitamin B-12. This can damage nerves. · Being exposed to toxic substances. These include lead, mercury, and certain medicines, such as those used for chemotherapy. Sometimes the cause isn't known. What are the symptoms? Symptoms of peripheral neuropathy can occur slowly over time. The most common ones are:  · Numbness, tightness, and tingling, especially in the legs, hands, and feet. · Loss of feeling. · Burning, shooting, or stabbing pain in the legs, hands, and feet. Often the pain is worse at night. · Weakness and loss of balance. What can happen if you have it? If peripheral neuropathy gets worse, it can lead to a complete lack of feeling in your hands or feet. This can make you more likely to injure them. It may lead to calluses and blisters. It can also lead to bone and joint problems, infection, and ulcers. For instance, small, repeated injuries to the foot may lead to bigger problems. This can happen because you can't feel the injuries. Reduced feeling in the feet can also change your step, leading to bone or joint problems. If untreated, foot problems can become so severe that the foot or lower leg may have to be amputated. But treatment can slow down peripheral neuropathy. And it's a good idea to take care to avoid injury. How is it diagnosed? To diagnose peripheral neuropathy, your doctor will ask you about:  · Your symptoms. · Your medical history. This may include your use of alcohol, risk of HIV infection, or exposure to toxic substances. · Your family's medical history, including nerve disease. Your doctor may test how well you can feel touch, temperature, and pain. You may also have blood tests. These tests will help the doctor find out if you have conditions that can cause neuropathy. Examples are diabetes, vitamin deficiencies, thyroid disease, and kidney problems. How is it treated?   Treatment for peripheral neuropathy can relieve symptoms. This is done by treating the health problem that's causing it. For example, if you have diabetes, keeping your blood sugar within your target range may help. Or maybe your body lacks certain vitamins caused by drinking too much alcohol. In that case, treatment may include eating a healthy diet, taking vitamins, and stopping alcohol use. You may have physical therapy. This can increase muscle strength and help build muscle control. Over-the-counter medicine can relieve mild nerve pain. Your doctor may also prescribe medicine to help with severe pain, numbness, tingling, and weakness. If you have neuropathy in your feet, it's a good idea to have them checked during each office visit. This can help prevent problems. Some people find that physical therapy, acupuncture, or transcutaneous electrical nerve stimulation (TENS) helps relieve pain. Follow-up care is a key part of your treatment and safety. Be sure to make and go to all appointments, and call your doctor if you are having problems. It's also a good idea to know your test results and keep a list of the medicines you take. Where can you learn more? Go to http://www.gray.com/  Enter P130 in the search box to learn more about \"Learning About Peripheral Neuropathy. \"  Current as of: August 31, 2020               Content Version: 12.8  © 2006-2021 Healthwise, Incorporated. Care instructions adapted under license by [employer] (which disclaims liability or warranty for this information). If you have questions about a medical condition or this instruction, always ask your healthcare professional. [place] [address] any warranty or liability for your use of this information. Preventing Falls: Care Instructions  Your Care Instructions     Getting around your home safely can be a challenge if you have injuries or health problems that make it easy for you to fall.  Loose rugs and furniture in walkways are among the dangers for many older people who have problems walking or who have poor eyesight. People who have conditions such as arthritis, osteoporosis, or dementia also have to be careful not to fall. You can make your home safer with a few simple measures. Follow-up care is a key part of your treatment and safety. Be sure to make and go to all appointments, and call your doctor if you are having problems. It's also a good idea to know your test results and keep a list of the medicines you take. How can you care for yourself at home? Taking care of yourself  · You may get dizzy if you do not drink enough water. To prevent dehydration, drink plenty of fluids. Choose water and other caffeine-free clear liquids. If you have kidney, heart, or liver disease and have to limit fluids, talk with your doctor before you increase the amount of fluids you drink. · Exercise regularly to improve your strength, muscle tone, and balance. Walk if you can. Swimming may be a good choice if you cannot walk easily. · Have your vision and hearing checked each year or any time you notice a change. If you have trouble seeing and hearing, you might not be able to avoid objects and could lose your balance. · Know the side effects of the medicines you take. Ask your doctor or pharmacist whether the medicines you take can affect your balance. Sleeping pills or sedatives can affect your balance. · Limit the amount of alcohol you drink. Alcohol can impair your balance and other senses. · Ask your doctor whether calluses or corns on your feet need to be removed. If you wear loose-fitting shoes because of calluses or corns, you can lose your balance and fall. · Talk to your doctor if you have numbness in your feet. Preventing falls at home  · Remove raised doorway thresholds, throw rugs, and clutter. Repair loose carpet or raised areas in the floor.   · Move furniture and electrical cords to keep them out of walking paths.  · Use nonskid floor wax, and wipe up spills right away, especially on ceramic tile floors. · If you use a walker or cane, put rubber tips on it. If you use crutches, clean the bottoms of them regularly with an abrasive pad, such as steel wool. · Keep your house well lit, especially [place], and outside walkways. Use night-lights in areas such as hallways and bathrooms. Add extra light switches or use remote switches (such as switches that go on or off when you clap your hands) to make it easier to turn lights on if you have to get up during the night. · Install sturdy handrails on stairways. · Move items in your cabinets so that the things you use a lot are on the lower shelves (about waist level). · Keep a cordless phone and a flashlight with new batteries by your bed. If possible, put a phone in each of the main rooms of your house, or carry a cell phone in case you fall and cannot reach a phone. Or, you can wear a device around your neck or wrist. You push a button that sends a signal for help. · Wear low-heeled shoes that fit well and give your feet good support. Use footwear with nonskid soles. Check the heels and soles of your shoes for wear. Repair or replace worn heels or soles. · Do not wear socks without shoes on wood floors. · Walk on the grass when the sidewalks are slippery. If you live in an area that gets snow and ice in the winter, sprinkle salt on slippery steps and sidewalks. Preventing falls in the bath  · Install grab bars and nonskid mats inside and outside your shower or tub and near the toilet and sinks. · Use shower chairs and bath benches. · Use a hand-held shower head that will allow you to sit while showering. · Get into a tub or shower by putting the weaker leg in first. Get out of a tub or shower with your strong side first.  · Repair loose toilet seats and consider installing a raised toilet seat to make getting on and off the toilet easier.   · Keep your bathroom door unlocked while you are in the shower. Where can you learn more? Go to http://www.Maozhao.com/  Enter G117 in the search box to learn more about \"Preventing Falls: Care Instructions. \"  Current as of: December 7, 2020               Content Version: 12.8  © 5347-4478 [employer]. Care instructions adapted under license by [employer] (which disclaims liability or warranty for this information). If you have questions about a medical condition or this instruction, always ask your healthcare professional. [address] any warranty or liability for your use of this information. How to Get Up Safely After a Fall: Care Instructions  Your Care Instructions     If you have injuries, health problems, or other reasons that may make it easy for you to fall at home, it is a good idea to learn how to get up safely after a fall. Learning how to get up correctly can help you avoid making an injury worse. Also, knowing what to do if you cannot get up can help you stay safe until help arrives. Follow-up care is a key part of your treatment and safety. Be sure to make and go to all appointments, and call your doctor if you are having problems. It's also a good idea to know your test results and keep a list of the medicines you take. How can you care for yourself after a fall? If you think you can get up  First lie still for a few minutes and think about how you feel. If your body feels okay and you think you can get up safely, follow the rest of the steps below:  1. Look for a chair or other piece of furniture that is close to you. 2. Roll onto your side and rest. Roll by turning your head in the direction you want to roll, move your shoulder and arm, then hip and leg in the same direction.   3. Lie still for a moment to let your blood pressure adjust.  4. Slowly push your upper body up, lift your head, and take a moment to rest.  5. Slowly get up on your hands and knees, and crawl to the chair or other stable piece of furniture. 6. Put your hands on the chair. 7. Move one foot forward, and place it flat on the floor. Your other leg should be bent with the knee on the floor. 8. Rise slowly, turn your body, and sit in the chair. Stay seated for a bit and think about how you feel. Call for help. Even if you feel okay, let someone know what happened to you. You might not know that you have a serious injury. If you cannot get up  1. If you think you are injured after a fall or you cannot get up, try not to panic. 2. Call out for help. 3. If you have a phone within reach or you have an emergency call device, use it to call for help. 4. If you do not have a phone within reach, try to slide yourself toward it. If you cannot get to the phone, try to slide toward a door or window or a place where you think you can be heard. 5. [place] or use an object to make noise so someone might hear you. 6. If you can reach something that you can use for a pillow, place it under your head. Try to stay warm by covering yourself with a blanket or clothing while you wait for help. When should you call for help? Call 911 anytime you think you may need emergency care. For example, call if:    · You passed out (lost consciousness).     · You cannot get up after a fall.     · You have severe pain. Call your doctor now or seek immediate medical care if:    · You have new or worse pain.     · You are dizzy or lightheaded.     · You hit your head. Watch closely for changes in your health, and be sure to contact your doctor if:    · You do not get better as expected. Where can you learn more? Go to http://www.classmarkets.com/  Enter G513 in the search box to learn more about \"How to Get Up Safely After a Fall: Care Instructions. \"  Current as of: December 7, 2020               Content Version: 12.8  © 6965-5885 Healthwise, Incorporated. Care instructions adapted under license by [employer] (which disclaims liability or warranty for this information). If you have questions about a medical condition or this instruction, always ask your healthcare professional. [address] any warranty or liability for your use of this information.

## 2021-08-18 NOTE — ASSESSMENT & PLAN NOTE
due to idiopathic axonal neuropathy.   Other than being mindful and addressing fall precautions no further therapy at this time

## 2021-08-18 NOTE — ASSESSMENT & PLAN NOTE
mild and consistent with patient's age.   She is already on baby aspirin daily she is to continue with that and continue to keep any risk factors and comorbid conditions under good control

## 2021-08-18 NOTE — LETTER
8/18/2021    Patient: Nadia Jean   YOB: 1940   Date of Visit: 8/18/2021     [place] [id number]  Via Fax: [phone]    Dear Karen Brown MD,      Thank you for referring Ms. Etta Coburn to [address] [place] [address] for evaluation. My notes for this consultation are attached. If you have questions, please do not hesitate to call me. I look forward to following your patient along with you.       Sincerely,    Vinny Verma NP

## 2021-08-18 NOTE — ASSESSMENT & PLAN NOTE
supported by EMG from July 2021 other than issues related to falls and balance patient is asymptomatic and so is not demonstrate any motor or sensory difficulties therefore no medications or other aggressive interventions will be prescribed at this time.   Patient has been reminded to be mindful and we talked about safety precautions to help eliminate or reduce risk of falls information on AVS was also given    Patient was evaluated for other metabolic infectious inflammatory indicators that could be contributing to this diagnosis all of which returned normal    Since no further aggressive therapy is needed she will just follow-up as needed

## 2021-08-18 NOTE — PROGRESS NOTES
Nba Daniels is a 80 y.o. female  Chief Complaint   Patient presents with    Follow-up     1. Have you been to the ER, urgent care clinic since your last visit? yes injury to face. Hospitalized since your last visit?no    2. Have you seen or consulted any other health care providers outside of the [address] [place] [address] since your last visit? Include any pap smears or colon screening.  No  Health Maintenance   Topic Date Due    DTaP/Tdap/Td series (1 - Tdap) Never done    Shingrix Vaccine Age 50> (1 of 2) Never done    Bone Densitometry (Dexa) Screening  Never done    Medicare Yearly Exam  Never done    Flu Vaccine (1) 09/01/2021    COVID-19 Vaccine  Completed    Pneumococcal 65+ years  Completed     Visit Vitals  /70 (BP 1 Location: Left upper arm, BP Patient Position: At rest, BP Cuff Size: Large adult)   Pulse 78   Temp 98 °F (36.7 °C) (Skin)   Resp 16   Ht 5' 7\" (1.702 m)   Wt 168 lb (76.2 kg)   SpO2 98%   BMI 26.31 kg/m²

## 2021-08-18 NOTE — ASSESSMENT & PLAN NOTE
secondary to axonal neuropathy. Discussed fall precautions remaining mindful when ambulating to help reduce risk of falls.   Information was provided regarding falls/prevention on the AVS as well

## 2021-08-18 NOTE — PROCEDURES
Patient Name: Avril Salmeron  : 1940  Age: 80 y.o. Ordering physician: Jovany Hernández  Date of EE21  EEG procedure number: [id number]  Diagnosis: Altered mental status  Interpreting physician: Charmayne Modest, MD       ELECTROENCEPHALOGRAM REPORT     PROCEDURE: EEG. CLINICAL INDICATION: The patient is a 80 y.o. female who is being evaluated for baseline electro cerebral activities and to rule out seizure focus. EEG CLASSIFICATION: Normal    DESCRIPTION OF THE RECORD:   The background of this recording contains a posteriorly-located occipital alpha rhythm of 9-10 Hz that attenuates with eye opening. Throughout the recording, there were no clear areas of focal slowing nor spike or spike-and-wave discharges seen. Hyperventilation was not performed. Photic stimulation produced minimal driving response in the posterior head regions. During the recording the patient did not achieve stage II sleep    INTERPRETATION: This is a normal electroencephalogram with the patient awake and asleep, showing no clear focal abnormalities or epileptiform activity. A normal EEG does not rule out a diagnosis of epilepsy or seizures. One may consider pursuing a prolonged study. Clinical correlation recommended.     Charmayne Modest, MD

## 2021-08-18 NOTE — PROGRESS NOTES
Leon[facility] 83  In Office Follow-Up Pt VISIT         Yoli Espinoza is a 80 y.o. female who presents today for the following:  Chief Complaint   Patient presents with    Follow-up         ASSESSMENT AND PLAN    1. Peripheral axonal neuropathy  Assessment & Plan:    supported by EMG from July 2021 other than issues related to falls and balance patient is asymptomatic and so is not demonstrate any motor or sensory difficulties therefore no medications or other aggressive interventions will be prescribed at this time. Patient has been reminded to be mindful and we talked about safety precautions to help eliminate or reduce risk of falls information on AVS was also given    Patient was evaluated for other metabolic infectious inflammatory indicators that could be contributing to this diagnosis all of which returned normal    Since no further aggressive therapy is needed she will just follow-up as needed  2. Balance disorder  Assessment & Plan:    due to idiopathic axonal neuropathy. Other than being mindful and addressing fall precautions no further therapy at this time  3. Falls frequently  Assessment & Plan:    secondary to axonal neuropathy. Discussed fall precautions remaining mindful when ambulating to help reduce risk of falls. Information was provided regarding falls/prevention on the AVS as well  4. Small vessel disease, cerebrovascular  Assessment & Plan:    mild and consistent with patient's age. She is already on baby aspirin daily she is to continue with that and continue to keep any risk factors and comorbid conditions under good control        Patient and/or family was given time to ask questions and voice concerns. I believe all questions concerns were adequately addressed at this  office visit. Patient and/or family also verbalized agreement and understanding of the above-stated plan            ICD-10-CM ICD-9-CM    1. Peripheral axonal neuropathy  G62.89 356.8    2. Balance disorder  R26.89 781.99    3. Falls frequently  R29.6 V15.88    4. Small vessel disease, cerebrovascular  I67.9 437.9              HPI  Historical data:  Patient was referred to the practice for the following reasons: Balance issues    Patient is here with her daughter but history is obtained almost exclusively from the patient with validation from the daughter; daughter's name is Kaley Agarwal    Since Christmas 2020: 3 significant falls; not dizzy based more off balance  First one: just fell down and \"took out the Implis[employer] tree\"  February 2021: fell forward for no reason   May 2021: going up the steps and fell backwards, maybe had purse and lemonaid in her hand; fell down backward on concrete    Over past few years: sometimes drags LT foot; no consistent  Sense of smell: no issues  Bowel habits: hx of IBS  Handwriting changes: has gotten small   Hands shake: fine tremor not necessarily resting        2013 patient was hospitalized for dizziness and vertigo  2013 patient had MRI of the brain along with MRA head neck all of which were unremarkable and with no acute process  Ultimately she had positive orthostatic but not hypotensive blood pressure readings but these were taken after she was in IV fluids overnight    2013 neurology consult notes that were reviewed indicates she has chronic tinnitus and been previously evaluated by ENT and was thought to be more of an age-related issue      Other significant comorbid conditions/concerns  Irritable bowel syndrome  History of pneumonia  History of small bowel obstruction  History of peptic ulcer disease  History of skin cancer: Basal cell and squamous cell  Anxiety  Arthritis  DDD: Lumbar and thoracic  Discogenic low back pain  Stage III chronic kidney disease  Osteopenia    Interim data:     March Millin x 1 since last OV   Tripped on some furniture  Otherwise nothing different from what is addressed under HPI historical data    Reviewed the results of all the patient's test today in the office with her daughter          Pertinent diagnostic data  EEG completed 7/26/2020  Preliminary results: normal    Holter monitor 48 hours completed 7/14/2021  Interpretation:   1. The rhythm was Sinus with First degree AV Block. 2. QRS are within normal limits. 3. (33) single SVEs, (2) paired,   4. No diary was submitted, no triggered events noted. EMG completed 6/24/2021 completed by Dr. Loza Presummagdy   impression: This is an abnormal study. 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 Results from [place] encounter on 07/26/21    MRI BRAIN W WO CONT    Impression  Mild chronic microvascular ischemic change. Normal MR evaluation of the skull base. Otherwise normal MRI of the brain. No intracranial mass, hemorrhage or evidence of acute infarction. Results from Hospital Encounter encounter on 05/05/13    MRI BRAIN W WO CONT      MRA BRAIN WO CONT            Allergies   Allergen Reactions    Demerol [Meperidine] Nausea and Vomiting    Codeine Not Reported This Time    Penicillins Rash and Itching     Has tolerated zosyn       Current Outpatient Medications   Medication Sig    clindamycin (CLEOCIN) 150 mg capsule TAKE 3 CAPSULES BY MOUTH THREE TIMES DAILY FOR 10 DAYS    cyanocobalamin (VITAMIN B12) 100 mcg tablet Take 100 mcg by mouth daily.  ergocalciferol (ERGOCALCIFEROL) 1,250 mcg (50,000 unit) capsule Take 50,000 Units by mouth.  clemastine 2.68 mg tab tablet Take 1.34 mg by mouth daily as needed (Allergies).  polysorbate 80/glycerin (REFRESH DRY EYE THERAPY OP) Apply 2 Drops to eye daily.  ALPRAZolam (XANAX) 0.5 mg tablet Take 0.5 mg by mouth two (2) times a day.  vit C/E/Zn/coppr/lutein/zeaxan (PRESERVISION AREDS 2 PO) Take 40 mg by mouth daily. Indications: eye lubrication    fluoxetine (PROZAC) 40 mg capsule Take 40 mg by mouth every morning.     aspirin 81 mg tablet Take 81 mg by mouth every morning.  CALCIUM CARBONATE/VITAMIN D3 (CALTRATE-600 PLUS VITAMIN D3 PO) Take 1 Tab by mouth every morning.  raNITIdine (ZANTAC) 150 mg tablet Take 150 mg by mouth daily as needed for Indigestion (acid reflux). (Patient not taking: Reported on 8/18/2021)    vitamin E (AQUA GEMS) 400 unit capsule Take 400 Units by mouth daily. (Patient not taking: Reported on 8/18/2021)    meclizine (ANTIVERT) 25 mg tablet Take 25 mg by mouth three (3) times daily as needed for Dizziness (vertigo). Indications: sensation of spinning or whirling (Patient not taking: Reported on 8/18/2021)     No current facility-administered medications for this visit. Past medical history/surgical history, family history, and social history have been reviewed for today's visit      ROS    A ten system review of constitutional, cardiovascular, respiratory, musculoskeletal, endocrine, skin, SHEENT, genitourinary, psychiatric and neurologic systems was obtained and is unremarkable except as mentioned under HPI          EXAMINATION:     Visit Vitals  /70 (BP 1 Location: Left upper arm, BP Patient Position: At rest, BP Cuff Size: Large adult)   Pulse 78   Temp 98 °F (36.7 °C) (Skin)   Resp 16   Ht 5' 7\" (1.702 m)   Wt 168 lb (76.2 kg)   SpO2 98%   BMI 26.31 kg/m²         General appearance: Patient is well-developed and well-nourished in no apparent distress and well groomed.     Psych/mental health:  Affect: Appropriate    PHQ  3 most recent PHQ Screens 8/18/2021   Little interest or pleasure in doing things Not at all   Feeling down, depressed, irritable, or hopeless Not at all   Total Score PHQ 2 0       HEENT:   Normocephalic  With evidence of trauma: No  Full range of motion head neck: Yes  Tenderness to palpation of the head neck region: No      Cardiovascular:     Extremities warm to touch:Yes  Extremity swelling: No  Discoloration: No  Evidence of PVD: No    Respiratory:   Dyspnea on exertion: No   Abnormal effort on casual observation: No   Use of portable oxygen: No   Evidence of cyanosis: No     Musculoskeletal:   Evidence of significant bone deformities: No   Spinal curvature: No     Integumentary:    Obvious bruising: No   Lacerations or discoloration on casual observation: No       Neurological Examination:   Mental Status:        MMSE  No flowsheet data found. Patient was alert and oriented x3  She knew that Father's Day was upcoming this weekend  there was nothing concerning on general observation and discussion. Alert oriented and appropriate to general conversation  Normal processing on general observation  Followed conversation and responded seemingly appropriate throughout the office visit  No word finding difficulties noted on casual observation  Able to follow directions without difficulty     Cranial Nerves:    EOMs intact gaze is conjugate  No nystagmus is appreciated  Facial motor intact bilaterally  Hearing intact to conversation  Voice with normal projection, no evidence of secretion pooling  Shoulder shrug intact bilaterally  No tongue deviation appreciated     Motor:   Normal bulk  No tremor appreciated on today's exam  No abnormal movements appreciated on today's exam  Moves extremities spontaneously and with purpose  5/5 x 4  Difficulty getting out of chair w/ arms crossed  Mild masked face but could be normal for older adult  Bradykinesia  No rigidity even with distraction      Sensation: Intact to light touch; decreased to Vibration at Left great toe    Coordination/Cerebellar:   FTN: Intact bilaterally but with bilateral terminal tremor  Unable to keep balance with feet together and eyes open and closed    Gait: Ambulates dependently but there is bradykinesia generalized mild, decreased associated movements mild shuffling of feet    Reflexes: Toes down going, knees brisk, ankles trace, UE +1    Fall risk assessment  Fall Risk Assessment, last 12 mths 8/18/2021   Able to walk?  Yes   Fall in past 15 months? 1   Do you feel unsteady? 1   Are you worried about falling 1   Is TUG test greater than 12 seconds? 1   Is the gait abnormal? 1   Number of falls in past 12 months 2   Fall with injury? 0           Follow-up and Dispositions    · Return if symptoms worsen or fail to improve.            Meggan Guzman MS, ANP-BC, [facility]

## 2021-09-14 ENCOUNTER — TRANSCRIBE ORDER (OUTPATIENT)
Dept: SCHEDULING | Age: 81
End: 2021-09-14

## 2021-09-14 DIAGNOSIS — Z78.0 MENOPAUSE: ICD-10-CM

## 2021-09-14 DIAGNOSIS — Z78.0 POSTMENOPAUSAL: ICD-10-CM

## 2021-09-14 DIAGNOSIS — M85.9 DISORDER OF BONE DENSITY AND STRUCTURE, UNSPECIFIED: Primary | ICD-10-CM

## 2021-09-14 DIAGNOSIS — M85.89 DISAPPEARING BONE DISEASE: Primary | ICD-10-CM

## 2022-02-22 NOTE — PERIOP NOTES
[facility]  Ambulatory Surgery Unit  Pre-operative Instructions    Surgery/Procedure Date  03/03            Tentative Arrival Time TBD      1. On the day of your surgery/procedure, please report to the Ambulatory Surgery Unit Registration Desk and sign in at your designated time. The Ambulatory Surgery Unit is located in [facility] on the [facility] side of the [facility] across from the [address] [place] [address]. Please have all of your health insurance cards and a photo ID. **Due to current COVID restrictions, only ONE adult may accompany you the day of the procedure. We have limited seating available. If our waiting room is at capacity, your ride may be asked to remain in their vehicle. No children are allowed in the waiting room. 2. You must have someone with you to drive you home, as you should not drive a car for 24 hours following anesthesia. Please make arrangements for a responsible adult friend or family member to stay with you for at least the first 24 hours after your surgery. 3. Do not have anything to eat or drink (including water, gum, mints, coffee, juice) after 11:59 PM  03/2. This may not apply to medications prescribed by your physician. (Please note below the special instructions with medications to take the morning of surgery, if applicable.)    4. We recommend you do not drink any alcoholic beverages for 24 hours before and after your surgery. 5. Contact your surgeons office for instructions on the following medications: non-steroidal anti-inflammatory drugs (i.e. Advil, Aleve), vitamins, and supplements. (Some surgeons will want you to stop these medications prior to surgery and others may allow you to take them)   **If you are currently taking Plavix, Coumadin, Aspirin and/or other blood-thinning agents, contact your surgeon for instructions. ** Your surgeon will partner with the physician prescribing these medications to determine if it is safe to stop or if you need to continue taking. Please do not stop taking these medications without instructions from your surgeon. 6. In an effort to help prevent surgical site infection, we ask that you shower with an anti-bacterial soap (i.e. Dial/Safeguard, or the soap provided to you at your preadmission testing appointment) for 3 days prior to and on the morning of surgery, using a fresh towel after each shower. (Please begin this process with fresh bed linens.) Do not apply any lotions, powders, or deodorants after the shower on the day of your procedure. If applicable, please do not shave the operative site for 48 hours prior to surgery. 7. Wear comfortable clothes. Wear glasses instead of contacts. Do not bring any jewelry or money (other than copays or fees as instructed). Do not wear make-up, particularly mascara, the morning of your surgery. Do not wear nail polish, particularly if you are having foot /hand surgery. Wear your hair loose or down, no ponytails, buns, cheri pins or clips. All body piercings must be removed. 8. You should understand that if you do not follow these instructions your surgery may be cancelled. If your physical condition changes (i.e. fever, cold or flu) please contact your surgeon as soon as possible. 9. It is important that you be on time. If a situation occurs where you may be late, or if you have any questions or problems, please call [phone].    10. Your surgery time may be subject to change. You will receive a phone call the day prior to surgery to confirm your arrival time. Special Instructions: Take all medications and inhalers, as prescribed, on the morning of surgery with a sip of water EXCEPT: n/a      I understand a pre-operative phone call will be made to verify my surgery time. In the event that I am not available, I give permission for a message to be left on my answering service and/or with another person?       yes         ___________________ ___________________      ________________  (Signature of Patient)          (Witness)                   (Date and Time)

## 2022-02-28 ENCOUNTER — HOSPITAL ENCOUNTER (OUTPATIENT)
Dept: PREADMISSION TESTING | Age: 82
Discharge: HOME | End: 2022-02-28
Payer: MEDICARE

## 2022-02-28 PROCEDURE — U0005 INFEC AGEN DETEC AMPLI PROBE: HCPCS

## 2022-03-01 LAB
SARS-COV-2, XPLCVT: NOT DETECTED
SOURCE, COVRS: NORMAL

## 2022-03-02 ENCOUNTER — ANESTHESIA EVENT (OUTPATIENT)
Dept: SURGERY | Age: 82
End: 2022-03-02
Payer: MEDICARE

## 2022-03-03 ENCOUNTER — HOSPITAL ENCOUNTER (OUTPATIENT)
Age: 82
Setting detail: OUTPATIENT SURGERY
Discharge: HOME | End: 2022-03-03
Attending: OPHTHALMOLOGY | Admitting: OPHTHALMOLOGY
Payer: MEDICARE

## 2022-03-03 ENCOUNTER — ANESTHESIA (OUTPATIENT)
Dept: SURGERY | Age: 82
End: 2022-03-03
Payer: MEDICARE

## 2022-03-03 VITALS
HEART RATE: 65 BPM | WEIGHT: 175 LBS | OXYGEN SATURATION: 96 % | BODY MASS INDEX: 27.47 KG/M2 | HEIGHT: 67 IN | SYSTOLIC BLOOD PRESSURE: 124 MMHG | TEMPERATURE: 97.6 F | DIASTOLIC BLOOD PRESSURE: 51 MMHG | RESPIRATION RATE: 12 BRPM

## 2022-03-03 PROCEDURE — 2709999900 HC NON-CHARGEABLE SUPPLY: Performed by: OPHTHALMOLOGY

## 2022-03-03 PROCEDURE — V2787 ASTIGMATISM-CORRECT FUNCTION: HCPCS | Performed by: OPHTHALMOLOGY

## 2022-03-03 PROCEDURE — 76030000000 HC AMB SURG OR TIME 0.5 TO 1: Performed by: OPHTHALMOLOGY

## 2022-03-03 PROCEDURE — 74011000250 HC RX REV CODE- 250: Performed by: OPHTHALMOLOGY

## 2022-03-03 PROCEDURE — 77030003029 HC SUT VCRL J&J -B: Performed by: OPHTHALMOLOGY

## 2022-03-03 PROCEDURE — V2632 POST CHMBR INTRAOCULAR LENS: HCPCS | Performed by: OPHTHALMOLOGY

## 2022-03-03 PROCEDURE — 74011250636 HC RX REV CODE- 250/636: Performed by: NURSE ANESTHETIST, CERTIFIED REGISTERED

## 2022-03-03 PROCEDURE — 76060000061 HC AMB SURG ANES 0.5 TO 1 HR: Performed by: OPHTHALMOLOGY

## 2022-03-03 PROCEDURE — 76210000046 HC AMBSU PH II REC FIRST 0.5 HR: Performed by: OPHTHALMOLOGY

## 2022-03-03 PROCEDURE — 77030021352 HC CBL LD SYS DISP COVD -B: Performed by: OPHTHALMOLOGY

## 2022-03-03 PROCEDURE — 74011250636 HC RX REV CODE- 250/636: Performed by: OPHTHALMOLOGY

## 2022-03-03 PROCEDURE — 74011000250 HC RX REV CODE- 250

## 2022-03-03 DEVICE — ACRYSOF(R) IQ TORIC ASTIGMATISM IOL, SINGLE-PIECE ACRYLIC FOLDABLE LENS, UV WITH BLUE LIGHTFILTER, 13.0MM LENGTH, 6.0MM BICONVEX TORICASPHERIC OPTIC, 3.00 D CYLINDER.
Type: IMPLANTABLE DEVICE | Site: EYE | Status: FUNCTIONAL
Brand: ACRYSOF®

## 2022-03-03 RX ORDER — MIDAZOLAM HYDROCHLORIDE 1 MG/ML
INJECTION, SOLUTION INTRAMUSCULAR; INTRAVENOUS AS NEEDED
Status: DISCONTINUED | OUTPATIENT
Start: 2022-03-03 | End: 2022-03-03 | Stop reason: HOSPADM

## 2022-03-03 RX ORDER — DIPHENHYDRAMINE HYDROCHLORIDE 50 MG/ML
12.5 INJECTION, SOLUTION INTRAMUSCULAR; INTRAVENOUS AS NEEDED
Status: DISCONTINUED | OUTPATIENT
Start: 2022-03-03 | End: 2022-03-03 | Stop reason: HOSPADM

## 2022-03-03 RX ORDER — SODIUM CHLORIDE, SODIUM LACTATE, POTASSIUM CHLORIDE, CALCIUM CHLORIDE 600; 310; 30; 20 MG/100ML; MG/100ML; MG/100ML; MG/100ML
25 INJECTION, SOLUTION INTRAVENOUS CONTINUOUS
Status: DISCONTINUED | OUTPATIENT
Start: 2022-03-03 | End: 2022-03-03 | Stop reason: HOSPADM

## 2022-03-03 RX ORDER — LIDOCAINE HYDROCHLORIDE 10 MG/ML
0.1 INJECTION, SOLUTION EPIDURAL; INFILTRATION; INTRACAUDAL; PERINEURAL AS NEEDED
Status: DISCONTINUED | OUTPATIENT
Start: 2022-03-03 | End: 2022-03-03 | Stop reason: HOSPADM

## 2022-03-03 RX ORDER — SODIUM CHLORIDE 0.9 % (FLUSH) 0.9 %
5-40 SYRINGE (ML) INJECTION AS NEEDED
Status: DISCONTINUED | OUTPATIENT
Start: 2022-03-03 | End: 2022-03-03 | Stop reason: HOSPADM

## 2022-03-03 RX ORDER — TIMOLOL MALEATE 5 MG/ML
SOLUTION/ DROPS OPHTHALMIC AS NEEDED
Status: DISCONTINUED | OUTPATIENT
Start: 2022-03-03 | End: 2022-03-03 | Stop reason: HOSPADM

## 2022-03-03 RX ORDER — CIPROFLOXACIN HYDROCHLORIDE 3.5 MG/ML
1 SOLUTION/ DROPS TOPICAL
Status: DISCONTINUED | OUTPATIENT
Start: 2022-03-03 | End: 2022-03-03

## 2022-03-03 RX ORDER — SODIUM CHLORIDE 9 MG/ML
25 INJECTION, SOLUTION INTRAVENOUS CONTINUOUS
Status: DISCONTINUED | OUTPATIENT
Start: 2022-03-03 | End: 2022-03-03 | Stop reason: HOSPADM

## 2022-03-03 RX ORDER — TETRACAINE HYDROCHLORIDE 5 MG/ML
SOLUTION OPHTHALMIC AS NEEDED
Status: DISCONTINUED | OUTPATIENT
Start: 2022-03-03 | End: 2022-03-03 | Stop reason: HOSPADM

## 2022-03-03 RX ORDER — TROPICAMIDE 10 MG/ML
SOLUTION/ DROPS OPHTHALMIC
Status: COMPLETED
Start: 2022-03-03 | End: 2022-03-03

## 2022-03-03 RX ORDER — ONDANSETRON 2 MG/ML
4 INJECTION INTRAMUSCULAR; INTRAVENOUS AS NEEDED
Status: DISCONTINUED | OUTPATIENT
Start: 2022-03-03 | End: 2022-03-03 | Stop reason: HOSPADM

## 2022-03-03 RX ORDER — SODIUM CHLORIDE 0.9 % (FLUSH) 0.9 %
5-40 SYRINGE (ML) INJECTION EVERY 8 HOURS
Status: DISCONTINUED | OUTPATIENT
Start: 2022-03-03 | End: 2022-03-03 | Stop reason: HOSPADM

## 2022-03-03 RX ORDER — FENTANYL CITRATE 50 UG/ML
25 INJECTION, SOLUTION INTRAMUSCULAR; INTRAVENOUS
Status: DISCONTINUED | OUTPATIENT
Start: 2022-03-03 | End: 2022-03-03 | Stop reason: HOSPADM

## 2022-03-03 RX ORDER — CIPROFLOXACIN HYDROCHLORIDE 3.5 MG/ML
1 SOLUTION/ DROPS TOPICAL
Status: DISCONTINUED | OUTPATIENT
Start: 2022-03-03 | End: 2022-03-03 | Stop reason: HOSPADM

## 2022-03-03 RX ORDER — NEOMYCIN SULFATE, POLYMYXIN B SULFATE, AND DEXAMETHASONE 3.5; 10000; 1 MG/G; [USP'U]/G; MG/G
OINTMENT OPHTHALMIC AS NEEDED
Status: DISCONTINUED | OUTPATIENT
Start: 2022-03-03 | End: 2022-03-03 | Stop reason: HOSPADM

## 2022-03-03 RX ORDER — TROPICAMIDE 10 MG/ML
1 SOLUTION/ DROPS OPHTHALMIC
Status: COMPLETED | OUTPATIENT
Start: 2022-03-03 | End: 2022-03-03

## 2022-03-03 RX ADMIN — SODIUM CHLORIDE 25 ML/HR: 9 INJECTION, SOLUTION INTRAVENOUS at 12:07

## 2022-03-03 RX ADMIN — TROPICAMIDE 1 DROP: 10 SOLUTION/ DROPS OPHTHALMIC at 12:06

## 2022-03-03 RX ADMIN — TROPICAMIDE 1 DROP: 10 SOLUTION/ DROPS OPHTHALMIC at 12:21

## 2022-03-03 RX ADMIN — CIPROFLOXACIN HYDROCHLORIDE 1 DROP: 3.5 SOLUTION/ DROPS TOPICAL at 12:21

## 2022-03-03 RX ADMIN — MIDAZOLAM HYDROCHLORIDE 1 MG: 1 INJECTION, SOLUTION INTRAMUSCULAR; INTRAVENOUS at 12:45

## 2022-03-03 RX ADMIN — CIPROFLOXACIN 1 DROP: 3 SOLUTION OPHTHALMIC at 12:07

## 2022-03-03 RX ADMIN — TROPICAMIDE 1 DROP: 10 SOLUTION/ DROPS OPHTHALMIC at 12:17

## 2022-03-03 RX ADMIN — MIDAZOLAM HYDROCHLORIDE 1 MG: 1 INJECTION, SOLUTION INTRAMUSCULAR; INTRAVENOUS at 12:53

## 2022-03-03 NOTE — ANESTHESIA POSTPROCEDURE EVALUATION
Procedure(s):  CATARACT EXTRACTION WITH INTRA OCULAR LENS IMPLANT RIGHT EYE. MAC    Anesthesia Post Evaluation      Multimodal analgesia: multimodal analgesia used between 6 hours prior to anesthesia start to PACU discharge  Patient location during evaluation: PACU  Patient participation: complete - patient participated  Level of consciousness: awake and alert  Pain score: 0  Airway patency: patent  Anesthetic complications: no  Cardiovascular status: acceptable  Respiratory status: acceptable  Hydration status: acceptable  Post anesthesia nausea and vomiting:  none  Final Post Anesthesia Temperature Assessment:  Normothermia (36.0-37.5 degrees C)      INITIAL Post-op Vital signs:   Vitals Value Taken Time   /51 03/03/22 1349   Temp 36.4 °C (97.6 °F) 03/03/22 1335   Pulse 64 03/03/22 1350   Resp 16 03/03/22 1350   SpO2 96 % 03/03/22 1350   Vitals shown include unvalidated device data.

## 2022-03-03 NOTE — DISCHARGE INSTRUCTIONS
Ginny Herrera MD  [address] [place] [address] Drive   SANDRA Vivaru, [address]  Phone: [phone]  If you are unable to keep appointment, kindly give 24 hours notice please. REMOVE PATCH  START DROPS WHEN YOU GET HOME  PUT PATCH BACK ON AT BEDTIME    1. DO NOT RUB the eye that was operated on. 2. Do not strain excessively. It is all right to bend as long as you do not strain. 3. It is safe to take a shower, wash your face, and wash your hair. Just keep the eye closed. 4. Do not swim for 1 week after surgery. 5. If you have any problems or questions, do not hesitate to call .  6. Follow instructions on eye drops from office. 7. You may take Tylenol or Advil for discomfort. If it pressure not relieved by Tylenol or Advil, please call Dr. Leonora Faria office. TO PREVENT AN INFECTION      1. [address] YOUR HANDS     To prevent infection, good handwashing is the most important thing you or your caregiver can do.  Wash your hands with soap and water or use the hand  we gave you before you touch any wounds. 2.  USE CLEAN SHEETS     Use freshly cleaned sheets on your bed after surgery.  To keep the surgery site clean, do not allow pets to sleep with you while your wound is still healing. 3. STOP SMOKING    Stop smoking, or at least cut back on smoking     Smoking slows your healing. 4.  CONTROL YOUR BLOOD SUGAR     High blood sugars slow wound healing.  If you are diabetic, control your blood sugar levels before and after your surgery. If you were given prescriptions, please review the written information on the prescribed medications. DO NOT DRIVE WHILE TAKING NARCOTIC PAIN MEDICATIONS.     DISCHARGE SUMMARY from Nurse    The following personal items collected during your admission are returned to you:   Dental Appliance: Dental Appliances: None  Vision: Visual Aid: Glasses  Hearing Aid:    Jewelry: Jewelry: None  Clothing: Clothing: With patient  Other Valuables: Other Valuables: Eyeglasses (IN PACU)  Valuables sent to safe:      PATIENT INSTRUCTIONS:    After general anesthesia or intravenous sedation, for 24 hours or while taking prescription Narcotics:  · Someone should be with you for the next 24 hours. · For your own safety, a responsible adult must drive you home. · Limit your activities  · Recommended activity: Rest today, Do not climb stairs or shower unattended for the next 24 hours. · Do not drive and operate hazardous machinery  · Do not make important personal or business decisions  · Do  not drink alcoholic beverages  · If you have not urinated within 8 hours after discharge, please contact your surgeon on call. Report the following to your surgeon:  · Excessive pain, swelling, redness or odor of or around the surgical area  · Temperature over 100.5  · Nausea and vomiting lasting longer than 4 hours or if unable to take medications    · You will receive a Post Operative Call from one of the Recovery Room Nurses on the day after your surgery to check on you. It is very important for us to know how you are recovering after your surgery. · You may receive an e-mail or letter in the mail from [employer] regarding your experience with us in the Ambulatory Surgery Unit. Your feedback is valuable to us and we appreciate your participation in the survey. · We wish youre a speedy recovery ? What to do at Home:      *  Please give a list of your current medications to your Primary Care Provider. *  Please update this list whenever your medications are discontinued, doses are      changed, or new medications (including over-the-counter products) are added. *  Please carry medication information at all times in case of emergency situations.           These are general instructions for a healthy lifestyle:    No smoking/ No tobacco products/ Avoid exposure to second hand smoke    Surgeon General's Warning:  Quitting smoking now greatly reduces serious risk to your health. Obesity, smoking, and sedentary lifestyle greatly increases your risk for illness    A healthy diet, regular physical exercise & weight monitoring are important for maintaining a healthy lifestyle    You may be retaining fluid if you have a history of heart failure or if you experience any of the following symptoms:  Weight gain of 3 pounds or more overnight or 5 pounds in a week, increased swelling in our hands or feet or shortness of breath while lying flat in bed. Please call your doctor as soon as you notice any of these symptoms; do not wait until your next office visit. Recognize signs and symptoms of STROKE:    B - Balance  E - Eyes    F-face looks uneven    A-arms unable to move or move even    S-speech slurred or non-existent    T-time-call 911 as soon as signs and symptoms begin-DO NOT go       Back to bed or wait to see if you get better-TIME IS BRAIN. If you have not received your influenza and/or pneumococcal vaccine, please follow up with your primary care physician. The discharge information has been reviewed with the patient and caregiver. The patient and caregiver verbalized understanding.

## 2022-03-03 NOTE — PERIOP NOTES
Cody Jose A  1940  [id number]    Situation:  Verbal report given from: L. Marquetta Burkitt CRNA  Procedure: Procedure(s):  CATARACT EXTRACTION WITH INTRA OCULAR LENS IMPLANT RIGHT EYE    Background:    Preoperative diagnosis: Nuclear sclerotic cataract of right eye [H25.11]    Postoperative diagnosis: Nuclear sclerotic cataract of right eye [H25.11]    :  Dr. Marianela Ragland    Assistant(s): Circ-1: Jens Montalvo  Scrub Tech-1: Jay Powell    Specimens: * No specimens in log *    Assessment:  Intra-procedure medications         Anesthesia gave intra-procedure sedation and medications, see anesthesia flow sheet     Intravenous fluids: LR@ KVO     Vital signs stable       Recommendation:    Permission to share finding with daughter : yes

## 2022-03-03 NOTE — ANESTHESIA PREPROCEDURE EVALUATION
Anesthetic History     PONV          Review of Systems / Medical History  Patient summary reviewed, nursing notes reviewed and pertinent labs reviewed    Pulmonary          Pneumonia         Neuro/Psych         TIA (x 2, 2007) and psychiatric history (Anxiety)    Comments: Anxiety   Idiopathic axonal neuropathy causing balance problems & fall risk Cardiovascular  Within defined limits                Exercise tolerance: >4 METS  Comments: NSR   GI/Hepatic/Renal     GERD: well controlled    Renal disease (CKD stage III)  PUD    Comments: H/o SMALL BOWEL OBSTRUCTION  IBS Endo/Other        Arthritis and cancer (BCCa and SCCa face and leg)     Other Findings   Comments: Cataracts    Rheumatoid arthritis           Physical Exam    Airway  Mallampati: III  TM Distance: 4 - 6 cm  Neck ROM: normal range of motion   Mouth opening: Normal     Cardiovascular  Regular rate and rhythm,  S1 and S2 normal,  no murmur, click, rub, or gallop  Rhythm: regular  Rate: normal         Dental    Dentition: Caps/crowns  Comments: Upper front   Pulmonary  Breath sounds clear to auscultation               Abdominal  GI exam deferred       Other Findings            Anesthetic Plan    ASA: 3  Anesthesia type: MAC            Anesthetic plan and risks discussed with: Patient

## 2022-03-03 NOTE — BRIEF OP NOTE
Brief Postoperative Note    Patient: Checo Jim  YOB: 1940  MRN: [id number]    Date of Procedure: 3/3/2022     Pre-Op Diagnosis: Nuclear sclerotic cataract of right eye [H25.11]    Post-Op Diagnosis: Nuclear Sclerotic cataract right eye H25.11    Procedure(s):  CATARACT EXTRACTION WITH INTRA OCULAR LENS IMPLANT RIGHT EYE    Surgeon(s):  Ney Borrero MD    Surgical Assistant: None    Anesthesia: MAC     Estimated Blood Loss (mL): 0    Complications: none    Specimens: * No specimens in log *     Implants:   Implant Name Type Inv.  Item Serial No.  Lot No. LRB No. Used Action   LENS IO +27.0 DIOPT CYL PWR +3.00 DIOPT L13MM DIA6MM 0DEG - [id number]  LENS IO +27.0 DIOPT CYL PWR +3.00 DIOPT L13MM DIA6MM 0DEG [id number] [employer]WD NA Right 1 Implanted       Drains: * No LDAs found *    Findings: Visually significant cataract right eye    Electronically Signed by Omayra Chan MD on 3/3/2022 at 1:46 PM

## 2022-03-03 NOTE — OP NOTES
Date of Procedure: 3/3/2022  Preoperative Diagnosis: Nuclear Sclerotic cataract right eye H25.11  Postoperative Diagnosis: Nuclear Sclerotic cataract right eye H25.11  Procedure: Extracapsular cataract extraction with lens implant right eye (toric IOL)  Surgeon:  MARCIA Ham MD  Assistants: None  Anesthesia: MAC with local  Estimated Blood Loss: None  Findings: Cataract right eye  Complications: None  Specimens: None  Prosthetic Devices: Intraocular lens implant     The patient's right eye was dilated with mydriacyl 1% and ciprofloxacin 0.3% for 3 doses preoperatively. The patient was taken to the operating room and was given sedation. Tetracaine was given topically to the right eye, and the eye was prepped and draped in the usual manner for sterile eye surgery, including Betadine solution being dropped onto the conjunctiva at the beginning of the prep. The eyelashes were isolated with a plastic drape. A lid speculum was placed.     It was planned to use a toric intraocular lens for the procedure. A corneal marking gauge was used to manny the 175 degree axis temporally and nasally. The 6:00 position had been noted previously with the patient in the sitting position.     A #15 blade was used to make a paracentesis at the 10:00 location. The eye was flushed with a lidocaine / epinephrine mixture (\"Shugarcaine\"). The eye was filled with Viscoat (Duovisc), and a crescent blade was used to make a 2.5 mm incision at the limbus temporally. This was dissected 2 mm into clear cornea, and the eye was entered with a 2.4 mm keratome. A 0.12 forceps was used for fixation during the procedure. A capsulorhexis flap was started with a cystotome, and this was completed 360 degrees with Utrata forceps. The capsular piece was removed.   Leeds dissection was performed with the \"Shugarcaine\" mixture on a cannula.     The lens nucleus was removed using phacoemulsification with a total phaco time of 1:54 minutes at 14.2%.     The lens was cracked and manipulated with a lens chopper. Residual cortex was removed using irrigation / aspiration. The capsule remained intact.     The capsule was refilled with Provisc, and an Manuel Intraocular lens model [id number] power 27.0 was placed in a lens folding cartridge with Provisc.     The lens was unfolded into the capsular bag. The lens centered well. The lens was rotated so that the orientation manny on the lens matched the corneal marking (rotated just slighted counterclockwise in relation to the corneal marking). Residual Provisc and Viscoat were removed using I / A. The lens was then rotated to fully match the corneal incision marking.     After hydration of the incision, no suture was required to close the incision. The eye was flushed with BSS through the paracentesis. Betadine solution was placed on the conjunctival surface at the end of the case. The eye was left soft and formed at the end of the case. The incision site was water tight. The speculum was removed, and a drop of timolol 0.5% and neopolydex ointment was placed on the eye followed by a shield. The patient tolerated the procedure well and is to follow-up in one day.

## 2022-03-18 PROBLEM — I67.9 SMALL VESSEL DISEASE, CEREBROVASCULAR: Status: ACTIVE | Noted: 2021-08-18

## 2022-03-18 PROBLEM — G62.89 PERIPHERAL AXONAL NEUROPATHY: Status: ACTIVE | Noted: 2021-08-18

## 2022-03-19 PROBLEM — R26.89 BALANCE DISORDER: Status: ACTIVE | Noted: 2021-08-18

## 2022-03-19 PROBLEM — R29.6 FALLS FREQUENTLY: Status: ACTIVE | Noted: 2021-08-18

## 2022-03-19 PROBLEM — R32 URINARY INCONTINENCE: Status: ACTIVE | Noted: 2019-07-26

## 2022-06-02 NOTE — PROGRESS NOTES
Critical care interdisciplinary rounds held on 05/06/2019. Following members present, Pharmacy, Diabetes Treatment, Case Management, Respiratory Therapy and Nutrition. Led by SOHAN Del Cid RN. and Dr. Phylicia Wong. Plan of care discussed. See clinical pathway for plan of care and interventions and desired outcomes. Detail Level: Detailed

## 2022-07-28 ENCOUNTER — TRANSCRIBE ORDER (OUTPATIENT)
Dept: SCHEDULING | Age: 82
End: 2022-07-28

## 2022-07-28 DIAGNOSIS — K22.4 MOTILITY DISORDER, ESOPHAGEAL: ICD-10-CM

## 2022-07-28 DIAGNOSIS — R13.19 OTHER DYSPHAGIA: Primary | ICD-10-CM

## 2022-07-28 DIAGNOSIS — R14.2 ERUCTATION: ICD-10-CM

## 2022-07-28 DIAGNOSIS — R09.89 THROAT CLEARING: ICD-10-CM

## 2022-07-28 DIAGNOSIS — K21.9 GERD (GASTROESOPHAGEAL REFLUX DISEASE): ICD-10-CM

## 2022-07-28 DIAGNOSIS — K58.9 IRRITABLE BOWEL SYNDROME: ICD-10-CM

## 2022-08-18 ENCOUNTER — HOSPITAL ENCOUNTER (OUTPATIENT)
Dept: GENERAL RADIOLOGY | Age: 82
Discharge: HOME | End: 2022-08-18
Attending: INTERNAL MEDICINE
Payer: MEDICARE

## 2022-08-18 DIAGNOSIS — K21.9 GERD (GASTROESOPHAGEAL REFLUX DISEASE): ICD-10-CM

## 2022-08-18 DIAGNOSIS — K22.4 MOTILITY DISORDER, ESOPHAGEAL: ICD-10-CM

## 2022-08-18 DIAGNOSIS — R14.2 ERUCTATION: ICD-10-CM

## 2022-08-18 DIAGNOSIS — K58.9 IRRITABLE BOWEL SYNDROME: ICD-10-CM

## 2022-08-18 DIAGNOSIS — R09.89 THROAT CLEARING: ICD-10-CM

## 2022-08-18 DIAGNOSIS — R13.19 OTHER DYSPHAGIA: ICD-10-CM

## 2022-08-18 PROCEDURE — 74220 X-RAY XM ESOPHAGUS 1CNTRST: CPT

## 2022-09-26 ENCOUNTER — HOSPITAL ENCOUNTER (OUTPATIENT)
Age: 82
Setting detail: OUTPATIENT SURGERY
Discharge: HOME | End: 2022-09-26
Attending: INTERNAL MEDICINE | Admitting: INTERNAL MEDICINE
Payer: MEDICARE

## 2022-09-26 ENCOUNTER — ANESTHESIA (OUTPATIENT)
Dept: ENDOSCOPY | Age: 82
End: 2022-09-26
Payer: MEDICARE

## 2022-09-26 ENCOUNTER — ANESTHESIA EVENT (OUTPATIENT)
Dept: ENDOSCOPY | Age: 82
End: 2022-09-26
Payer: MEDICARE

## 2022-09-26 VITALS
HEART RATE: 73 BPM | OXYGEN SATURATION: 97 % | HEIGHT: 67 IN | DIASTOLIC BLOOD PRESSURE: 61 MMHG | SYSTOLIC BLOOD PRESSURE: 129 MMHG | WEIGHT: 181 LBS | RESPIRATION RATE: 16 BRPM | BODY MASS INDEX: 28.41 KG/M2 | TEMPERATURE: 98 F

## 2022-09-26 PROCEDURE — 77030018712 HC DEV BLLN INFL BSC -B: Performed by: INTERNAL MEDICINE

## 2022-09-26 PROCEDURE — C1726 CATH, BAL DIL, NON-VASCULAR: HCPCS | Performed by: INTERNAL MEDICINE

## 2022-09-26 PROCEDURE — 74011000250 HC RX REV CODE- 250: Performed by: ANESTHESIOLOGY

## 2022-09-26 PROCEDURE — 74011250636 HC RX REV CODE- 250/636: Performed by: ANESTHESIOLOGY

## 2022-09-26 PROCEDURE — 88305 TISSUE EXAM BY PATHOLOGIST: CPT

## 2022-09-26 PROCEDURE — 76060000031 HC ANESTHESIA FIRST 0.5 HR: Performed by: INTERNAL MEDICINE

## 2022-09-26 PROCEDURE — 2709999900 HC NON-CHARGEABLE SUPPLY: Performed by: INTERNAL MEDICINE

## 2022-09-26 PROCEDURE — 77030019988 HC FCPS ENDOSC DISP BSC -B: Performed by: INTERNAL MEDICINE

## 2022-09-26 PROCEDURE — 74011250636 HC RX REV CODE- 250/636: Performed by: INTERNAL MEDICINE

## 2022-09-26 PROCEDURE — 76040000019: Performed by: INTERNAL MEDICINE

## 2022-09-26 RX ORDER — NALOXONE HYDROCHLORIDE 0.4 MG/ML
0.4 INJECTION, SOLUTION INTRAMUSCULAR; INTRAVENOUS; SUBCUTANEOUS
Status: DISCONTINUED | OUTPATIENT
Start: 2022-09-26 | End: 2022-09-26 | Stop reason: HOSPADM

## 2022-09-26 RX ORDER — DEXTROMETHORPHAN/PSEUDOEPHED 2.5-7.5/.8
1.2 DROPS ORAL
Status: DISCONTINUED | OUTPATIENT
Start: 2022-09-26 | End: 2022-09-26 | Stop reason: HOSPADM

## 2022-09-26 RX ORDER — LIDOCAINE HYDROCHLORIDE 20 MG/ML
INJECTION, SOLUTION EPIDURAL; INFILTRATION; INTRACAUDAL; PERINEURAL AS NEEDED
Status: DISCONTINUED | OUTPATIENT
Start: 2022-09-26 | End: 2022-09-26 | Stop reason: HOSPADM

## 2022-09-26 RX ORDER — ATROPINE SULFATE 0.1 MG/ML
0.5 INJECTION INTRAVENOUS
Status: DISCONTINUED | OUTPATIENT
Start: 2022-09-26 | End: 2022-09-26 | Stop reason: HOSPADM

## 2022-09-26 RX ORDER — SODIUM CHLORIDE 0.9 % (FLUSH) 0.9 %
5-40 SYRINGE (ML) INJECTION AS NEEDED
Status: DISCONTINUED | OUTPATIENT
Start: 2022-09-26 | End: 2022-09-26 | Stop reason: HOSPADM

## 2022-09-26 RX ORDER — FLUMAZENIL 0.1 MG/ML
0.2 INJECTION INTRAVENOUS
Status: DISCONTINUED | OUTPATIENT
Start: 2022-09-26 | End: 2022-09-26 | Stop reason: HOSPADM

## 2022-09-26 RX ORDER — PROPOFOL 10 MG/ML
INJECTION, EMULSION INTRAVENOUS AS NEEDED
Status: DISCONTINUED | OUTPATIENT
Start: 2022-09-26 | End: 2022-09-26 | Stop reason: HOSPADM

## 2022-09-26 RX ORDER — MIDAZOLAM HYDROCHLORIDE 1 MG/ML
.25-5 INJECTION, SOLUTION INTRAMUSCULAR; INTRAVENOUS
Status: DISCONTINUED | OUTPATIENT
Start: 2022-09-26 | End: 2022-09-26 | Stop reason: HOSPADM

## 2022-09-26 RX ORDER — EPINEPHRINE 0.1 MG/ML
1 INJECTION INTRACARDIAC; INTRAVENOUS
Status: DISCONTINUED | OUTPATIENT
Start: 2022-09-26 | End: 2022-09-26 | Stop reason: HOSPADM

## 2022-09-26 RX ORDER — FAMOTIDINE 10 MG/1
10 TABLET ORAL 2 TIMES DAILY
COMMUNITY

## 2022-09-26 RX ORDER — SODIUM CHLORIDE 9 MG/ML
75 INJECTION, SOLUTION INTRAVENOUS CONTINUOUS
Status: DISCONTINUED | OUTPATIENT
Start: 2022-09-26 | End: 2022-09-26 | Stop reason: HOSPADM

## 2022-09-26 RX ORDER — SODIUM CHLORIDE 0.9 % (FLUSH) 0.9 %
5-40 SYRINGE (ML) INJECTION EVERY 8 HOURS
Status: DISCONTINUED | OUTPATIENT
Start: 2022-09-26 | End: 2022-09-26 | Stop reason: HOSPADM

## 2022-09-26 RX ADMIN — PROPOFOL 200 MG: 10 INJECTION, EMULSION INTRAVENOUS at 11:05

## 2022-09-26 RX ADMIN — LIDOCAINE HYDROCHLORIDE 100 MG: 20 INJECTION, SOLUTION EPIDURAL; INFILTRATION; INTRACAUDAL; PERINEURAL at 10:54

## 2022-09-26 RX ADMIN — SODIUM CHLORIDE 75 ML/HR: 9 INJECTION, SOLUTION INTRAVENOUS at 10:35

## 2022-09-26 NOTE — PERIOP NOTES
Patient with need to clear throat over and over during preop. She denies any colored secretions, fever and or chills. She says she is not bringing up any phlegm but was \"up coughing all night. \"    Dr. Dalton Marie and Dr. Heredia Serve aware of above. Dr. Heredia Serve spoke with patient at length regarding secretions and airway.

## 2022-09-26 NOTE — PERIOP NOTES
CRE balloon dilatation of the esophagus   18 mm Balloon inflated to 3 ATMs and held for 60 seconds. 19 mm Balloon inflated to 4.5 ATMs and held for 60 seconds. No subcutaneous crepitus of the chest or cervical region was noted post dilatation.

## 2022-09-26 NOTE — ROUTINE PROCESS
Drew Paz  1940  [id number]    Situation:  Verbal report received from: Taiwo Regalado RN  Procedure: Procedure(s):  ESOPHAGOGASTRODUODENOSCOPY (EGD)  ESOPHAGOGASTRODUODENAL (EGD) BIOPSY  ESOPHAGEAL DILATION    Background:    Preoperative diagnosis: GERD, DYSPHAGIA, IRRITABLE BOWEL SYNDROME  Postoperative diagnosis: Small Hiatal Hernia, Gastritis    :  Dr. Nati Mejia  Assistant(s): Endoscopy Technician-1: Eliana Sauceda  Endoscopy RN-1: Jm Tobar RN    Specimens:   ID Type Source Tests Collected by Time Destination   1 : Gastric bx Preservative Gastric  Em Bartlett MD 9/26/2022 1058 Pathology     H. Pylori  no    Assessment:  Intra-procedure medications     Anesthesia gave intra-procedure sedation and medications, see anesthesia flow sheet yes    Intravenous fluids: NS@ KVO     Vital signs stable   yes    Abdominal assessment: round and soft   yes    Recommendation:  Discharge patient per MD order   yes.     Family or Ana Bronwyn, daughter  Permission to share finding with family or friend yes

## 2022-09-26 NOTE — PROCEDURES
[place] Office: [phone]      Esophagogastroduodenoscopy Procedure Note      Barbara Fletcher  1940  [id number]    Indication:  GERD, Dyspahgia, Eructations      : Lo Preston MD    Referring Provider:  Desean Harrell NP    Sedation:  MAC anesthesia Propofol    Procedure Details:  After detailed informed consent was obtained for the procedure, with all risks and benefits of procedure explained the patient was taken to the endoscopy suite and placed in the left lateral decubitus position. Following sequential administration of sedation as per above, the endoscope was inserted into the mouth and advanced under direct vision to second portion of the duodenum. A careful inspection was made as the gastroscope was withdrawn, including a retroflexed view of the proximal stomach; findings and interventions are described below. Findings:     Esophagus: The esophageal mucosa in the proximal, mid and distal esophagus is normal.   There is increased esophageal spasm. The squamo-columnar junction is at 40 cm where the Z-line was noted. A small 1 cm sliding type hiatal hernia is noted. TTS CRE 18-19 mm balloon dilation performed, each station over 1 minute    Stomach: The gastric mucosa has erythema in the body and antrum: biopsies caused prolonged oozing(self limited though). The fundus was difficult to see in retroflexion but no large mass seen. The angularis is normal.    Duodenum:   The bulb and post bulbar mucosa is normal in appearance. The duodenal folds are normal.     Therapies:  esophageal dilation with 18-19 mm sized balloon  biopsy of stomach body, antrum    Specimen:  Specimens were collected as described and send to the laboratory. Complications:   None were encountered during the procedure. EBL:  None. Recommendations:     -Continue acid suppression. ,   -Await pathology. ,   -Follow symptoms. ,   -GERD diet: avoid fried and fatty foods.  peppermint, chocolate, alcohol, coffee, citrus fruits and juices, tomoato products; avoid lying down for 2 to 3 hours after eating.,   -Follow up with primary care physician      Thank you for entrusting me with this patient's care. Please do not hesitate to contact me with any questions or if I can be of assistance with any of your other patients' GI needs. Madhu Sanchez MD  9/26/2022  11:02 AM

## 2022-09-26 NOTE — PERIOP NOTES
Endoscopy Case End Note:    1059:  Procedure scope was pre-cleaned, per protocol, at bedside by Leo Mars.      1106:  Report received from anesthesia - Dr. Mone Butterfield DO. See anesthesia flowsheet for intra-procedure vital signs and events. 1108:  Glasses returned to patient.

## 2022-09-26 NOTE — DISCHARGE INSTRUCTIONS
[place] Office: [phone]    Shilpa Koroma  [id number]  1940    EGD/COLONOSCOPY DISCHARGE INSTRUCTIONS  Discomfort:  Sore throat- throat lozenges or warm salt water gargle  redness at IV site- apply warm compress to area; if redness or soreness persist- contact your physician  Gaseous discomfort- walking, belching will help relieve any discomfort  You may not operate a vehicle for 12 hours  You may not engage in an occupation involving machinery or appliances for rest of today. You may not drink alcoholic beverages for at least 12 hours  Avoid making any critical decisions for at least 24 hour  DIET  You may resume your regular diet - however -  remember your colon is empty and a heavy meal will produce gas. Avoid these foods:  fried / greasy foods, excessive carbonated drinks or too much caffeine  MEDICATIONS   Regarding Aspirin or Nonsteroidal medications specifically, please see below. ACTIVITY  You may resume your normal daily activities. Spend the remainder of the day resting -  avoid any strenuous activity. CALL M.D. ANY SIGN OF   Increasing pain, nausea, vomiting  Abdominal distension (swelling)  New increased bleeding (oral or rectal)  Fever (chills)  Pain in chest area  Bloody discharge from nose or mouth  Shortness of breath    You may not take any Advil, Aspirin, Ibuprofen, Motrin, Aleve, or Goodys for 7 days, ONLY  Tylenol as needed for pain. Follow-up Instructions:   Call  Glenn Bee MD for any questions or concerns  Results of procedure / biopsy in 7 days   Telephone # [phone]      Patient Education on Sedation / Analgesia Administered for Procedure      For 24 hours after general anesthesia or intravenous analgesia / sedation:  Have someone responsible help you with your care  Limit your activities  Do not drive and operate hazardous machinery  Do not make important personal, legal or business decisions  Do not drink alcoholic beverages  If you have not urinated within 8 hours after discharge, please contact your physician  Resume your medications unless otherwise instructed    For 24 hours after general anesthesia or intravenous analgesia / sedation  you may experience:  Drowsiness, dizziness, sleepiness, or confusion  Difficulty remembering or delayed reaction times  Difficulty with your balance, especially while walking, move slowly and carefully, do not make sudden position changes  Difficulty focusing or blurred vision    You may not be aware of slight changes in your behavior and/or your reaction time because of the medication used during and after your procedure.     Report the following to your physician:  Excessive pain, swelling, redness or odor of or around the surgical area  Temperature over 100.5  Nausea and vomiting lasting longer than 4 hours or if unable to take medications  Any signs of decreased circulation or nerve impairment to extremity: change in color, persistent numbness, tingling, coldness or increase pain  Any questions or concerns    IF YOU REPORT TO AN EMERGENCY ROOM, DOCTOR'S OFFICE OR HOSPITAL WITHIN 24 HOURS AFTER YOUR PROCEDURE, BRING THIS SHEET AND YOUR AFTER VISIT SUMMARY WITH YOU AND GIVE IT TO THE PHYSICIAN OR NURSE ATTENDING YOU.

## 2022-09-26 NOTE — H&P
Pre-endoscopy H and P    The patient was seen and examined in the room/pre-op holding area. The airway was assessed and documented. The problem list, past medical history, and medications were reviewed.      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    Balance disorder R26.89    Falls frequently R29.6    Small vessel disease, cerebrovascular I67.9     Social History     Socioeconomic History    Marital status:      Spouse name: Not on file    Number of children: Not on file    Years of education: Not on file    Highest education level: Not on file   Occupational History    Not on file   Tobacco Use    Smoking status: Never    Smokeless tobacco: Never   Substance and Sexual Activity    Alcohol use: No    Drug use: No    Sexual activity: Not on file   Other Topics Concern    Not on file   Social History Narrative    Not on file     Social Determinants of Health     Financial Resource Strain: Not on file   Food Insecurity: Not on file   Transportation Needs: Not on file   Physical Activity: Not on file   Stress: Not on file   Social Connections: Not on file   Intimate Partner Violence: Not on file   Housing Stability: Not on file     Past Medical History:   Diagnosis Date    Anxiety     Arthritis     Cancer ([facility] [address]     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 [address] 4/28/2019    [facility] 2007 2 \"mini-byers\" -as of 2/1/5/17 pt denies any residual effect    Unspecified adverse effect of anesthesia     sister-almost dies after surgeries. pt has never head of malignant hyperthermia and pt has never had difficulty during anesthesia         Prior to Admission Medications   Prescriptions Last Dose Informant Patient Reported? Taking? ALPRAZolam (XANAX) 0.5 mg tablet 9/25/2022 Self Yes Yes   Sig: Take 0.5 mg by mouth two (2) times a day. CALCIUM CARBONATE/VITAMIN D3 (CALTRATE-600 PLUS VITAMIN D3 PO) 9/24/2022 Self Yes Yes   Sig: Take 1 Tab by mouth every morning. FLUoxetine (PROzac) 20 mg capsule 9/24/2022 Self Yes Yes   Sig: Take 20 mg by mouth every morning. aspirin 81 mg tablet 9/24/2022 Self Yes Yes   Sig: Take 81 mg by mouth every morning. clemastine 2.68 mg tab tablet 9/24/2022 Self Yes Yes   Sig: Take 1.34 mg by mouth daily as needed (Allergies). cyanocobalamin (VITAMIN B12) 100 mcg tablet 9/24/2022  Yes Yes   Sig: Take 100 mcg by mouth daily. ergocalciferol (ERGOCALCIFEROL) 1,250 mcg (50,000 unit) capsule 9/19/2022  Yes Yes   Sig: Take 50,000 Units by mouth.   meclizine (ANTIVERT) 25 mg tablet Unknown Self Yes No   Sig: Take 25 mg by mouth three (3) times daily as needed for Dizziness (vertigo). Indications: sensation of spinning or whirling   polysorbate 80/glycerin (REFRESH DRY EYE THERAPY OP) 9/26/2022 Self Yes Yes   Sig: Apply 2 Drops to eye daily. vit C/E/Zn/coppr/lutein/zeaxan (PRESERVISION AREDS 2 PO) 9/24/2022 Self Yes Yes   Sig: Take 40 mg by mouth daily. Indications: eye lubrication      Facility-Administered Medications: None           The review of systems is:  Negative  for shortness of breath or chest pain      The heart, lungs, and mental status were satisfactory for the administration of deep sedation and for the procedure.       I discussed with the patient the objectives, risks, consequences and alternatives to the procedure.       Cedric Zuleta MD  9/26/2022  10:41 AM

## 2022-09-26 NOTE — ANESTHESIA PREPROCEDURE EVALUATION
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)     Other Findings   Comments:  IBS (irritable bowel syndrome)  Rheumatoid arthritis  Balance disorder           Physical Exam    Airway  Mallampati: II  TM Distance: 4 - 6 cm  Neck ROM: normal range of motion   Mouth opening: Normal     Cardiovascular  Regular rate and rhythm,  S1 and S2 normal,  no murmur, click, rub, or gallop  Rhythm: regular  Rate: normal         Dental    Dentition: Upper dentition intact and Lower dentition intact     Pulmonary  Breath sounds clear to auscultation               Abdominal  GI exam deferred       Other Findings            Anesthetic Plan    ASA: 2  Anesthesia type: total IV anesthesia and general    Monitoring Plan: BIS      Induction: Intravenous  Anesthetic plan and risks discussed with: Patient      Propofol MAC

## 2022-09-26 NOTE — ANESTHESIA POSTPROCEDURE EVALUATION
Procedure(s):  ESOPHAGOGASTRODUODENOSCOPY (EGD)  ESOPHAGOGASTRODUODENAL (EGD) BIOPSY  ESOPHAGEAL DILATION. total IV anesthesia    Anesthesia Post Evaluation        Patient location during evaluation: PACU  Note status: Adequate. Level of consciousness: responsive to verbal stimuli and sleepy but conscious  Pain management: satisfactory to patient  Airway patency: patent  Anesthetic complications: no  Cardiovascular status: acceptable  Respiratory status: acceptable  Hydration status: acceptable  Comments: +Post-Anesthesia Evaluation and Assessment    Patient: Yehuda Medrano MRN: [id number]  SSN: [id number]   YOB: 1940  Age: 80 y.o. Sex: female      Cardiovascular Function/Vital Signs    /61   Pulse 73   Temp 36.7 °C (98 °F)   Resp 16   Ht 5' 7\" (1.702 m)   Wt 82.1 kg (181 lb)   SpO2 97%   Breastfeeding No   BMI 28.35 kg/m²     Patient is status post Procedure(s):  ESOPHAGOGASTRODUODENOSCOPY (EGD)  ESOPHAGOGASTRODUODENAL (EGD) BIOPSY  ESOPHAGEAL DILATION. Nausea/Vomiting: Controlled. Postoperative hydration reviewed and adequate. Pain:  Pain Scale 1: Numeric (0 - 10) (09/26/22 1133)  Pain Intensity 1: 0 (09/26/22 1133)   Managed. Neurological Status: At baseline. Mental Status and Level of Consciousness: Arousable. Pulmonary Status:   O2 Device: None (Room air) (09/26/22 1133)   Adequate oxygenation and airway patent. Complications related to anesthesia: None    Post-anesthesia assessment completed. No concerns. Signed By: Carroll Hester MD    9/26/2022  Post anesthesia nausea and vomiting:  controlled      INITIAL Post-op Vital signs:   Vitals Value Taken Time   /61 09/26/22 1133   Temp 36.7 °C (98 °F) 09/26/22 1115   Pulse 73 09/26/22 1134   Resp 20 09/26/22 1134   SpO2 98 % 09/26/22 1134   Vitals shown include unvalidated device data.

## 2023-02-10 ENCOUNTER — HOSPITAL ENCOUNTER (OUTPATIENT)
Age: 83
Setting detail: OUTPATIENT SURGERY
Discharge: HOME | End: 2023-02-10
Attending: INTERNAL MEDICINE | Admitting: INTERNAL MEDICINE
Payer: MEDICARE

## 2023-02-10 ENCOUNTER — ANESTHESIA EVENT (OUTPATIENT)
Dept: ENDOSCOPY | Age: 83
End: 2023-02-10
Payer: MEDICARE

## 2023-02-10 ENCOUNTER — ANESTHESIA (OUTPATIENT)
Dept: ENDOSCOPY | Age: 83
End: 2023-02-10
Payer: MEDICARE

## 2023-02-10 VITALS
HEART RATE: 74 BPM | WEIGHT: 182 LBS | BODY MASS INDEX: 28.56 KG/M2 | HEIGHT: 67 IN | TEMPERATURE: 97.9 F | OXYGEN SATURATION: 99 % | DIASTOLIC BLOOD PRESSURE: 54 MMHG | SYSTOLIC BLOOD PRESSURE: 122 MMHG | RESPIRATION RATE: 13 BRPM

## 2023-02-10 PROCEDURE — 76040000019: Performed by: INTERNAL MEDICINE

## 2023-02-10 PROCEDURE — 2709999900 HC NON-CHARGEABLE SUPPLY: Performed by: INTERNAL MEDICINE

## 2023-02-10 PROCEDURE — 74011250636 HC RX REV CODE- 250/636: Performed by: NURSE PRACTITIONER

## 2023-02-10 PROCEDURE — 74011000250 HC RX REV CODE- 250: Performed by: NURSE PRACTITIONER

## 2023-02-10 PROCEDURE — 76060000031 HC ANESTHESIA FIRST 0.5 HR: Performed by: INTERNAL MEDICINE

## 2023-02-10 RX ORDER — SODIUM CHLORIDE, SODIUM LACTATE, POTASSIUM CHLORIDE, CALCIUM CHLORIDE 600; 310; 30; 20 MG/100ML; MG/100ML; MG/100ML; MG/100ML
INJECTION, SOLUTION INTRAVENOUS
Status: DISCONTINUED | OUTPATIENT
Start: 2023-02-10 | End: 2023-02-10 | Stop reason: HOSPADM

## 2023-02-10 RX ORDER — NALOXONE HYDROCHLORIDE 0.4 MG/ML
0.4 INJECTION, SOLUTION INTRAMUSCULAR; INTRAVENOUS; SUBCUTANEOUS
Status: DISCONTINUED | OUTPATIENT
Start: 2023-02-10 | End: 2023-02-10 | Stop reason: HOSPADM

## 2023-02-10 RX ORDER — DEXTROMETHORPHAN/PSEUDOEPHED 2.5-7.5/.8
1.2 DROPS ORAL
Status: DISCONTINUED | OUTPATIENT
Start: 2023-02-10 | End: 2023-02-10 | Stop reason: HOSPADM

## 2023-02-10 RX ORDER — PROPOFOL 10 MG/ML
INJECTION, EMULSION INTRAVENOUS AS NEEDED
Status: DISCONTINUED | OUTPATIENT
Start: 2023-02-10 | End: 2023-02-10 | Stop reason: HOSPADM

## 2023-02-10 RX ORDER — ATROPINE SULFATE 0.1 MG/ML
0.5 INJECTION INTRAVENOUS
Status: DISCONTINUED | OUTPATIENT
Start: 2023-02-10 | End: 2023-02-10 | Stop reason: HOSPADM

## 2023-02-10 RX ORDER — MIDAZOLAM HYDROCHLORIDE 1 MG/ML
.25-1 INJECTION, SOLUTION INTRAMUSCULAR; INTRAVENOUS
Status: DISCONTINUED | OUTPATIENT
Start: 2023-02-10 | End: 2023-02-10 | Stop reason: HOSPADM

## 2023-02-10 RX ORDER — SODIUM CHLORIDE 0.9 % (FLUSH) 0.9 %
5-40 SYRINGE (ML) INJECTION EVERY 8 HOURS
Status: DISCONTINUED | OUTPATIENT
Start: 2023-02-10 | End: 2023-02-10 | Stop reason: HOSPADM

## 2023-02-10 RX ORDER — LIDOCAINE HYDROCHLORIDE 20 MG/ML
INJECTION, SOLUTION EPIDURAL; INFILTRATION; INTRACAUDAL; PERINEURAL AS NEEDED
Status: DISCONTINUED | OUTPATIENT
Start: 2023-02-10 | End: 2023-02-10

## 2023-02-10 RX ORDER — SODIUM CHLORIDE 0.9 % (FLUSH) 0.9 %
5-40 SYRINGE (ML) INJECTION AS NEEDED
Status: DISCONTINUED | OUTPATIENT
Start: 2023-02-10 | End: 2023-02-10 | Stop reason: HOSPADM

## 2023-02-10 RX ORDER — EPINEPHRINE 0.1 MG/ML
1 INJECTION INTRACARDIAC; INTRAVENOUS
Status: DISCONTINUED | OUTPATIENT
Start: 2023-02-10 | End: 2023-02-10 | Stop reason: HOSPADM

## 2023-02-10 RX ORDER — FLUMAZENIL 0.1 MG/ML
0.2 INJECTION INTRAVENOUS
Status: DISCONTINUED | OUTPATIENT
Start: 2023-02-10 | End: 2023-02-10 | Stop reason: HOSPADM

## 2023-02-10 RX ORDER — SODIUM CHLORIDE 9 MG/ML
50 INJECTION, SOLUTION INTRAVENOUS CONTINUOUS
Status: DISCONTINUED | OUTPATIENT
Start: 2023-02-10 | End: 2023-02-10 | Stop reason: HOSPADM

## 2023-02-10 RX ORDER — FENTANYL CITRATE 50 UG/ML
100 INJECTION, SOLUTION INTRAMUSCULAR; INTRAVENOUS
Status: DISCONTINUED | OUTPATIENT
Start: 2023-02-10 | End: 2023-02-10 | Stop reason: HOSPADM

## 2023-02-10 RX ORDER — LIDOCAINE HYDROCHLORIDE 20 MG/ML
INJECTION, SOLUTION EPIDURAL; INFILTRATION; INTRACAUDAL; PERINEURAL AS NEEDED
Status: DISCONTINUED | OUTPATIENT
Start: 2023-02-10 | End: 2023-02-10 | Stop reason: HOSPADM

## 2023-02-10 RX ADMIN — PROPOFOL 30 MG: 10 INJECTION, EMULSION INTRAVENOUS at 14:02

## 2023-02-10 RX ADMIN — PROPOFOL 80 MG: 10 INJECTION, EMULSION INTRAVENOUS at 13:57

## 2023-02-10 RX ADMIN — PROPOFOL 30 MG: 10 INJECTION, EMULSION INTRAVENOUS at 14:04

## 2023-02-10 RX ADMIN — PROPOFOL 30 MG: 10 INJECTION, EMULSION INTRAVENOUS at 14:06

## 2023-02-10 RX ADMIN — LIDOCAINE HYDROCHLORIDE 40 MG: 20 INJECTION, SOLUTION EPIDURAL; INFILTRATION; INTRACAUDAL; PERINEURAL at 13:57

## 2023-02-10 RX ADMIN — SODIUM CHLORIDE, SODIUM LACTATE, POTASSIUM CHLORIDE, AND CALCIUM CHLORIDE: 600; 310; 30; 20 INJECTION, SOLUTION INTRAVENOUS at 13:53

## 2023-02-10 RX ADMIN — PROPOFOL 30 MG: 10 INJECTION, EMULSION INTRAVENOUS at 14:08

## 2023-02-10 RX ADMIN — PROPOFOL 30 MG: 10 INJECTION, EMULSION INTRAVENOUS at 14:10

## 2023-02-10 RX ADMIN — PROPOFOL 30 MG: 10 INJECTION, EMULSION INTRAVENOUS at 14:00

## 2023-02-10 NOTE — DISCHARGE INSTRUCTIONS
[facility]  [address] [facility], [address] [place]  [id number]  1940    It was my pleasure seeing you for your procedure. You will also receive a summary report with the findings from this procedure and any further recommendations. If you had polyps removed or biopsies taken during your procedure, you will receive a separate letter from me within the next 2 weeks. If you don't receive this letter or if you have any questions, please call my office [phone]. Please take note of the post procedure instructions listed below. Sheldon Virgenes,    Dr. Katherine Maloney    These instructions give you information on caring for yourself after your procedure. Call your doctor if you have any problems or questions after your procedure. HOME CARE  Walk if you have belly cramping or gas. Walking will help get rid of the air and reduce the bloated feeling in your belly (abdomen). Your IV site (where you received drugs) may be tender to touch. Place warm towels on the site; keep your arm up on two pillows if you have any swelling or soreness in the area. You may shower. ACTIVITY:  Take frequent rest periods and move at a slower pace for the next 24 hours. .  You may resume your regular activity tomorrow if you are feeling back to normal.  Do not drive or ride a bicycle for at least 24 hours (because of the medicine (anesthesia) used during the test). Do not sign any important legal documents or use or operate any machinery for 24 hours  Do not take sleeping medicines/nerve drugs for 24 hours unless the doctor tells you. You can return to work/school tomorrow unless otherwise instructed. NUTRITION:  Drink plenty of fluids to keep your pee (urine) clear or pale yellow  Begin with a light meal and progress to your normal diet.  Heavy or fried foods are harder to digest and may make you feel sick to your stomach (nauseated). Once you are feeling back to normal, you may resume your normal diet as instructed by your doctor. Avoid alcoholic beverages for 24 hours or as instructed. IF YOU HAD BIOPSIES TAKEN OR POLYPS REMOVED DURING THE PROCEDURE:  For the next 7 days, avoid all non-steroidal antiinflammatory medications such as Ibuprofen, Motrin, Advil, Alleve, Maritza-seltzer, Goody's powder, BC powder. If you do not have an heart condition that requires you to take a daily aspirin, you should avoid taking aspirin for 7 days. Eat a soft diet for 24 hours. Monitor your stools for any blood or dark black, tar-like, stools as this may be a sign of bleeding and if you see any blood, notify your doctor immediately. GET HELP RIGHT AWAY AND SEEK IMMEDIATE MEDICAL CARE IF:  You have more than a spotting of blood in your stool. You pass clumps of tissue (blood clots) or fill the toilet with blood. Your belly is painfully swollen or puffy (abdominal distention). You throw up (vomit). You have a fever. You have redness, pain or swelling at the IV site that last greater than two days. You have abdominal pain or discomfort that is severe or gets worse throughout the day. Post-procedure diagnosis:  hemorrhoids    Post-procedure recommendations:    Findings:   Rectum: small internal and external hemorrhoids   Sigmoid: normal  Descending Colon: normal  Transverse Colon: normal  Ascending Colon: normal  Cecum: normal  Terminal Ileum: normal    Recommendations:   - Resume normal medications.  - Noting age, no further colonoscopies recommended solely for screening purposes. Hemorrhoids: Care Instructions  Overview     Hemorrhoids are swollen veins that develop in the anal canal. Bleeding during bowel movements, itching, and rectal pain are the most common symptoms. Hemorrhoids can be uncomfortable at times, but rarely are they a serious problem.   Most of the time, you can treat them with simple changes to your diet and bowel habits. These changes include eating more fiber and not straining to pass stools. Most hemorrhoids don't need surgery or other treatment unless they are very large and painful or bleed a lot. Follow-up care is a key part of your treatment and safety. Be sure to make and go to all appointments, and call your doctor if you are having problems. It's also a good idea to know your test results and keep a list of the medicines you take. How can you care for yourself at home? Sit in a few inches of warm water (sitz bath) 3 times a day and after bowel movements. The warm water helps with pain and itching. Put ice on your anal area several times a day for 10 minutes at a time. Put a thin cloth between the ice and your skin. Follow this by placing a warm, wet towel on the area for another 10 to 20 minutes. Take pain medicines exactly as directed. If the doctor gave you a prescription medicine for pain, take it as prescribed. If you are not taking a prescription pain medicine, ask your doctor if you can take an over-the-counter medicine. Keep the anal area clean, but be gentle. Use water and a fragrance-free soap, or use baby wipes or medicated pads such as Tucks. Wear cotton underwear and loose clothing to decrease moisture in the anal area. Eat more fiber. Include foods such as whole-grain breads and cereals, raw vegetables, raw and dried fruits, and beans. Drink plenty of fluids. If you have kidney, heart, or liver disease and have to limit fluids, talk with your doctor before you increase the amount of fluids you drink. Use a stool softener that contains bran or psyllium. You can save money by buying bran or psyllium (available in bulk at most health food stores) and sprinkling it on foods or stirring it into fruit juice. Or you can use a product such as Metamucil or Hydrocil. Practice healthy bowel habits. Go to the bathroom as soon as you have the urge.   Avoid straining to pass stools. Relax and give yourself time to let things happen naturally. Do not hold your breath while passing stools. Do not read while sitting on the toilet. Get off the toilet as soon as you have finished. Take your medicines exactly as prescribed. Call your doctor if you think you are having a problem with your medicine. When should you call for help? Call 911 anytime you think you may need emergency care. For example, call if:    You pass maroon or very bloody stools. Call your doctor now or seek immediate medical care if:    You have increased pain. You have increased bleeding. Watch closely for changes in your health, and be sure to contact your doctor if:    Your symptoms have not improved after 3 or 4 days. Where can you learn more? Go to http://www.aguilar.com/  Enter F228 in the search box to learn more about \"Hemorrhoids: Care Instructions. \"  Current as of: June 6, 2022               Content Version: 13.4  © 2006-2022 [employer]. Care instructions adapted under license by [employer] (which disclaims liability or warranty for this information). If you have questions about a medical condition or this instruction, always ask your healthcare professional. [place] [address] any warranty or liability for your use of this information. Learning About Coronavirus [phone])  Coronavirus [phone]): Overview  What is coronavirus (COVID-19)? The coronavirus disease (COVID-19) is caused by a virus. It is an illness that was first found in [place], [place], in December 2019. It has since spread worldwide. The virus can cause fever, cough, and trouble breathing. In severe cases, it can cause pneumonia and make it hard to breathe without help. It can cause death. Coronaviruses are a large group of viruses. They cause the common cold.  They also cause more serious illnesses like Middle East respiratory syndrome (MERS) and severe acute respiratory syndrome (SARS). COVID-19 is caused by a novel coronavirus. That means it's a new type that has not been seen in people before. This virus spreads person-to-person through droplets from coughing and sneezing. It can also spread when you are close to someone who is infected. And it can spread when you touch something that has the virus on it, such as a doorknob or a tabletop. What can you do to protect yourself from coronavirus (COVID-19)? The best way to protect yourself from getting sick is to: Avoid areas where there is an outbreak. Avoid contact with people who may be infected. Wash your hands often with soap or alcohol-based hand sanitizers. Avoid crowds and try to stay at least 6 feet away from other people. Wash your hands often, especially after you cough or sneeze. Use soap and water, and scrub for at least 20 seconds. If soap and water aren't available, use an alcohol-based hand . Avoid touching your mouth, nose, and eyes. What can you do to avoid spreading the virus to others? To help avoid spreading the virus to others:  Cover your mouth with a tissue when you cough or sneeze. Then throw the tissue in the trash. Use a disinfectant to clean things that you touch often. Stay home if you are sick or have been exposed to the virus. Don't go to school, work, or public areas. And don't use public transportation. If you are sick:  Leave your home only if you need to get medical care. But call the doctor's office first so they know you're coming. And wear a face mask, if you have one. If you have a face mask, wear it whenever you're around other people. It can help stop the spread of the virus when you cough or sneeze. Clean and disinfect your home every day. Use household  and disinfectant wipes or sprays. Take special care to clean things that you grab with your hands. These include doorknobs, remote controls, phones, and handles on your refrigerator and microwave. And don't forget countertops, tabletops, bathrooms, and computer keyboards. When to call for help  Call 911 anytime you think you may need emergency care. For example, call if:  You have severe trouble breathing. (You can't talk at all.)  You have constant chest pain or pressure. You are severely dizzy or lightheaded. You are confused or can't think clearly. Your face and lips have a blue color. You pass out (lose consciousness) or are very hard to wake up. Call your doctor now if you develop symptoms such as:  Shortness of breath. Fever. Cough. If you need to get care, call ahead to the doctor's office for instructions before you go. Make sure you wear a face mask, if you have one, to prevent exposing other people to the virus. Where can you get the latest information? The following health organizations are tracking and studying this virus. Their websites contain the most up-to-date information. [place] All also learn what to do if you think you may have been exposed to the virus. U.S. Centers for Disease Control and Prevention (CDC): The CDC provides updated news about the disease and travel advice. The website also tells you how to prevent the spread of infection. www.cdc.gov  World Health Organization [facility]): WHO offers information about the virus outbreaks. WHO also has travel advice. www.who.int  Current as of: April 1, 2020               Content Version: 12.4  © 7675-0473 Healthwise, Incorporated. Care instructions adapted under license by your healthcare professional. If you have questions about a medical condition or this instruction, always ask your healthcare professional. [address] any warranty or liability for your use of this information.

## 2023-02-10 NOTE — ANESTHESIA PREPROCEDURE EVALUATION
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)     Other Findings   Comments:  IBS (irritable bowel syndrome)  Rheumatoid arthritis  Balance disorder           Physical Exam    Airway  Mallampati: II  TM Distance: 4 - 6 cm  Neck ROM: normal range of motion   Mouth opening: Normal     Cardiovascular  Regular rate and rhythm,  S1 and S2 normal,  no murmur, click, rub, or gallop  Rhythm: regular  Rate: normal         Dental    Dentition: Upper dentition intact and Lower dentition intact     Pulmonary  Breath sounds clear to auscultation               Abdominal  GI exam deferred       Other Findings            Anesthetic Plan    ASA: 3  Anesthesia type: MAC          Induction: Intravenous  Anesthetic plan and risks discussed with: Patient      Propofol MAC

## 2023-02-10 NOTE — PERIOP NOTES

## 2023-02-10 NOTE — H&P
118 [facility].  [address] [place] [address] [address], [address]  [phone]                                History and Physical     NAME: Codie Real   :  1940   MRN:  [id number]     HPI:  The patient was seen and examined. Past Surgical History:   Procedure Laterality Date    COLONOSCOPY N/A 2016    COLONOSCOPY performed by Joselito Wilkinson MD at [facility] ENDOSCOPY    HX BREAST BIOPSY Bilateral     benign    HX HYSTERECTOMY      HX LAP CHOLECYSTECTOMY      HX SALPINGO-OOPHORECTOMY      \"left one ovary\"    HX SHOULDER ARTHROSCOPY Left     HX TONSILLECTOMY      HX TUBAL LIGATION      SD COLONOSCOPY FLX DX W/COLLJ SPEC WHEN PFRMD  2011          Past Medical History:   Diagnosis Date    Anxiety     Arthritis     Cancer ([facility] [address]     BCC & SCC of face & leg    Chronic kidney disease     Stage 3 [facility]    Dysphagia 2014    pt denies any issues with dysphagia (food) at this time 2/15/17    GERD (gastroesophageal reflux disease)     IBS (irritable bowel syndrome)     Nausea & vomiting     Neuropathy      idiopathic axonal neuropathy Dr Wilian Aburto in [place]     Pneumonia age 8    PUD (peptic ulcer disease)     SBO (small bowel obstruction) ([address] 2019    Stroke ([facility] [address] 2007 \"mini-byers\" -as of  pt denies any residual effect    Unspecified adverse effect of anesthesia     sister-almost dies after surgeries. pt has never head of malignant hyperthermia and pt has never had difficulty during anesthesia     Social History     Tobacco Use    Smoking status: Never    Smokeless tobacco: Never   Substance Use Topics    Alcohol use: No    Drug use: No     Allergies   Allergen Reactions    Demerol [Meperidine] Nausea and Vomiting    Codeine Not Reported This Time    Penicillins Rash and Itching     Has tolerated zosyn     No family history on file. No current facility-administered medications for this encounter. PHYSICAL EXAM:  General: WD, WN.  Alert, cooperative, no acute distress    HEENT: NC, Atraumatic. PERRLA, EOMI. Anicteric sclerae. Lungs:  CTA Bilaterally. No Wheezing/Rhonchi/Rales. Heart:  Regular  rhythm,  No murmur, No Rubs, No Gallops  Abdomen: Soft, Non distended, Non tender. +Bowel sounds, no HSM  Extremities: No c/c/e  Neurologic:  CN 2-12 gi, Alert and oriented X 3. No acute neurological distress   Psych:   Good insight. Not anxious nor agitated. The heart, lungs and mental status were satisfactory for the administration of MAC sedation and for the procedure. Mallampati score: 2     The patient was counseled at length about the risks of jennifer Covid-19 in the jon-operative and post-operative states including the recovery window of their procedure. The patient was made aware that jennifer Covid-19 after a surgical procedure may worsen their prognosis for recovering from the virus and lend to a higher morbidity and or mortality risk. The patient was given the options of postponing their procedure. All of the risks, benefits, and alternatives were discussed. The patient does wish to proceed with the procedure.       Assessment:   History polyps     Plan:   Endoscopic procedure colonoscopy  MAC sedation

## 2023-02-10 NOTE — PROCEDURES
118 [facility].  [address] [facility] [address] Misael Singh, [address]  [phone]                              Colonoscopy Procedure Note      Indications:   Personal history colon polyps       :  Samina Griffin MD    Staff: Endoscopy Randy Rack: Madison Springer  Endoscopy RN-1: Elvin Jones RN    Referring Provider: Kalen Perdue NP    Sedation:  MAC anesthesia    Procedure Details:  After informed consent was obtained with all risks and benefits of procedure explained and preoperative exam completed, the patient was taken to the endoscopy suite and placed in the left lateral decubitus position. Upon sequential sedation as per above, a digital rectal exam was performed per below. The Olympus videocolonoscope was inserted in the rectum and carefully advanced to the terminal ileum. The quality of preparation was good. [place] Bowel Prep Score : 3/3/3. The colonoscope was slowly withdrawn with careful evaluation between folds. Retroflexion in the rectum was performed. Findings:   Rectum: small internal and external hemorrhoids   Sigmoid: normal  Descending Colon: normal  Transverse Colon: normal  Ascending Colon: normal  Cecum: normal  Terminal Ileum: normal    Interventions:  none    Specimen Removed:  * No specimens in log *    Complications: None. EBL:  none    Impression:    See Postoperative diagnosis above    Recommendations:   - Resume normal medications.  - Noting age, no further colonoscopies recommended solely for screening purposes. Discharge Disposition:  Home in the company of a  when able to ambulate.     Samina Griffin MD  2/10/2023  2:19 PM

## 2023-02-13 NOTE — ANESTHESIA POSTPROCEDURE EVALUATION
Post-Anesthesia Evaluation and Assessment    Patient: Champ Parker MRN: [id number]  SSN: [id number]    YOB: 1940  Age: 80 y.o. Sex: female      I have evaluated the patient and they are stable and ready for discharge from the PACU. Cardiovascular Function/Vital Signs  Visit Vitals  BP (!) 122/54   Pulse 74   Temp 36.6 °C (97.9 °F)   Resp 13   Ht 5' 7\" (1.702 m)   Wt 82.6 kg (182 lb)   SpO2 99%   Breastfeeding No   BMI 28.51 kg/m²       Patient is status post MAC anesthesia for Procedure(s):  COLONOSCOPY. Nausea/Vomiting: None    Postoperative hydration reviewed and adequate. Pain:  Pain Scale 1: Numeric (0 - 10) (02/10/23 1440)  Pain Intensity 1: 0 (02/10/23 1440)   Managed    Neurological Status: At baseline    Mental Status, Level of Consciousness: Alert and  oriented to person, place, and time    Pulmonary Status:   O2 Device: None (02/10/23 1440)   Adequate oxygenation and airway patent    Complications related to anesthesia: None    Post-anesthesia assessment completed. No concerns    Signed By: Adis Umaña DO     February 10, 2023                Procedure(s):  COLONOSCOPY.     MAC    <BSHSIANPOST>    INITIAL Post-op Vital signs:   Vitals Value Taken Time   /54 02/10/23 1440   Temp     Pulse 74 02/10/23 1440   Resp 13 02/10/23 1440   SpO2 99 % 02/10/23 1440

## 2023-04-03 ENCOUNTER — TRANSCRIBE ORDER (OUTPATIENT)
Dept: SCHEDULING | Age: 83
End: 2023-04-03

## 2023-04-03 DIAGNOSIS — I65.29 OCCLUSION AND STENOSIS OF UNSPECIFIED CAROTID ARTERY: Primary | ICD-10-CM

## 2023-06-22 ENCOUNTER — APPOINTMENT (OUTPATIENT)
Facility: HOSPITAL | Age: 83
End: 2023-06-22
Payer: MEDICARE

## 2023-06-22 ENCOUNTER — HOSPITAL ENCOUNTER (EMERGENCY)
Facility: HOSPITAL | Age: 83
Discharge: HOME | End: 2023-06-22
Attending: EMERGENCY MEDICINE
Payer: MEDICARE

## 2023-06-22 VITALS
DIASTOLIC BLOOD PRESSURE: 52 MMHG | HEART RATE: 91 BPM | HEIGHT: 67 IN | SYSTOLIC BLOOD PRESSURE: 120 MMHG | RESPIRATION RATE: 18 BRPM | OXYGEN SATURATION: 97 % | BODY MASS INDEX: 24.81 KG/M2 | WEIGHT: 158.07 LBS | TEMPERATURE: 98.3 F

## 2023-06-22 DIAGNOSIS — R11.2 NAUSEA AND VOMITING, UNSPECIFIED VOMITING TYPE: Primary | ICD-10-CM

## 2023-06-22 DIAGNOSIS — K22.4 ESOPHAGEAL DYSMOTILITY: ICD-10-CM

## 2023-06-22 LAB
ALBUMIN SERPL-MCNC: 3.4 G/DL (ref 3.5–5)
ALBUMIN/GLOB SERPL: 1 (ref 1.1–2.2)
ALP SERPL-CCNC: 83 U/L (ref 45–117)
ALT SERPL-CCNC: 33 U/L (ref 12–78)
ANION GAP SERPL CALC-SCNC: 7 MMOL/L (ref 5–15)
APPEARANCE UR: CLEAR
AST SERPL-CCNC: 25 U/L (ref 15–37)
BACTERIA URNS QL MICRO: NEGATIVE /HPF
BASOPHILS # BLD: 0 K/UL (ref 0–0.1)
BASOPHILS NFR BLD: 0 % (ref 0–1)
BILIRUB SERPL-MCNC: 0.8 MG/DL (ref 0.2–1)
BILIRUB UR QL: NEGATIVE
BUN SERPL-MCNC: 23 MG/DL (ref 6–20)
BUN/CREAT SERPL: 19 (ref 12–20)
CALCIUM SERPL-MCNC: 8.5 MG/DL (ref 8.5–10.1)
CHLORIDE SERPL-SCNC: 106 MMOL/L (ref 97–108)
CO2 SERPL-SCNC: 23 MMOL/L (ref 21–32)
COLOR UR: ABNORMAL
CREAT SERPL-MCNC: 1.22 MG/DL (ref 0.55–1.02)
DIFFERENTIAL METHOD BLD: ABNORMAL
EOSINOPHIL # BLD: 0 K/UL (ref 0–0.4)
EOSINOPHIL NFR BLD: 0 % (ref 0–7)
EPITH CASTS URNS QL MICRO: ABNORMAL /LPF
ERYTHROCYTE [DISTWIDTH] IN BLOOD BY AUTOMATED COUNT: 14.1 % (ref 11.5–14.5)
GLOBULIN SER CALC-MCNC: 3.3 G/DL (ref 2–4)
GLUCOSE SERPL-MCNC: 157 MG/DL (ref 65–100)
GLUCOSE UR STRIP.AUTO-MCNC: NEGATIVE MG/DL
HCT VFR BLD AUTO: 40 % (ref 35–47)
HGB BLD-MCNC: 13.2 G/DL (ref 11.5–16)
HGB UR QL STRIP: NEGATIVE
HYALINE CASTS URNS QL MICRO: ABNORMAL /LPF (ref 0–2)
IMM GRANULOCYTES # BLD AUTO: 0 K/UL (ref 0–0.04)
IMM GRANULOCYTES NFR BLD AUTO: 0 % (ref 0–0.5)
KETONES UR QL STRIP.AUTO: 40 MG/DL
LEUKOCYTE ESTERASE UR QL STRIP.AUTO: ABNORMAL
LIPASE SERPL-CCNC: 60 U/L (ref 73–393)
LYMPHOCYTES # BLD: 0.3 K/UL (ref 0.8–3.5)
LYMPHOCYTES NFR BLD: 3 % (ref 12–49)
MAGNESIUM SERPL-MCNC: 2.1 MG/DL (ref 1.6–2.4)
MCH RBC QN AUTO: 29.6 PG (ref 26–34)
MCHC RBC AUTO-ENTMCNC: 33 G/DL (ref 30–36.5)
MCV RBC AUTO: 89.7 FL (ref 80–99)
MONOCYTES # BLD: 0.5 K/UL (ref 0–1)
MONOCYTES NFR BLD: 4 % (ref 5–13)
NEUTS SEG # BLD: 10.8 K/UL (ref 1.8–8)
NEUTS SEG NFR BLD: 93 % (ref 32–75)
NITRITE UR QL STRIP.AUTO: NEGATIVE
NRBC # BLD: 0 K/UL (ref 0–0.01)
NRBC BLD-RTO: 0 PER 100 WBC
PH UR STRIP: 7.5 (ref 5–8)
PLATELET # BLD AUTO: 157 K/UL (ref 150–400)
PMV BLD AUTO: 11 FL (ref 8.9–12.9)
POTASSIUM SERPL-SCNC: 4.3 MMOL/L (ref 3.5–5.1)
PROT SERPL-MCNC: 6.7 G/DL (ref 6.4–8.2)
PROT UR STRIP-MCNC: ABNORMAL MG/DL
RBC # BLD AUTO: 4.46 M/UL (ref 3.8–5.2)
RBC #/AREA URNS HPF: ABNORMAL /HPF (ref 0–5)
RBC MORPH BLD: ABNORMAL
SODIUM SERPL-SCNC: 136 MMOL/L (ref 136–145)
SP GR UR REFRACTOMETRY: 1.02
TROPONIN I SERPL HS-MCNC: 9 NG/L (ref 0–51)
URINE CULTURE IF INDICATED: ABNORMAL
UROBILINOGEN UR QL STRIP.AUTO: 1 EU/DL (ref 0.2–1)
WBC # BLD AUTO: 11.6 K/UL (ref 3.6–11)
WBC URNS QL MICRO: ABNORMAL /HPF (ref 0–4)

## 2023-06-22 PROCEDURE — 80053 COMPREHEN METABOLIC PANEL: CPT

## 2023-06-22 PROCEDURE — 84484 ASSAY OF TROPONIN QUANT: CPT

## 2023-06-22 PROCEDURE — 2580000003 HC RX 258: Performed by: EMERGENCY MEDICINE

## 2023-06-22 PROCEDURE — 99285 EMERGENCY DEPT VISIT HI MDM: CPT

## 2023-06-22 PROCEDURE — 6360000002 HC RX W HCPCS: Performed by: EMERGENCY MEDICINE

## 2023-06-22 PROCEDURE — 96374 THER/PROPH/DIAG INJ IV PUSH: CPT

## 2023-06-22 PROCEDURE — 85025 COMPLETE CBC W/AUTO DIFF WBC: CPT

## 2023-06-22 PROCEDURE — 83690 ASSAY OF LIPASE: CPT

## 2023-06-22 PROCEDURE — 81001 URINALYSIS AUTO W/SCOPE: CPT

## 2023-06-22 PROCEDURE — 71045 X-RAY EXAM CHEST 1 VIEW: CPT

## 2023-06-22 PROCEDURE — 93005 ELECTROCARDIOGRAM TRACING: CPT | Performed by: EMERGENCY MEDICINE

## 2023-06-22 PROCEDURE — 36415 COLL VENOUS BLD VENIPUNCTURE: CPT

## 2023-06-22 PROCEDURE — 83735 ASSAY OF MAGNESIUM: CPT

## 2023-06-22 PROCEDURE — 96361 HYDRATE IV INFUSION ADD-ON: CPT

## 2023-06-22 PROCEDURE — 6370000000 HC RX 637 (ALT 250 FOR IP): Performed by: EMERGENCY MEDICINE

## 2023-06-22 RX ORDER — MAGNESIUM HYDROXIDE/ALUMINUM HYDROXICE/SIMETHICONE 120; 1200; 1200 MG/30ML; MG/30ML; MG/30ML
30 SUSPENSION ORAL EVERY 6 HOURS PRN
Status: DISCONTINUED | OUTPATIENT
Start: 2023-06-22 | End: 2023-06-22 | Stop reason: HOSPADM

## 2023-06-22 RX ORDER — ONDANSETRON 4 MG/1
4 TABLET, ORALLY DISINTEGRATING ORAL 3 TIMES DAILY PRN
Qty: 21 TABLET | Refills: 0 | Status: SHIPPED | OUTPATIENT
Start: 2023-06-22

## 2023-06-22 RX ORDER — LORAZEPAM 2 MG/ML
0.5 INJECTION INTRAMUSCULAR EVERY 6 HOURS PRN
Status: DISCONTINUED | OUTPATIENT
Start: 2023-06-22 | End: 2023-06-22 | Stop reason: HOSPADM

## 2023-06-22 RX ORDER — 0.9 % SODIUM CHLORIDE 0.9 %
500 INTRAVENOUS SOLUTION INTRAVENOUS ONCE
Status: COMPLETED | OUTPATIENT
Start: 2023-06-22 | End: 2023-06-22

## 2023-06-22 RX ADMIN — SODIUM CHLORIDE 500 ML: 9 INJECTION, SOLUTION INTRAVENOUS at 07:31

## 2023-06-22 RX ADMIN — LORAZEPAM 0.5 MG: 2 INJECTION INTRAMUSCULAR; INTRAVENOUS at 07:36

## 2023-06-22 RX ADMIN — ALUMINUM HYDROXIDE, MAGNESIUM HYDROXIDE, AND SIMETHICONE 30 ML: 200; 200; 20 SUSPENSION ORAL at 09:18

## 2023-06-22 ASSESSMENT — LIFESTYLE VARIABLES
HOW MANY STANDARD DRINKS CONTAINING ALCOHOL DO YOU HAVE ON A TYPICAL DAY: PATIENT DOES NOT DRINK
HOW OFTEN DO YOU HAVE A DRINK CONTAINING ALCOHOL: NEVER

## 2023-06-22 ASSESSMENT — PAIN DESCRIPTION - LOCATION: LOCATION: ABDOMEN

## 2023-06-22 ASSESSMENT — PAIN - FUNCTIONAL ASSESSMENT: PAIN_FUNCTIONAL_ASSESSMENT: ACTIVITIES ARE NOT PREVENTED

## 2023-06-22 ASSESSMENT — PAIN DESCRIPTION - DESCRIPTORS: DESCRIPTORS: DISCOMFORT

## 2023-06-22 ASSESSMENT — PAIN SCALES - GENERAL: PAINLEVEL_OUTOF10: 1

## 2023-06-22 NOTE — ED TRIAGE NOTES
Patient brought in via EMS from home with complaint of vomiting since 11pm. Patient reports she has a history of dysphagia and takes medication for it. EMS reports patient did not vomit while in route to ER. Patient reports she initially had some back pain but states this occurs to her when she lays down a lot.

## 2023-06-22 NOTE — ED PROVIDER NOTES
[facility] EMERGENCY DEPT  EMERGENCY DEPARTMENT ENCOUNTER       Pt Name: Bhargav Earl  MRN: [id number]  [place] 1940  Date of evaluation: 6/22/2023  Provider: Marilyn Quinteros DO   PCP: ANKIT Guillen NP  Note Started: 7:09 AM EDT 6/22/23     CHIEF COMPLAINT       Chief Complaint   Patient presents with    Emesis     Began around 11pm        HISTORY OF PRESENT ILLNESS: 1 or more elements      History From: Patient, History limited by: none     Bhargav Earl is a 80 y.o. female presents to the emergency department by EMS secondary to nausea and vomiting. She states she began having some vomiting episodes around 11:00 last night and has not been able to keep anything down. She states she has a history of esophageal dysmotility and these symptoms are very is consistent with that. She states she does have occasional episodes where she ends up having to come to the emergency department. She states she has had numerous work-ups and is currently taking Xanax for this. She denies any other fever or chills. She denies any cough or cold symptoms. She denies any chest pain or shortness of breath. She states she has had some nausea with vomiting there is been no diarrhea or constipation she denies any abdominal pain. She states she has had some mild suprapubic pressure and including some low back pain. She states that she does occasionally have this and is not related to any urinary issues however she states she does have a history of kidney disease. She denies any headache, loss of sensation or loss of strength. Please See MDM for Additional Details of the HPI/PMH  Nursing Notes were all reviewed and agreed with or any disagreements were addressed in the HPI. REVIEW OF SYSTEMS        Positives and Pertinent negatives as per HPI.     PAST HISTORY     Past Medical History:  Past Medical History:   Diagnosis Date    Anxiety     Arthritis     Cancer ([facility] [address]     BCC & SCC of face & leg    Chronic kidney disease

## 2023-06-23 LAB
EKG ATRIAL RATE: 93 BPM
EKG DIAGNOSIS: NORMAL
EKG P AXIS: 68 DEGREES
EKG P-R INTERVAL: 184 MS
EKG Q-T INTERVAL: 358 MS
EKG QRS DURATION: 82 MS
EKG QTC CALCULATION (BAZETT): 445 MS
EKG R AXIS: 36 DEGREES
EKG T AXIS: 65 DEGREES
EKG VENTRICULAR RATE: 93 BPM

## 2023-09-20 ENCOUNTER — HOSPITAL ENCOUNTER (OUTPATIENT)
Facility: HOSPITAL | Age: 83
Discharge: HOME | End: 2023-09-23
Attending: FAMILY MEDICINE
Payer: MEDICARE

## 2023-09-20 DIAGNOSIS — R13.10 DYSPHAGIA, UNSPECIFIED TYPE: ICD-10-CM

## 2023-09-20 DIAGNOSIS — K22.4 ESOPHAGEAL SPASM: ICD-10-CM

## 2023-09-20 PROCEDURE — 92611 MOTION FLUOROSCOPY/SWALLOW: CPT

## 2023-09-20 PROCEDURE — 74230 X-RAY XM SWLNG FUNCJ C+: CPT

## 2023-09-20 NOTE — PROGRESS NOTES
[facility]  Patient: Chanelle Osborne (67 y.o. female)  Date: 9/20/2023  Referring Provider: Ellie Shah MD    SUBJECTIVE:   Patient arrives today with complaints of a sore throat and post nasal drip. Patient reports she is schedule to see ENT next month. Note patient with history of esophageal dysphagia, including esophageal dysmotility, esophageal spasm, reflux, and hiatal hernia. EGD completed on 9/26/22 resulted in esophageal dilation. Patient reports she occasionally gets choked if she eats too quickly. MBS ordered to further assess oropharyngeal swallow function. OBJECTIVE:   Past Medical History:   Past Medical History:   Diagnosis Date    Anxiety     Arthritis     Cancer ([address])     BCC & SCC of face & leg    Chronic kidney disease     Stage 3 [facility]    Dysphagia 08/01/2014    pt denies any issues with dysphagia (food) at this time 2/15/17    GERD (gastroesophageal reflux disease)     IBS (irritable bowel syndrome)     Nausea & vomiting     Neuropathy      idiopathic axonal neuropathy Dr Ana Ferrer in [place]     Pneumonia age 8    PUD (peptic ulcer disease)     SBO (small bowel obstruction) ([address]) 4/28/2019    Stroke ([address]) 2007 2 \"mini-dia\" -as of 2/1/5/17 pt denies any residual effect    Unspecified adverse effect of anesthesia     sister-almost dies after surgeries.  pt has never head of malignant hyperthermia and pt has never had difficulty during anesthesia     Past Surgical History:   Procedure Laterality Date    BREAST BIOPSY Bilateral     benign    CHOLECYSTECTOMY, LAPAROSCOPIC      COLONOSCOPY N/A 6/9/2016    COLONOSCOPY performed by Savanna Neal MD at [facility] ENDOSCOPY    COLONOSCOPY N/A 2/10/2023    COLONOSCOPY performed by Julius Kelsey MD at [facility] ENDOSCOPY    COLONOSCOPY FLX DX W/COLLJ SPEC WHEN PFRMD  2/21/2011         HYSTERECTOMY (CERVIX STATUS UNKNOWN)      SALPINGO-OOPHORECTOMY      \"left

## 2023-11-15 ENCOUNTER — HOSPITAL ENCOUNTER (OUTPATIENT)
Facility: HOSPITAL | Age: 83
Discharge: HOME | End: 2023-11-18
Attending: OTOLARYNGOLOGY
Payer: MEDICARE

## 2023-11-15 DIAGNOSIS — R09.89 CHRONIC THROAT CLEARING: ICD-10-CM

## 2023-11-15 DIAGNOSIS — Z78.9 NON-SMOKER: ICD-10-CM

## 2023-11-15 DIAGNOSIS — J32.9 CHRONIC SINUSITIS, UNSPECIFIED LOCATION: ICD-10-CM

## 2023-11-15 DIAGNOSIS — Z00.8 ENCOUNTER FOR SPECIALIZED MEDICAL EXAMINATION: ICD-10-CM

## 2023-11-15 DIAGNOSIS — R09.A2 GLOBUS PHARYNGEUS: ICD-10-CM

## 2023-11-15 DIAGNOSIS — J30.9 ALLERGIC RHINITIS, UNSPECIFIED SEASONALITY, UNSPECIFIED TRIGGER: ICD-10-CM

## 2023-11-15 DIAGNOSIS — R09.89 THROAT CLEARING: ICD-10-CM

## 2023-11-15 DIAGNOSIS — K21.9 BILE ACID ESOPHAGEAL REFLUX: ICD-10-CM

## 2023-11-15 PROCEDURE — 70486 CT MAXILLOFACIAL W/O DYE: CPT

## 2024-02-28 ENCOUNTER — TRANSCRIBE ORDERS (OUTPATIENT)
Facility: HOSPITAL | Age: 84
End: 2024-02-28

## 2024-02-28 ENCOUNTER — HOSPITAL ENCOUNTER (OUTPATIENT)
Facility: HOSPITAL | Age: 84
Discharge: HOME | End: 2024-03-02
Payer: MEDICARE

## 2024-02-28 DIAGNOSIS — M13.0 POLYARTHROPATHY: Primary | ICD-10-CM

## 2024-02-28 DIAGNOSIS — M13.0 POLYARTHROPATHY: ICD-10-CM

## 2024-02-28 PROCEDURE — 73562 X-RAY EXAM OF KNEE 3: CPT

## 2024-02-28 PROCEDURE — 73030 X-RAY EXAM OF SHOULDER: CPT

## 2024-02-28 PROCEDURE — 73130 X-RAY EXAM OF HAND: CPT

## 2025-05-18 ENCOUNTER — TRANSCRIBE ORDERS (OUTPATIENT)
Facility: HOSPITAL | Age: 85
End: 2025-05-18

## 2025-05-18 DIAGNOSIS — K21.9 GASTROESOPHAGEAL REFLUX DISEASE, UNSPECIFIED WHETHER ESOPHAGITIS PRESENT: ICD-10-CM

## 2025-05-18 DIAGNOSIS — Z78.9 NON-SMOKER: ICD-10-CM

## 2025-05-18 DIAGNOSIS — J34.2 NASAL SEPTAL DEVIATION: ICD-10-CM

## 2025-05-18 DIAGNOSIS — R09.81 NASAL CONGESTION: ICD-10-CM

## 2025-05-18 DIAGNOSIS — H61.20 IMPACTED CERUMEN, UNSPECIFIED LATERALITY: ICD-10-CM

## 2025-05-18 DIAGNOSIS — R13.10 DYSPHAGIA, UNSPECIFIED TYPE: Primary | ICD-10-CM

## 2025-05-18 DIAGNOSIS — J34.3 HYPERTROPHY OF BOTH INFERIOR NASAL TURBINATES: ICD-10-CM

## 2025-06-10 ENCOUNTER — HOSPITAL ENCOUNTER (OUTPATIENT)
Facility: HOSPITAL | Age: 85
Discharge: HOME | End: 2025-06-13
Attending: OTOLARYNGOLOGY
Payer: MEDICARE

## 2025-06-10 DIAGNOSIS — R13.10 DYSPHAGIA, UNSPECIFIED TYPE: ICD-10-CM

## 2025-06-10 DIAGNOSIS — Z78.9 NON-SMOKER: ICD-10-CM

## 2025-06-10 DIAGNOSIS — R09.81 NASAL CONGESTION: ICD-10-CM

## 2025-06-10 DIAGNOSIS — K21.9 GASTROESOPHAGEAL REFLUX DISEASE, UNSPECIFIED WHETHER ESOPHAGITIS PRESENT: ICD-10-CM

## 2025-06-10 DIAGNOSIS — J34.2 NASAL SEPTAL DEVIATION: ICD-10-CM

## 2025-06-10 DIAGNOSIS — H61.20 IMPACTED CERUMEN, UNSPECIFIED LATERALITY: ICD-10-CM

## 2025-06-10 DIAGNOSIS — J34.3 HYPERTROPHY OF BOTH INFERIOR NASAL TURBINATES: ICD-10-CM

## 2025-06-10 PROCEDURE — 70486 CT MAXILLOFACIAL W/O DYE: CPT

## (undated) DEVICE — INFECTION CONTROL KIT SYS

## (undated) DEVICE — BASIN EMSIS 16OZ GRAPHITE PLAS KID SHP MOLD GRAD FOR ORAL

## (undated) DEVICE — Device

## (undated) DEVICE — NEEDLE HYPO 18GA L1.5IN PNK S STL HUB POLYPR SHLD REG BVL

## (undated) DEVICE — SURGICAL PROCEDURE PACK CATRCT CUST

## (undated) DEVICE — CONTAINER SPEC 20 ML LID NEUT BUFF FORMALIN 10 % POLYPR STS

## (undated) DEVICE — Z DISCONTINUED PER MEDLINE LINE GAS SAMPLING O2/CO2 LNG AD 13 FT NSL W/ TBNG FILTERLINE

## (undated) DEVICE — LIGHT HANDLE: Brand: DEVON

## (undated) DEVICE — SET ADMIN 16ML TBNG L100IN 2 Y INJ SITE IV PIGGY BK DISP

## (undated) DEVICE — NEEDLE HYPO 22GA L1.5IN BLK S STL HUB POLYPR SHLD REG BVL

## (undated) DEVICE — NEONATAL-ADULT SPO2 SENSOR: Brand: NELLCOR

## (undated) DEVICE — BLADELESS OPTICAL TROCAR WITH FIXATION CANNULA: Brand: VERSAPORT

## (undated) DEVICE — SUTURE MCRYL SZ 4-0 L27IN ABSRB UD L19MM PS-2 1/2 CIR PRIM Y426H

## (undated) DEVICE — TUBING HYDR IRR --

## (undated) DEVICE — SURGICAL PROCEDURE KIT GEN LAPAROSCOPY LF

## (undated) DEVICE — DEVON™ KNEE AND BODY STRAP 60" X 3" (1.5 M X 7.6 CM): Brand: DEVON

## (undated) DEVICE — SYR 10ML LUER LOK 1/5ML GRAD --

## (undated) DEVICE — UNIVERSAL FIXATION CANNULA: Brand: VERSAONE

## (undated) DEVICE — SYRINGE 50ML E/T

## (undated) DEVICE — SUTURE VCRL SZ 10-0 L12IN ABSRB VLT L5.5MM CS160-6 1/2 CIR V448G

## (undated) DEVICE — (D)PREP SKN CHLRAPRP APPL 26ML -- CONVERT TO ITEM 371833

## (undated) DEVICE — SYR ASSEMB INFL BLLN 60ML --

## (undated) DEVICE — ELECTRODE,RADIOTRANSLUCENT,FOAM,5PK: Brand: MEDLINE

## (undated) DEVICE — 1200 GUARD II KIT W/5MM TUBE W/O VAC TUBE: Brand: GUARDIAN

## (undated) DEVICE — YANKAUER,TAPERED BULBOUS TIP,W/O VENT: Brand: MEDLINE

## (undated) DEVICE — HYPODERMIC SAFETY NEEDLE: Brand: MAGELLAN

## (undated) DEVICE — BAG SPEC BIOHZRD 10 X 10 IN --

## (undated) DEVICE — CUFF ADULT 1 PC 1 VINYL DISP --

## (undated) DEVICE — MEDI-VAC YANK SUCT HNDL W/TPRD BULBOUS TIP: Brand: CARDINAL HEALTH

## (undated) DEVICE — APPLICATOR BNDG 1MM ADH PREMIERPRO EXOFIN

## (undated) DEVICE — BLOCK BITE ENDOSCP AD 21 MM W/ DIL BLU LF DISP

## (undated) DEVICE — SOLIDIFIER MEDC 1200ML -- CONVERT TO 356117

## (undated) DEVICE — SYR 3ML LL TIP 1/10ML GRAD --

## (undated) DEVICE — FORCEPS BX L160CM DIA8MM GRSP DISECT CUP TIP NONLOCKING ROT

## (undated) DEVICE — ESOPHAGEAL BALLOON DILATATION CATHETER: Brand: CRE FIXED WIRE

## (undated) DEVICE — 3000CC GUARDIAN II: Brand: GUARDIAN

## (undated) DEVICE — TOWEL 4 PLY TISS 19X30 SUE WHT

## (undated) DEVICE — CATH IV AUTOGRD BC PNK 20GA 25 -- INSYTE

## (undated) DEVICE — GLOVE SURG SZ 75 CRM LTX FREE POLYISOPRENE POLYMER BEAD ANTI

## (undated) DEVICE — E-Z CLEAN, PTFE COATED, ELECTROSURGICAL LAPAROSCOPIC ELECTRODE, L-HOOK, 33 CM., SINGLE-USE, FOR USE WITH HAND CONTROL PENCIL: Brand: MEGADYNE

## (undated) DEVICE — KENDALL DL ECG CABLE AND LEAD WIRE SYSTEM, 3-LEAD, SINGLE PATIENT USE: Brand: KENDALL

## (undated) DEVICE — THE MONARCH® "D" CARTRIDGE IS A SINGLE-USE POLYPROPYLENE CARTRIDGE FOR POSTERIOR CHAMBER IOL DELIVERY: Brand: MONARCH® III

## (undated) DEVICE — SURGICAL PROCEDURE PACK EYE CUST DR CHNDLR

## (undated) DEVICE — SOLIDIFIER FLD 2OZ 1500CC N DISINF IN BTL DISP SAFESORB

## (undated) DEVICE — STERILE POLYISOPRENE POWDER-FREE SURGICAL GLOVES: Brand: PROTEXIS

## (undated) DEVICE — KENDALL RADIOLUCENT FOAM MONITORING ELECTRODE RECTANGULAR SHAPE: Brand: KENDALL

## (undated) DEVICE — TUBING INSUFLTN 10FT LUER -- CONVERT TO ITEM 368568

## (undated) DEVICE — KENDALL SCD EXPRESS SLEEVES, KNEE LENGTH, MEDIUM: Brand: KENDALL SCD

## (undated) DEVICE — TRAY CATH OD16FR SIL URIN M STATLOK STBL DEV SURSTP

## (undated) DEVICE — CUFF BLD PRSS AD 25-34CM 1 TB W/ M BAYNT CONN REUSE

## (undated) DEVICE — REM POLYHESIVE ADULT PATIENT RETURN ELECTRODE: Brand: VALLEYLAB

## (undated) DEVICE — SOL IRRIGATION INJ NACL 0.9% 500ML BTL

## (undated) DEVICE — VISUALIZATION SYSTEM: Brand: CLEARIFY

## (undated) DEVICE — DEVICE TRNSF SPIK STL 2008S] MICROTEK MEDICAL INC]

## (undated) DEVICE — TOWEL SURG W17XL27IN STD BLU COT NONFENESTRATED PREWASHED

## (undated) DEVICE — SUTURE SZ 0 27IN 5/8 CIR UR-6  TAPER PT VIOLET ABSRB VICRYL J603H

## (undated) DEVICE — SOLUTION IRRIG 500ML STRL H2O NONPYROGENIC